# Patient Record
Sex: FEMALE | Race: WHITE | NOT HISPANIC OR LATINO | Employment: OTHER | URBAN - METROPOLITAN AREA
[De-identification: names, ages, dates, MRNs, and addresses within clinical notes are randomized per-mention and may not be internally consistent; named-entity substitution may affect disease eponyms.]

---

## 2017-04-03 ENCOUNTER — HOSPITAL ENCOUNTER (OUTPATIENT)
Dept: MAMMOGRAPHY | Facility: CLINIC | Age: 67
Discharge: HOME | End: 2017-04-03
Attending: FAMILY MEDICINE | Admitting: FAMILY MEDICINE
Payer: MEDICARE

## 2017-04-03 DIAGNOSIS — Z12.31 VISIT FOR SCREENING MAMMOGRAM: ICD-10-CM

## 2017-04-03 PROCEDURE — 77063 BREAST TOMOSYNTHESIS BI: CPT

## 2017-04-03 PROCEDURE — G0202 SCR MAMMO BI INCL CAD: HCPCS

## 2017-04-03 NOTE — PROGRESS NOTES
Dear Francisca,    Your recent mammogram was normal. Annual mammograms are recommended, so you will be due again in April 2018.  You may receive a separate result letter in the mail from the imaging center.    Please contact the clinic if you have additional questions.  Thank you.    Sincerely,    Ju Mckeon PA-C  Physician extender for Dr. Karen Weiler

## 2017-05-01 ENCOUNTER — TELEPHONE (OUTPATIENT)
Dept: FAMILY MEDICINE | Facility: CLINIC | Age: 67
End: 2017-05-01

## 2017-05-01 DIAGNOSIS — R19.7 DIARRHEA, UNSPECIFIED TYPE: Primary | ICD-10-CM

## 2017-05-01 NOTE — TELEPHONE ENCOUNTER
Reason for call:  Patient reporting a symptom    Symptom or request: diarrhea    Duration (how long have symptoms been present): has been going on for about 8-9 months, but is now worse    Have you been treated for this before? Yes    Additional comments: Pt was seen for this and was told it may be IBS, but now she feels this is different.  For the past few days she has had very watery diarrhea and has had some accidents.  Did vomit one time early Friday morning, but nothing since then.  Just the constant diarrhea.  She was offered an appt, but she does not feel she can make it in due to the severity of her sxs.  Asking for a call back to triage.    Phone Number patient can be reached at:  Home number on file [phone] (home)    Best Time:      Can we leave a detailed message on this number:  YES    Call taken on 5/1/2017 at 8:09 AM by Ivania Krishna

## 2017-05-01 NOTE — TELEPHONE ENCOUNTER
Please call patient and let her know we need to see her. I would also like to collect stool studies. I placed the orders and she can come in or have someone come in for the containers.     Karen Weiler, MD

## 2017-05-01 NOTE — TELEPHONE ENCOUNTER
Please see telephone encounter below. I spoke with patient:    Diarrhea     Onset: off and on for 9 months, worsening over the last few days    Description:   Consistency of stool: watery and yellow  Blood in stool: no   Number of loose stools in past 24 hours: 6    Progression of Symptoms:  same and intermittent    Accompanying Signs & Symptoms:  Fever: no, however has felt chills  Nausea or vomiting; YES- vomited early Friday morning - nothing since that time  Abdominal pain: YES- every once in a while will have some cramping  Episodes of constipation: no   Weight loss: no    History:   Ill contacts: no   Recent use of antibiotics: no    Recent travels: no          Recent medication-new or changes(Rx or OTC): no     Precipitating factors:   See below    Alleviating factors:   None       Therapies Tried and outcome:  Imodium AD and Culturelle; Outcome: just started it today, but has still been having diarrhea    She has not had much of an appetite but is still trying to eat. She has been pushing fluids - however, eating and drinking tend to trigger her diarrhea episodes.     Advised patient that she may continue with Imodium given that she has only been on it for one day. Stick with a bland diet and continue to push fluids.    Please advise if you would like to see patient and/or stool studies. Thank you.  Radha Uriarte, RN, BSN  [facility] [place]

## 2017-05-01 NOTE — TELEPHONE ENCOUNTER
Spoke with patient. Appointment scheduled with TERRANCE JIMENEZ for tomorrow (5/2/17) at 1:40pm.    Patient verbalized understanding and agrees with plan.    Will call back if further questions or concerns.    Radha Uriarte RN, BSN

## 2017-05-02 ENCOUNTER — OFFICE VISIT (OUTPATIENT)
Dept: FAMILY MEDICINE | Facility: CLINIC | Age: 67
End: 2017-05-02
Payer: COMMERCIAL

## 2017-05-02 VITALS
DIASTOLIC BLOOD PRESSURE: 78 MMHG | HEART RATE: 119 BPM | OXYGEN SATURATION: 96 % | TEMPERATURE: 98.3 F | SYSTOLIC BLOOD PRESSURE: 122 MMHG | HEIGHT: 69 IN | WEIGHT: 232 LBS | BODY MASS INDEX: 34.36 KG/M2

## 2017-05-02 DIAGNOSIS — F43.0 ACUTE REACTION TO STRESS: ICD-10-CM

## 2017-05-02 DIAGNOSIS — R19.7 DIARRHEA, UNSPECIFIED TYPE: Primary | ICD-10-CM

## 2017-05-02 DIAGNOSIS — F50.89 PICA: ICD-10-CM

## 2017-05-02 LAB
BASOPHILS # BLD AUTO: 0 10E9/L (ref 0–0.2)
BASOPHILS NFR BLD AUTO: 0.8 %
DIFFERENTIAL METHOD BLD: NORMAL
EOSINOPHIL # BLD AUTO: 0.1 10E9/L (ref 0–0.7)
EOSINOPHIL NFR BLD AUTO: 2 %
ERYTHROCYTE [DISTWIDTH] IN BLOOD BY AUTOMATED COUNT: 12.6 % (ref 10–15)
HCT VFR BLD AUTO: 46.1 % (ref 35–47)
HGB BLD-MCNC: 15.6 G/DL (ref 11.7–15.7)
LYMPHOCYTES # BLD AUTO: 1.5 10E9/L (ref 0.8–5.3)
LYMPHOCYTES NFR BLD AUTO: 29.5 %
MCH RBC QN AUTO: 31.8 PG (ref 26.5–33)
MCHC RBC AUTO-ENTMCNC: 33.8 G/DL (ref 31.5–36.5)
MCV RBC AUTO: 94 FL (ref 78–100)
MONOCYTES # BLD AUTO: 0.6 10E9/L (ref 0–1.3)
MONOCYTES NFR BLD AUTO: 12.3 %
NEUTROPHILS # BLD AUTO: 2.8 10E9/L (ref 1.6–8.3)
NEUTROPHILS NFR BLD AUTO: 55.4 %
PLATELET # BLD AUTO: 196 10E9/L (ref 150–450)
RBC # BLD AUTO: 4.91 10E12/L (ref 3.8–5.2)
WBC # BLD AUTO: 5.1 10E9/L (ref 4–11)

## 2017-05-02 PROCEDURE — 80053 COMPREHEN METABOLIC PANEL: CPT | Performed by: PHYSICIAN ASSISTANT

## 2017-05-02 PROCEDURE — 99213 OFFICE O/P EST LOW 20 MIN: CPT | Performed by: PHYSICIAN ASSISTANT

## 2017-05-02 PROCEDURE — 85025 COMPLETE CBC W/AUTO DIFF WBC: CPT | Performed by: PHYSICIAN ASSISTANT

## 2017-05-02 PROCEDURE — 36415 COLL VENOUS BLD VENIPUNCTURE: CPT | Performed by: PHYSICIAN ASSISTANT

## 2017-05-02 ASSESSMENT — ANXIETY QUESTIONNAIRES
2. NOT BEING ABLE TO STOP OR CONTROL WORRYING: NOT AT ALL
5. BEING SO RESTLESS THAT IT IS HARD TO SIT STILL: NOT AT ALL
1. FEELING NERVOUS, ANXIOUS, OR ON EDGE: SEVERAL DAYS
IF YOU CHECKED OFF ANY PROBLEMS ON THIS QUESTIONNAIRE, HOW DIFFICULT HAVE THESE PROBLEMS MADE IT FOR YOU TO DO YOUR WORK, TAKE CARE OF THINGS AT HOME, OR GET ALONG WITH OTHER PEOPLE: NOT DIFFICULT AT ALL
GAD7 TOTAL SCORE: 2
3. WORRYING TOO MUCH ABOUT DIFFERENT THINGS: SEVERAL DAYS
7. FEELING AFRAID AS IF SOMETHING AWFUL MIGHT HAPPEN: NOT AT ALL
6. BECOMING EASILY ANNOYED OR IRRITABLE: NOT AT ALL

## 2017-05-02 ASSESSMENT — PATIENT HEALTH QUESTIONNAIRE - PHQ9: 5. POOR APPETITE OR OVEREATING: NOT AT ALL

## 2017-05-02 NOTE — PROGRESS NOTES
SUBJECTIVE:                                                    Francisca Ramírez is a 66 year old female who presents to clinic today for the following health issues:    Diarrhea     Onset: off and on for 9 months, worsening over the last few days    Description:   Consistency of stool: watery and yellow  Blood in stool: no   Number of loose stools in past 24 hours: 6    Progression of Symptoms: same and intermittent    Accompanying Signs & Symptoms:  Fever: no, however has felt chills  Nausea or vomiting; YES- vomited early Friday morning - nothing since that time  Abdominal pain: YES- every once in a while will have some cramping  Episodes of constipation: no   Weight loss: no   Fatigue:  Very much so    History:   Ill contacts: no   Recent use of antibiotics: no    Recent travels: no    Recent medication-new or changes(Rx or OTC): no     Precipitating factors:   See below    Alleviating factors:   None     Therapies Tried and outcome:  Imodium AD and Culturelle; Outcome: just started it today, but has still been having diarrhea. Has now stopped Culturelle    She has not had much of an appetite but is still trying to eat. She has been pushing fluids - however, eating and drinking tend to trigger her diarrhea episodes.    Patient is craving ice and cold water     Nausea on Friday AM, then resolved.  Had some vomiting and diarrhea. Vomited once.  Dairy has caused GI symptoms for her in the past.  Granddaughter sick recently, but unsure if she had GI symptoms. Knows she had a cough.    Reports slight sore throat today.    No internat'l travel.    Chills, but no fevers    No abdominal pain.  Hearing some stomach sounds.    Has had fecal incontinence.    Normal colonoscopy in 2014, other than internal hemorrhoids. Due for screening colonoscopy in 2019.    Had negative celiac testing in Nov 2016.    Some abnormal sensations in the back of her thighs, but wonders if this is from being inactive recently. Denies back  "pain or other body pains.  No urinary symptoms. No shortness of breath.    Problem list and histories reviewed & adjusted, as indicated.  Additional history: as documented    Patient Active Problem List   Diagnosis     Acute reaction to stress     HYPERLIPIDEMIA LDL GOAL <130     Advanced directives, counseling/discussion     Impaired fasting glucose     Knee pain     Family history of colon cancer     Past Surgical History:   Procedure Laterality Date     cataracts       COLONOSCOPY N/A 2014    Procedure: COLONOSCOPY;  Surgeon: Todd Borrego MD;  Location:  GI     EYE SURGERY      Cataract surgery on both eyes     GYN SURGERY  ?    d/c     SURGICAL HISTORY OF -       s/p D&C 25 years ago     SURGICAL HISTORY OF -   2004    Colonoscopy       Social History   Substance Use Topics     Smoking status: Never Smoker     Smokeless tobacco: Never Used     Alcohol use Yes      Comment: two or more on occassion     Family History   Problem Relation Age of Onset     Cancer - colorectal Mother      82 yo      Colon Cancer Mother           CANCER Father      Brain tumor, age 46         Current Outpatient Prescriptions   Medication Sig Dispense Refill     simvastatin (ZOCOR) 40 MG tablet Take 1 tablet (40 mg) by mouth daily 90 tablet 3     venlafaxine (EFFEXOR-XR) 75 MG 24 hr capsule Take 1 capsule (75 mg) by mouth daily 90 capsule 3     cholecalciferol (VITAMIN-D) 1000 UNIT capsule Take 1 capsule by mouth daily.       UNKNOWN MED DOSAGE Flexitol heel balm: apply to feet daily as needed 1 1     Allergies   Allergen Reactions     Vicodin [Hydrocodone-Acetaminophen]        ROS:  Constitutional, HEENT, cardiovascular, pulmonary, gi and gu systems are negative, except as otherwise noted.    OBJECTIVE:                                                    /78 (BP Location: Right arm, Patient Position: Chair, Cuff Size: Adult Large)  Pulse 119  Temp 98.3  F (36.8  C) (Oral)  Ht 5' 9\" (1.753 m) "  Wt 232 lb (105.2 kg)  SpO2 96%  BMI 34.26 kg/m2  Body mass index is 34.26 kg/(m^2).  GENERAL: healthy, alert and no distress  RESP: lungs clear to auscultation - no rales, rhonchi or wheezes  CV: regular rate and rhythm, normal S1 S2, no S3 or S4, no murmur, click or rub, no peripheral edema and peripheral pulses strong  ABDOMEN: bowel sounds normal and mild suprapubic tenderness  MS: Trace pedal edema  PSYCH: mentation appears normal, affect normal/bright    Diagnostic Test Results:  No results found for this or any previous visit (from the past 24 hour(s)).     ASSESSMENT/PLAN:                                                      1. Diarrhea, unspecified type  Will check stool studies. Colitis and IBS are also on differential. Continue with Imodium. Keep diet bland. Push fluids.     2. Pica  Check labs today. Normal hemoglobin at last physical. Symptoms may be related to dehydration.  - CBC with platelets and differential  - Comprehensive metabolic panel (BMP + Alb, Alk Phos, ALT, AST, Total. Bili, TP)  - JUST IN CASE    3. Acute reaction to stress  Recommend restarting Effexor. Stopped it for the past few days. Last dose increase was in Nov 2015, so low suspicion of these symptoms being due to Effexor.    See Patient Instructions    Ju Mckeon PA-C  [facility]

## 2017-05-02 NOTE — NURSING NOTE
"Chief Complaint   Patient presents with     Diarrhea     on and off x 9 months but worsening last 2-3 days      Initial /78 (BP Location: Right arm, Patient Position: Chair, Cuff Size: Adult Large)  Pulse 119  Temp 98.3  F (36.8  C) (Oral)  Ht 5' 9\" (1.753 m)  Wt 232 lb (105.2 kg)  SpO2 96%  BMI 34.26 kg/m2 Estimated body mass index is 34.26 kg/(m^2) as calculated from the following:    Height as of this encounter: 5' 9\" (1.753 m).    Weight as of this encounter: 232 lb (105.2 kg).  BP completed using cuff size large right Arm  Naomi Lorasruthi CMA    "

## 2017-05-02 NOTE — PATIENT INSTRUCTIONS
-Continue on Imodium as needed  -Continue with water, and consider adding some Pedialyte to replace electrolytes  -Keep diet bland

## 2017-05-02 NOTE — MR AVS SNAPSHOT
After Visit Summary   5/2/2017    Francisca Ramírez    MRN: [id number]           Patient Information     Date Of Birth          1950        Visit Information        Provider Department      5/2/2017 1:40 PM Ju Mckeon PA-C [facility] [place]        Today's Diagnoses     Diarrhea, unspecified type    -  1    Pica          Care Instructions    -Continue on Imodium as needed  -Continue with water, and consider adding some Pedialyte to replace electrolytes  -Keep diet bland        Follow-ups after your visit        Future tests that were ordered for you today     Open Future Orders        Priority Expected Expires Ordered    Enteric Bacteria and Virus Panel by [facility] Stool Routine  5/1/2018 5/1/2017    Clostridium difficile Toxin B PCR Routine  11/1/2017 5/1/2017            Who to contact     If you have questions or need follow up information about today's clinic visit or your schedule please contact [facility] directly at [phone].  Normal or non-critical lab and imaging results will be communicated to you by [employer], letter or phone within 4 business days after the clinic has received the results. If you do not hear from us within 7 days, please contact the clinic through [employer] or phone. If you have a critical or abnormal lab result, we will notify you by phone as soon as possible.  Submit refill requests through [employer] or call your pharmacy and they will forward the refill request to us. Please allow 3 business days for your refill to be completed.          Additional Information About Your Visit        MyChart Information     [employer] gives you secure access to your electronic health record. If you see a primary care provider, you can also send messages to your care team and make appointments. If you have questions, please call your primary care clinic.  If you do not have a primary care provider, please call [phone] and they will assist you.        Care  "EveryWhere ID     This is your Care EveryWhere ID. This could be used by other organizations to access your [facility] medical records  [id number]        Your Vitals Were     Pulse Temperature Height Pulse Oximetry BMI (Body Mass Index)       119 98.3  F (36.8  C) (Oral) 5' 9\" (1.753 m) 96% 34.26 kg/m2        Blood Pressure from Last 3 Encounters:   05/02/17 122/78   11/21/16 122/78   11/27/15 112/70    Weight from Last 3 Encounters:   05/02/17 232 lb (105.2 kg)   11/21/16 240 lb (108.9 kg)   11/27/15 246 lb (111.6 kg)              We Performed the Following     CBC with platelets and differential     Comprehensive metabolic panel (BMP + Alb, Alk Phos, ALT, AST, Total. Bili, TP)     JUST IN CASE        Primary Care Provider Office Phone # Fax #    Karen Weiler, [phone]       [facility] [address] [address]        Thank you!     Thank you for choosing [facility]  for your care. Our goal is always to provide you with excellent care. Hearing back from our patients is one way we can continue to improve our services. Please take a few minutes to complete the written survey that you may receive in the mail after your visit with us. Thank you!             Your Updated Medication List - Protect others around you: Learn how to safely use, store and throw away your medicines at www.disposemymeds.org.          This list is accurate as of: 5/2/17  2:01 PM.  Always use your most recent med list.                   Brand Name Dispense Instructions for use    cholecalciferol 1000 UNITS capsule    vitamin  -D     Take 1 capsule by mouth daily.       simvastatin 40 MG tablet    ZOCOR    90 tablet    Take 1 tablet (40 mg) by mouth daily       UNKNOWN MED DOSAGE     1    Flexitol heel balm: apply to feet daily as needed       venlafaxine 75 MG 24 hr capsule    EFFEXOR-XR    90 capsule    Take 1 capsule (75 mg) by mouth daily         "

## 2017-05-03 LAB
ALBUMIN SERPL-MCNC: 3.8 G/DL (ref 3.4–5)
ALP SERPL-CCNC: 77 U/L (ref 40–150)
ALT SERPL W P-5'-P-CCNC: 44 U/L (ref 0–50)
ANION GAP SERPL CALCULATED.3IONS-SCNC: 9 MMOL/L (ref 3–14)
AST SERPL W P-5'-P-CCNC: 38 U/L (ref 0–45)
BILIRUB SERPL-MCNC: 0.6 MG/DL (ref 0.2–1.3)
BUN SERPL-MCNC: 12 MG/DL (ref 7–30)
CALCIUM SERPL-MCNC: 8.8 MG/DL (ref 8.5–10.1)
CHLORIDE SERPL-SCNC: 109 MMOL/L (ref 94–109)
CO2 SERPL-SCNC: 26 MMOL/L (ref 20–32)
CREAT SERPL-MCNC: 0.73 MG/DL (ref 0.52–1.04)
GFR SERPL CREATININE-BSD FRML MDRD: 80 ML/MIN/1.7M2
GLUCOSE SERPL-MCNC: 96 MG/DL (ref 70–99)
POTASSIUM SERPL-SCNC: 3.7 MMOL/L (ref 3.4–5.3)
PROT SERPL-MCNC: 7.6 G/DL (ref 6.8–8.8)
SODIUM SERPL-SCNC: 144 MMOL/L (ref 133–144)

## 2017-05-03 ASSESSMENT — ANXIETY QUESTIONNAIRES: GAD7 TOTAL SCORE: 2

## 2017-05-03 ASSESSMENT — PATIENT HEALTH QUESTIONNAIRE - PHQ9: SUM OF ALL RESPONSES TO PHQ QUESTIONS 1-9: 3

## 2017-05-04 ENCOUNTER — TELEPHONE (OUTPATIENT)
Dept: FAMILY MEDICINE | Facility: CLINIC | Age: 67
End: 2017-05-04

## 2017-05-04 NOTE — TELEPHONE ENCOUNTER
Message handled by Nurse Triage with [employer] - provider name: Ju Mckeon PA-C. Blood work was normal.     Patient reports that she has now stopped having diarrhea and is wondering about still leaving stool samples. She stopped Imodium yesterday when symptoms had resolved. She is feeling much more like herself and has been able to tolerate more regular foods without issue.    I advised patient that we can hold off on collecting stool samples for now. If symptoms should return, she may go ahead and collect them.    Patient verbalized understanding and agrees with plan.    Will call back if further questions or concerns.    Radha Uriarte, RN, BSN

## 2017-05-08 NOTE — PROGRESS NOTES
Henry Espinosa,    -Liver and gallbladder tests are normal. (ALT,AST, Alk phos, bilirubin), kidney function is normal (Cr, GFR), Sodium is normal, Potassium is normal, Calcium is normal, Glucose is normal (diabetes screening test).   -Normal red blood cell (hgb) levels, normal white blood cell count and normal platelet levels.    If you have further questions about the interpretation of your labs, labtestsonline.org is a good website to check out for further information.    Please contact the clinic if you have additional questions.  Thank you.    Sincerely,    Ju Mckeon PA-C

## 2017-05-17 ENCOUNTER — OFFICE VISIT (OUTPATIENT)
Dept: FAMILY MEDICINE | Facility: CLINIC | Age: 67
End: 2017-05-17
Payer: COMMERCIAL

## 2017-05-17 VITALS
BODY MASS INDEX: 34.66 KG/M2 | OXYGEN SATURATION: 95 % | DIASTOLIC BLOOD PRESSURE: 64 MMHG | WEIGHT: 234 LBS | SYSTOLIC BLOOD PRESSURE: 112 MMHG | HEART RATE: 100 BPM | HEIGHT: 69 IN | TEMPERATURE: 98.5 F

## 2017-05-17 DIAGNOSIS — J01.01 ACUTE RECURRENT MAXILLARY SINUSITIS: ICD-10-CM

## 2017-05-17 DIAGNOSIS — R19.5 LOOSE STOOLS: Primary | ICD-10-CM

## 2017-05-17 PROCEDURE — 99213 OFFICE O/P EST LOW 20 MIN: CPT | Performed by: FAMILY MEDICINE

## 2017-05-17 RX ORDER — LOPERAMIDE HYDROCHLORIDE 2 MG/1
TABLET ORAL
Qty: 30 TABLET | Refills: 0 | Status: SHIPPED | OUTPATIENT
Start: 2017-05-17 | End: 2018-04-30

## 2017-05-17 RX ORDER — AZITHROMYCIN 250 MG/1
TABLET, FILM COATED ORAL
Qty: 6 TABLET | Refills: 0 | Status: SHIPPED | OUTPATIENT
Start: 2017-05-17 | End: 2017-07-05

## 2017-05-17 NOTE — PROGRESS NOTES
"  SUBJECTIVE:                                                    Francisca Ramírez is a 66 year old female who presents to clinic today for the following health issues:      Acute Illness   Acute illness concerns: Sinus Problem   Onset: x1 weeks     Fever: YES- maybe     Chills/Sweats: YES    Headache (location?): YES middle of night , sometimes     Sinus Pressure:YES    Conjunctivitis:  Morning time only     Ear Pain: YES: right    Rhinorrhea: YES     Congestion: YES    Sore Throat: YES     Cough: YES-productive of green sputum    Wheeze: no    Decreased Appetite: no    Nausea: no    Vomiting: no    Diarrhea:  no    Dysuria/Freq.: no    Fatigue/Achiness: YES    Sick/Strep Exposure: maybe granddaughter had runny nose      Therapies Tried and outcome: tylenol and HI-C for coughing     Had diarrhea about 2 weeks ago.  Felt better. Could not bring in stool sample.      Problem list and histories reviewed & adjusted, as indicated.  Additional history: as documented      Reviewed and updated as needed this visit by clinical staff       Reviewed and updated as needed this visit by Provider         ROS:  Constitutional, HEENT, cardiovascular, pulmonary, gi and gu systems are negative, except as otherwise noted.    OBJECTIVE:                                                    /64  Pulse 100  Temp 98.5  F (36.9  C) (Oral)  Ht 5' 9\" (1.753 m)  Wt 234 lb (106.1 kg)  SpO2 95%  BMI 34.56 kg/m2  Body mass index is 34.56 kg/(m^2).  GENERAL: healthy, alert and no distress  EYES: Eyes grossly normal to inspection, PERRL and conjunctivae and sclerae normal  HENT: ear canals and TM's normal, nose and mouth without ulcers or lesions.  +maxillary sinus tenderness  NECK: no adenopathy, no asymmetry, masses, or scars and thyroid normal to palpation  RESP: lungs clear to auscultation - no rales, rhonchi or wheezes  CV: regular rate and rhythm, normal S1 S2, no S3 or S4, no murmur, click or rub, no peripheral edema and " peripheral pulses strong  ABDOMEN: soft, nontender, no hepatosplenomegaly, no masses and bowel sounds normal  MS: no gross musculoskeletal defects noted, no edema         ASSESSMENT/PLAN:                                                        ICD-10-CM    1. Loose stools R19.5 loperamide (IMODIUM A-D) 2 MG tablet   2. Acute recurrent maxillary sinusitis J01.01 azithromycin (ZITHROMAX) 250 MG tablet       Follow up if not improving.       Karen Weiler, MD  [facility]

## 2017-05-17 NOTE — MR AVS SNAPSHOT
"              After Visit Summary   5/17/2017    Francisca Ramírez    MRN: [id number]           Patient Information     Date Of Birth          1950        Visit Information        Provider Department      5/17/2017 11:00 AM Weiler, Karen, MD [facility] [place]        Today's Diagnoses     Loose stools    -  1    Acute recurrent maxillary sinusitis           Follow-ups after your visit        Who to contact     If you have questions or need follow up information about today's clinic visit or your schedule please contact [facility] directly at [phone].  Normal or non-critical lab and imaging results will be communicated to you by [employer], letter or phone within 4 business days after the clinic has received the results. If you do not hear from us within 7 days, please contact the clinic through [employer] or phone. If you have a critical or abnormal lab result, we will notify you by phone as soon as possible.  Submit refill requests through [employer] or call your pharmacy and they will forward the refill request to us. Please allow 3 business days for your refill to be completed.          Additional Information About Your Visit        MyChart Information     [employer] gives you secure access to your electronic health record. If you see a primary care provider, you can also send messages to your care team and make appointments. If you have questions, please call your primary care clinic.  If you do not have a primary care provider, please call [phone] and they will assist you.        Care EveryWhere ID     This is your Care EveryWhere ID. This could be used by other organizations to access your [facility] medical records  [id number]        Your Vitals Were     Pulse Temperature Height Pulse Oximetry BMI (Body Mass Index)       100 98.5  F (36.9  C) (Oral) 5' 9\" (1.753 m) 95% 34.56 kg/m2        Blood Pressure from Last 3 Encounters:   05/17/17 112/64   05/02/17 122/78   11/21/16 122/78    " Weight from Last 3 Encounters:   05/17/17 234 lb (106.1 kg)   05/02/17 232 lb (105.2 kg)   11/21/16 240 lb (108.9 kg)              Today, you had the following     No orders found for display         Today's Medication Changes          These changes are accurate as of: 5/17/17 11:59 PM.  If you have any questions, ask your nurse or doctor.               Start taking these medicines.        Dose/Directions    azithromycin 250 MG tablet   Commonly known as:  ZITHROMAX   Used for:  Acute recurrent maxillary sinusitis   Started by:  Weiler, Karen, MD        Two tablets first day, then one tablet daily for four days.   Quantity:  6 tablet   Refills:  0       loperamide 2 MG tablet   Commonly known as:  IMODIUM A-D   Used for:  Loose stools   Started by:  Weiler, Karen, MD        Take 2 tabs (4 mg) after first loose stool, and then take one tab (2 mg) after each diarrheal stool.  Max of 8 tabs (16 mg) per day.   Quantity:  30 tablet   Refills:  0            Where to get your medicines      These medications were sent to [id number]  [address] - [address]   [address] [address]     Phone:  [phone]     azithromycin 250 MG tablet    loperamide 2 MG tablet                Primary Care Provider Office Phone # Fax #    Karen Weiler, [phone]       [facility] [address] [address]        Thank you!     Thank you for choosing [facility]  for your care. Our goal is always to provide you with excellent care. Hearing back from our patients is one way we can continue to improve our services. Please take a few minutes to complete the written survey that you may receive in the mail after your visit with us. Thank you!             Your Updated Medication List - Protect others around you: Learn how to safely use, store and throw away your medicines at www.disposemymeds.org.          This list is accurate as  of: 5/17/17 11:59 PM.  Always use your most recent med list.                   Brand Name Dispense Instructions for use    azithromycin 250 MG tablet    ZITHROMAX    6 tablet    Two tablets first day, then one tablet daily for four days.       cholecalciferol 1000 UNITS capsule    vitamin  -D     Take 1 capsule by mouth daily.       loperamide 2 MG tablet    IMODIUM A-D    30 tablet    Take 2 tabs (4 mg) after first loose stool, and then take one tab (2 mg) after each diarrheal stool.  Max of 8 tabs (16 mg) per day.       simvastatin 40 MG tablet    ZOCOR    90 tablet    Take 1 tablet (40 mg) by mouth daily       UNKNOWN MED DOSAGE     1    Flexitol heel balm: apply to feet daily as needed       venlafaxine 75 MG 24 hr capsule    EFFEXOR-XR    90 capsule    Take 1 capsule (75 mg) by mouth daily

## 2017-05-17 NOTE — NURSING NOTE
"Chief Complaint   Patient presents with     Sinus Problem       Initial /64  Pulse 100  Temp 98.5  F (36.9  C) (Oral)  Ht 5' 9\" (1.753 m)  Wt 234 lb (106.1 kg)  SpO2 95%  BMI 34.56 kg/m2 Estimated body mass index is 34.56 kg/(m^2) as calculated from the following:    Height as of this encounter: 5' 9\" (1.753 m).    Weight as of this encounter: 234 lb (106.1 kg).  Medication Reconciliation: complete   Conchis Balbuena Certified Medical Assistant      "

## 2017-07-05 ENCOUNTER — OFFICE VISIT (OUTPATIENT)
Dept: FAMILY MEDICINE | Facility: CLINIC | Age: 67
End: 2017-07-05
Payer: COMMERCIAL

## 2017-07-05 VITALS
HEART RATE: 68 BPM | SYSTOLIC BLOOD PRESSURE: 117 MMHG | OXYGEN SATURATION: 95 % | DIASTOLIC BLOOD PRESSURE: 77 MMHG | TEMPERATURE: 98 F | BODY MASS INDEX: 33.92 KG/M2 | RESPIRATION RATE: 16 BRPM | HEIGHT: 69 IN | WEIGHT: 229 LBS

## 2017-07-05 DIAGNOSIS — F43.0 ACUTE REACTION TO STRESS: ICD-10-CM

## 2017-07-05 DIAGNOSIS — K52.9 CHRONIC DIARRHEA: Primary | ICD-10-CM

## 2017-07-05 LAB
C DIFF TOX B STL QL: NORMAL
CAMPYLOBACTER GROUP BY NAT: NOT DETECTED
ENTERIC PATHOGEN COMMENT: NORMAL
NOROVIRUS I AND II BY NAT: NOT DETECTED
ROTAVIRUS A BY NAT: NOT DETECTED
SALMONELLA SPECIES BY NAT: NOT DETECTED
SHIGA TOXIN 1 GENE BY NAT: NOT DETECTED
SHIGA TOXIN 2 GENE BY NAT: NOT DETECTED
SHIGELLA SP+EIEC IPAH STL QL NAA+PROBE: NOT DETECTED
SPECIMEN SOURCE: NORMAL
VIBRIO GROUP BY NAT: NOT DETECTED
YERSINIA ENTEROCOLITICA BY NAT: NOT DETECTED

## 2017-07-05 PROCEDURE — 87506 IADNA-DNA/RNA PROBE TQ 6-11: CPT | Performed by: PHYSICIAN ASSISTANT

## 2017-07-05 PROCEDURE — 87493 C DIFF AMPLIFIED PROBE: CPT | Performed by: PHYSICIAN ASSISTANT

## 2017-07-05 PROCEDURE — 99214 OFFICE O/P EST MOD 30 MIN: CPT | Performed by: PHYSICIAN ASSISTANT

## 2017-07-05 RX ORDER — VENLAFAXINE HYDROCHLORIDE 37.5 MG/1
37.5 CAPSULE, EXTENDED RELEASE ORAL DAILY
Qty: 90 CAPSULE | Refills: 0 | Status: SHIPPED | OUTPATIENT
Start: 2017-07-05 | End: 2017-10-24

## 2017-07-05 NOTE — MR AVS SNAPSHOT
After Visit Summary   7/5/2017    Francisca Ramírez    MRN: [id number]           Patient Information     Date Of Birth          1950        Visit Information        Provider Department      7/5/2017 8:40 AM Antoni Oneill PA-C [facility] [place]        Today's Diagnoses     Chronic diarrhea    -  1    Acute reaction to stress           Follow-ups after your visit        Future tests that were ordered for you today     Open Future Orders        Priority Expected Expires Ordered    Clostridium difficile Toxin B PCR Routine  8/4/2017 7/5/2017    Enteric Bacteria and Virus Panel by SOHAIL Stool Routine  7/5/2018 7/5/2017            Who to contact     If you have questions or need follow up information about today's clinic visit or your schedule please contact [facility] [place] directly at [phone].  Normal or non-critical lab and imaging results will be communicated to you by [employer], letter or phone within 4 business days after the clinic has received the results. If you do not hear from us within 7 days, please contact the clinic through [employer] or phone. If you have a critical or abnormal lab result, we will notify you by phone as soon as possible.  Submit refill requests through [employer] or call your pharmacy and they will forward the refill request to us. Please allow 3 business days for your refill to be completed.          Additional Information About Your Visit        MyChart Information     [employer] gives you secure access to your electronic health record. If you see a primary care provider, you can also send messages to your care team and make appointments. If you have questions, please call your primary care clinic.  If you do not have a primary care provider, please call [phone] and they will assist you.        Care EveryWhere ID     This is your Care EveryWhere ID. This could be used by other organizations to access your [facility]  "records  [id number]        Your Vitals Were     Pulse Temperature Respirations Height Pulse Oximetry BMI (Body Mass Index)    68 98  F (36.7  C) (Tympanic) 16 5' 9\" (1.753 m) 95% 33.82 kg/m2       Blood Pressure from Last 3 Encounters:   07/05/17 117/77   05/17/17 112/64   05/02/17 122/78    Weight from Last 3 Encounters:   07/05/17 229 lb (103.9 kg)   05/17/17 234 lb (106.1 kg)   05/02/17 232 lb (105.2 kg)                 Today's Medication Changes          These changes are accurate as of: 7/5/17  9:38 AM.  If you have any questions, ask your nurse or doctor.               These medicines have changed or have updated prescriptions.        Dose/Directions    * venlafaxine 75 MG 24 hr capsule   Commonly known as:  EFFEXOR-XR   This may have changed:  Another medication with the same name was added. Make sure you understand how and when to take each.   Used for:  Acute reaction to stress   Changed by:  Weiler, Karen, MD        Dose:  75 mg   Take 1 capsule (75 mg) by mouth daily   Quantity:  90 capsule   Refills:  3       * venlafaxine 37.5 MG 24 hr capsule   Commonly known as:  EFFEXOR-XR   This may have changed:  You were already taking a medication with the same name, and this prescription was added. Make sure you understand how and when to take each.   Used for:  Acute reaction to stress   Changed by:  Antoni Oneill PA-C        Dose:  37.5 mg   Take 1 capsule (37.5 mg) by mouth daily   Quantity:  90 capsule   Refills:  0       * Notice:  This list has 2 medication(s) that are the same as other medications prescribed for you. Read the directions carefully, and ask your doctor or other care provider to review them with you.         Where to get your medicines      These medications were sent to [facility] [address]   [address] [address]     Phone:  [phone]     venlafaxine 37.5 MG 24 hr capsule                Primary " Care Provider Office Phone # Fax #    Antoni Oneill PA-C [phone]       [facility] [place] [address]  [place] [address]        Equal Access to Services     MEKA MORRIS : Hadii aad ku hadmartiro Soomaali, waaxda luqadaha, qaybta kaalmada adeegyada, waxay idiin hayaan ademisbah khhugosh lajennifer paredes. So [facility] [phone].    ATENCIÓN: Si habla español, tiene a edmond disposición servicios gratuitos de asistencia lingüística. Llame al [phone].    We comply with applicable federal civil rights laws and [place] laws. We do not discriminate on the basis of race, color, national origin, age, disability sex, sexual orientation or gender identity.            Thank you!     Thank you for choosing [facility] [place]  for your care. Our goal is always to provide you with excellent care. Hearing back from our patients is one way we can continue to improve our services. Please take a few minutes to complete the written survey that you may receive in the mail after your visit with us. Thank you!             Your Updated Medication List - Protect others around you: Learn how to safely use, store and throw away your medicines at www.disposemymeds.org.          This list is accurate as of: 7/5/17  9:38 AM.  Always use your most recent med list.                   Brand Name Dispense Instructions for use Diagnosis    cholecalciferol 1000 UNITS capsule    vitamin  -D     Take 1 capsule by mouth daily.        loperamide 2 MG tablet    IMODIUM A-D    30 tablet    Take 2 tabs (4 mg) after first loose stool, and then take one tab (2 mg) after each diarrheal stool.  Max of 8 tabs (16 mg) per day.    Loose stools       simvastatin 40 MG tablet    ZOCOR    90 tablet    Take 1 tablet (40 mg) by mouth daily    Hyperlipidemia LDL goal <130       UNKNOWN MED DOSAGE     1    Flexitol heel balm: apply to feet daily as needed    Dermatophytosis of foot       * venlafaxine 75 MG 24 hr capsule     EFFEXOR-XR    90 capsule    Take 1 capsule (75 mg) by mouth daily    Acute reaction to stress       * venlafaxine 37.5 MG 24 hr capsule    EFFEXOR-XR    90 capsule    Take 1 capsule (37.5 mg) by mouth daily    Acute reaction to stress       * Notice:  This list has 2 medication(s) that are the same as other medications prescribed for you. Read the directions carefully, and ask your doctor or other care provider to review them with you.

## 2017-07-05 NOTE — NURSING NOTE
"Chief Complaint   Patient presents with     Diarrhea       Initial /77 (BP Location: Left arm, Patient Position: Chair, Cuff Size: Adult Regular)  Pulse 68  Temp 98  F (36.7  C) (Tympanic)  Resp 16  Ht 5' 9\" (1.753 m)  Wt 229 lb (103.9 kg)  SpO2 95%  BMI 33.82 kg/m2 Estimated body mass index is 33.82 kg/(m^2) as calculated from the following:    Height as of this encounter: 5' 9\" (1.753 m).    Weight as of this encounter: 229 lb (103.9 kg).  Medication Reconciliation: complete    Ivania Pardo MA  "

## 2017-07-05 NOTE — PROGRESS NOTES
SUBJECTIVE:                                                    Francisca Ramírez is a 66 year old female who presents to clinic today for the following health issues:      Diarrhea  Onset:  1 year  Description: Pt has no control of it, diarrhea has been off and on. Pt is not sure if it is related to anxiety or medication Effexor.   Consistency of stool: watery, runny, loose  Blood in stool: Unsure.   Number of loose stools in past 24 hours: 4    Progression of Symptoms:  Some days it's been the same, some is worse than other days. and intermittent.    Accompanying Signs & Symptoms:  Fever: no   Nausea or vomiting;NO  Abdominal pain: YES- once in a while  Episodes of constipation: no   Weight loss: NO    History:   Ill contacts: no   Recent use of antibiotics: no    Recent travels: no          Recent medication-new or changes(Rx or OTC): YES- culturalle     Precipitating factors:   Unsure    Alleviating factors:   Less stress    Therapies Tried and outcome:  Just a food diary, small meals     1 year hx of intermittent diarrhea.  She notes that she generally has 3-4 stools per day, most often these are loose, but occasionally watery.  No melena or hematochezia.  Some foods like coffee worsen this, nothing seems to make it batter.  She is taking Effexor and is unsure if this has worsened her diarrhea.  She increased the dosage about 6 months ago.  She has some intermittent abdominal cramping with the diarrhea, no other symptoms,  No recent travel, + antibiotics over the past 1 year        Problem list and histories reviewed & adjusted, as indicated.  Additional history: as documented    Patient Active Problem List   Diagnosis     Acute reaction to stress     HYPERLIPIDEMIA LDL GOAL <130     Advanced directives, counseling/discussion     Impaired fasting glucose     Knee pain     Family history of colon cancer     Past Surgical History:   Procedure Laterality Date     cataracts       COLONOSCOPY N/A 12/4/2014     "Procedure: COLONOSCOPY;  Surgeon: Todd Borrego MD;  Location:  GI     EYE SURGERY      Cataract surgery on both eyes     GYN SURGERY  ?    d/c     SURGICAL HISTORY OF -       s/p D&C 25 years ago     SURGICAL HISTORY OF -   2004    Colonoscopy       Social History   Substance Use Topics     Smoking status: Never Smoker     Smokeless tobacco: Never Used     Alcohol use Yes      Comment: two or more on occassion     Family History   Problem Relation Age of Onset     Cancer - colorectal Mother      80 yo      Colon Cancer Mother           CANCER Father      Brain tumor, age 46         Current Outpatient Prescriptions   Medication Sig Dispense Refill     venlafaxine (EFFEXOR-XR) 37.5 MG 24 hr capsule Take 1 capsule (37.5 mg) by mouth daily 90 capsule 0     simvastatin (ZOCOR) 40 MG tablet Take 1 tablet (40 mg) by mouth daily 90 tablet 3     venlafaxine (EFFEXOR-XR) 75 MG 24 hr capsule Take 1 capsule (75 mg) by mouth daily 90 capsule 3     cholecalciferol (VITAMIN-D) 1000 UNIT capsule Take 1 capsule by mouth daily.       UNKNOWN MED DOSAGE Flexitol heel balm: apply to feet daily as needed 1 1     loperamide (IMODIUM A-D) 2 MG tablet Take 2 tabs (4 mg) after first loose stool, and then take one tab (2 mg) after each diarrheal stool.  Max of 8 tabs (16 mg) per day. (Patient not taking: Reported on 2017) 30 tablet 0     Allergies   Allergen Reactions     Vicodin [Hydrocodone-Acetaminophen]        Reviewed and updated as needed this visit by clinical staff       Reviewed and updated as needed this visit by Provider         ROS:  Constitutional, HEENT, cardiovascular, pulmonary, gi and gu systems are negative, except as otherwise noted.    OBJECTIVE:     /77 (BP Location: Left arm, Patient Position: Chair, Cuff Size: Adult Regular)  Pulse 68  Temp 98  F (36.7  C) (Tympanic)  Resp 16  Ht 5' 9\" (1.753 m)  Wt 229 lb (103.9 kg)  SpO2 95%  BMI 33.82 kg/m2  Body mass index is 33.82 " kg/(m^2).  GENERAL: healthy, alert and no distress  RESP: lungs clear to auscultation - no rales, rhonchi or wheezes  CV: regular rate and rhythm, normal S1 S2, no S3 or S4, no murmur, click or rub  ABDOMEN: soft, nontender, no hepatosplenomegaly, no masses and bowel sounds normal    Diagnostic Test Results:  none     ASSESSMENT/PLAN:       1. Chronic diarrhea  Will check stool cultures.  Plan to decrease effexor to 37.5 mg over the next 1 month.  We will follow up at the end of the month.  If improvement in diarrhea, may switch to another SNRI or SSRI, if no improvement, will send to GI vs. Colonoscopy.  - Clostridium difficile Toxin B PCR; Future  - Enteric Bacteria and Virus Panel by SOHAIL Stool; Future    2. Acute reaction to stress  - venlafaxine (EFFEXOR-XR) 37.5 MG 24 hr capsule; Take 1 capsule (37.5 mg) by mouth daily  Dispense: 90 capsule; Refill: 0    See Patient Instructions    Antoni Oneill PA-C  [facility]

## 2017-07-06 NOTE — PROGRESS NOTES
Francisca-  Here are your recent results.     Your stool cultures and test for C diff are normal. Please continue with the lower dose of medication and follow up in 1 month. If symptoms persist, we will get colonoscopy.      If you have any questions please do not hesitate to contact our office via phone ([phone]) or you may send me a message via [employer] by clicking the contact my Care Team link.      It was a pleasure meeting you and participating in your care!    Thank you,    Antoni Oneill MPH, PA-C  [address] [place] [id number] [phone]

## 2017-08-03 ENCOUNTER — OFFICE VISIT (OUTPATIENT)
Dept: FAMILY MEDICINE | Facility: CLINIC | Age: 67
End: 2017-08-03
Payer: COMMERCIAL

## 2017-08-03 VITALS
HEART RATE: 72 BPM | WEIGHT: 228.4 LBS | BODY MASS INDEX: 33.83 KG/M2 | SYSTOLIC BLOOD PRESSURE: 120 MMHG | OXYGEN SATURATION: 96 % | RESPIRATION RATE: 18 BRPM | HEIGHT: 69 IN | DIASTOLIC BLOOD PRESSURE: 75 MMHG | TEMPERATURE: 98.9 F

## 2017-08-03 DIAGNOSIS — K52.9 CHRONIC DIARRHEA: Primary | ICD-10-CM

## 2017-08-03 PROCEDURE — 99213 OFFICE O/P EST LOW 20 MIN: CPT | Performed by: PHYSICIAN ASSISTANT

## 2017-08-03 NOTE — NURSING NOTE
"Chief Complaint   Patient presents with     Follow Up For     GI Problem       Initial /75 (BP Location: Left arm, Patient Position: Chair, Cuff Size: Adult Large)  Pulse 72  Temp 98.9  F (37.2  C) (Tympanic)  Resp 18  Ht 5' 9\" (1.753 m)  Wt 228 lb 6.4 oz (103.6 kg)  SpO2 96%  BMI 33.73 kg/m2 Estimated body mass index is 33.73 kg/(m^2) as calculated from the following:    Height as of this encounter: 5' 9\" (1.753 m).    Weight as of this encounter: 228 lb 6.4 oz (103.6 kg).  Medication Reconciliation: complete    Ivania Pardo MA  "

## 2017-08-03 NOTE — PROGRESS NOTES
SUBJECTIVE:                                                    Francisca Ramírez is a 66 year old female who presents to clinic today for the following health issues:      F/U GI Problem  Onset: last ov:07/05/2017    Description:   Pt reports symptoms are improving with GI problem (diarrhea). Pt has been cutting out dairy and watching what she eats. Ice cold water has been making stomach gargle then some diarrhea again. Pt would like medication to help with stomach when traveling.        Medication Followup of Effexor 37.5 MG    Taking Medication as prescribed: yes    Side Effects:  None    Medication Helping Symptoms:  yes         At our last visit, Francisca has been experiencing chronic intermittent diarrhea that was watery and occurring daily ( 2-3 episodes) ( see past notes).  At last visit , we decreased her Effexor to 37.5 mg to see if this helped.  She notes some mild improvement in her diarrhea, stool is now more loose than watery and is occurring less frequently ( 1-2 episodes daily).  She has no abdominal pain, but does experience stomach cramping and bloating with diarrhea episodes.  She also does experience some fecal incontinence with diarrheal episodes.  She has been cutting back on dairy which has been helping and limiting fruits.  Drinking water seems to make diarrhea worse  Stool cultures and labs have been normal.  Colonoscopy in 2014 normal  As well.   Would like to discuss next steps.      Problem list and histories reviewed & adjusted, as indicated.  Additional history: as documented    Patient Active Problem List   Diagnosis     Acute reaction to stress     HYPERLIPIDEMIA LDL GOAL <130     Advanced directives, counseling/discussion     Impaired fasting glucose     Knee pain     Family history of colon cancer     Past Surgical History:   Procedure Laterality Date     cataracts       COLONOSCOPY N/A 12/4/2014    Procedure: COLONOSCOPY;  Surgeon: Todd Borrego MD;  Location:  GI     EYE  "SURGERY      Cataract surgery on both eyes     GYN SURGERY  ?    d/c     SURGICAL HISTORY OF -       s/p D&C 25 years ago     SURGICAL HISTORY OF -   ,     Colonoscopy       Social History   Substance Use Topics     Smoking status: Never Smoker     Smokeless tobacco: Never Used     Alcohol use Yes      Comment: two or more on occassion     Family History   Problem Relation Age of Onset     Cancer - colorectal Mother      82 yo      Colon Cancer Mother           CANCER Father      Brain tumor, age 46         Current Outpatient Prescriptions   Medication Sig Dispense Refill     venlafaxine (EFFEXOR-XR) 37.5 MG 24 hr capsule Take 1 capsule (37.5 mg) by mouth daily 90 capsule 0     simvastatin (ZOCOR) 40 MG tablet Take 1 tablet (40 mg) by mouth daily 90 tablet 3     cholecalciferol (VITAMIN-D) 1000 UNIT capsule Take 1 capsule by mouth daily.       UNKNOWN MED DOSAGE Flexitol heel balm: apply to feet daily as needed 1 1     loperamide (IMODIUM A-D) 2 MG tablet Take 2 tabs (4 mg) after first loose stool, and then take one tab (2 mg) after each diarrheal stool.  Max of 8 tabs (16 mg) per day. (Patient not taking: Reported on 2017) 30 tablet 0     venlafaxine (EFFEXOR-XR) 75 MG 24 hr capsule Take 1 capsule (75 mg) by mouth daily (Patient not taking: Reported on 8/3/2017) 90 capsule 3     Allergies   Allergen Reactions     Vicodin [Hydrocodone-Acetaminophen]          Reviewed and updated as needed this visit by clinical staff     Reviewed and updated as needed this visit by Provider         ROS:  Constitutional, HEENT, cardiovascular, pulmonary, GI, , musculoskeletal, neuro, skin, endocrine and psych systems are negative, except as otherwise noted.      OBJECTIVE:   /75 (BP Location: Left arm, Patient Position: Chair, Cuff Size: Adult Large)  Pulse 72  Temp 98.9  F (37.2  C) (Tympanic)  Resp 18  Ht 5' 9\" (1.753 m)  Wt 228 lb 6.4 oz (103.6 kg)  SpO2 96%  BMI 33.73 kg/m2  Body mass index " is 33.73 kg/(m^2).  GENERAL: healthy, alert and no distress  PSYCH: mentation appears normal, affect normal/bright    Diagnostic Test Results:  none     ASSESSMENT/PLAN:             1. Chronic diarrhea  Advise that she may use imodium PRN for what is likely osmotic diarrhea. Workup to date has been normal.  Given episodes of incontinence I have advised that she follow up with GI for further workup and management  - GASTROENTEROLOGY ADULT REF CONSULT ONLY    See Patient Instructions    Total time: 20 minutes:  > 50% of time spent counseling and coordinating next steps in care    Antoni Oneill PA-C  [facility]

## 2017-08-03 NOTE — MR AVS SNAPSHOT
After Visit Summary   8/3/2017    Francisca Ramírez    MRN: [id number]           Patient Information     Date Of Birth          1950        Visit Information        Provider Department      8/3/2017 10:00 AM Antoni Oneill PA-C [facility]        Today's Diagnoses     Chronic diarrhea    -  1       Follow-ups after your visit        Additional Services     GASTROENTEROLOGY ADULT REF CONSULT ONLY       [facility] [phone]      Please be aware that coverage of these services is subject to the terms and limitations of your health insurance plan.  Call member services at your health plan with any benefit or coverage questions.  Any procedures must be performed at a [place] facility OR coordinated by your clinic's referral office.    Please bring the following with you to your appointment:    (1) Any X-Rays, CTs or MRIs which have been performed.  Contact the facility where they were done to arrange for  prior to your scheduled appointment.    (2) List of current medications   (3) This referral request   (4) Any documents/labs given to you for this referral                  Who to contact     If you have questions or need follow up information about today's clinic visit or your schedule please contact [facility] directly at [phone].  Normal or non-critical lab and imaging results will be communicated to you by MyChart, letter or phone within 4 business days after the clinic has received the results. If you do not hear from us within 7 days, please contact the clinic through MyChart or phone. If you have a critical or abnormal lab result, we will notify you by phone as soon as possible.  Submit refill requests through [employer] or call your pharmacy and they will forward the refill request to us. Please allow 3 business days for your refill to be completed.          Additional Information About Your  "Visit        MyChart Information     [employer] gives you secure access to your electronic health record. If you see a primary care provider, you can also send messages to your care team and make appointments. If you have questions, please call your primary care clinic.  If you do not have a primary care provider, please call [phone] and they will assist you.        Care EveryWhere ID     This is your Care EveryWhere ID. This could be used by other organizations to access your [place] medical records  [id number]        Your Vitals Were     Pulse Temperature Respirations Height Pulse Oximetry BMI (Body Mass Index)    72 98.9  F (37.2  C) (Tympanic) 18 5' 9\" (1.753 m) 96% 33.73 kg/m2       Blood Pressure from Last 3 Encounters:   08/03/17 120/75   07/05/17 117/77   05/17/17 112/64    Weight from Last 3 Encounters:   08/03/17 228 lb 6.4 oz (103.6 kg)   07/05/17 229 lb (103.9 kg)   05/17/17 234 lb (106.1 kg)              We Performed the Following     GASTROENTEROLOGY ADULT REF CONSULT ONLY        Primary Care Provider Office Phone # Fax #    Antoni Oneill PA-C [phone]       [facility] [address] [place] [address] [address]  [place] [address]        Equal Access to Services     MEKA MORRIS : Hadii aad ku hadasho Soomaali, waaxda luqadaha, qaybta kaalmada adeegyada, tone alves . So [facility] [phone].    ATENCIÓN: Si habla español, tiene a edmond disposición servicios gratuitos de asistencia lingüística. Llame al [phone].    We comply with applicable federal civil rights laws and [place] laws. We do not discriminate on the basis of race, color, national origin, age, disability sex, sexual orientation or gender identity.            Thank you!     Thank you for choosing [facility]  for your care. Our goal is always to provide you with excellent care. Hearing back from our patients is one way we can continue to improve our services. " Please take a few minutes to complete the written survey that you may receive in the mail after your visit with us. Thank you!             Your Updated Medication List - Protect others around you: Learn how to safely use, store and throw away your medicines at www.disposemymeds.org.          This list is accurate as of: 8/3/17 10:28 AM.  Always use your most recent med list.                   Brand Name Dispense Instructions for use Diagnosis    cholecalciferol 1000 UNITS capsule    vitamin  -D     Take 1 capsule by mouth daily.        loperamide 2 MG tablet    IMODIUM A-D    30 tablet    Take 2 tabs (4 mg) after first loose stool, and then take one tab (2 mg) after each diarrheal stool.  Max of 8 tabs (16 mg) per day.    Loose stools       simvastatin 40 MG tablet    ZOCOR    90 tablet    Take 1 tablet (40 mg) by mouth daily    Hyperlipidemia LDL goal <130       UNKNOWN MED DOSAGE     1    Flexitol heel balm: apply to feet daily as needed    Dermatophytosis of foot       * venlafaxine 75 MG 24 hr capsule    EFFEXOR-XR    90 capsule    Take 1 capsule (75 mg) by mouth daily    Acute reaction to stress       * venlafaxine 37.5 MG 24 hr capsule    EFFEXOR-XR    90 capsule    Take 1 capsule (37.5 mg) by mouth daily    Acute reaction to stress       * Notice:  This list has 2 medication(s) that are the same as other medications prescribed for you. Read the directions carefully, and ask your doctor or other care provider to review them with you.

## 2017-09-01 ENCOUNTER — TRANSFERRED RECORDS (OUTPATIENT)
Dept: HEALTH INFORMATION MANAGEMENT | Facility: CLINIC | Age: 67
End: 2017-09-01

## 2017-10-24 DIAGNOSIS — F43.0 ACUTE REACTION TO STRESS: ICD-10-CM

## 2017-10-24 RX ORDER — VENLAFAXINE HYDROCHLORIDE 37.5 MG/1
CAPSULE, EXTENDED RELEASE ORAL
Qty: 90 CAPSULE | Refills: 0 | Status: SHIPPED | OUTPATIENT
Start: 2017-10-24 | End: 2017-11-27

## 2017-10-24 NOTE — TELEPHONE ENCOUNTER
PHQ-9 SCORE 7/29/2015 11/21/2016 5/2/2017   Total Score 4 - -   Total Score - 3 3     Refill request approved per [facility] protocol    Monik Leigh RN

## 2017-11-26 DIAGNOSIS — E78.5 HYPERLIPIDEMIA LDL GOAL <130: ICD-10-CM

## 2017-11-26 RX ORDER — SIMVASTATIN 40 MG
TABLET ORAL
Qty: 90 TABLET | Refills: 0 | Status: CANCELLED | OUTPATIENT
Start: 2017-11-26

## 2017-11-27 ENCOUNTER — OFFICE VISIT (OUTPATIENT)
Dept: FAMILY MEDICINE | Facility: CLINIC | Age: 67
End: 2017-11-27
Payer: COMMERCIAL

## 2017-11-27 VITALS
SYSTOLIC BLOOD PRESSURE: 115 MMHG | HEIGHT: 69 IN | BODY MASS INDEX: 33.36 KG/M2 | DIASTOLIC BLOOD PRESSURE: 71 MMHG | TEMPERATURE: 97.8 F | WEIGHT: 225.2 LBS | OXYGEN SATURATION: 94 % | HEART RATE: 75 BPM | RESPIRATION RATE: 14 BRPM

## 2017-11-27 DIAGNOSIS — Z80.0 FAMILY HISTORY OF COLON CANCER: ICD-10-CM

## 2017-11-27 DIAGNOSIS — F43.0 ACUTE REACTION TO STRESS: ICD-10-CM

## 2017-11-27 DIAGNOSIS — D23.5 BENIGN NEOPLASM OF SKIN OF TRUNK, EXCEPT SCROTUM: ICD-10-CM

## 2017-11-27 DIAGNOSIS — E78.5 HYPERLIPIDEMIA LDL GOAL <130: ICD-10-CM

## 2017-11-27 DIAGNOSIS — K52.839 MICROSCOPIC COLITIS, UNSPECIFIED MICROSCOPIC COLITIS TYPE: ICD-10-CM

## 2017-11-27 DIAGNOSIS — Z00.00 ENCOUNTER FOR ROUTINE ADULT HEALTH EXAMINATION WITHOUT ABNORMAL FINDINGS: Primary | ICD-10-CM

## 2017-11-27 DIAGNOSIS — R73.01 IMPAIRED FASTING GLUCOSE: ICD-10-CM

## 2017-11-27 DIAGNOSIS — E66.811 CLASS 1 OBESITY WITHOUT SERIOUS COMORBIDITY WITH BODY MASS INDEX (BMI) OF 33.0 TO 33.9 IN ADULT, UNSPECIFIED OBESITY TYPE: ICD-10-CM

## 2017-11-27 LAB
ALBUMIN SERPL-MCNC: 3.6 G/DL (ref 3.4–5)
ALP SERPL-CCNC: 76 U/L (ref 40–150)
ALT SERPL W P-5'-P-CCNC: 22 U/L (ref 0–50)
ANION GAP SERPL CALCULATED.3IONS-SCNC: 7 MMOL/L (ref 3–14)
AST SERPL W P-5'-P-CCNC: 26 U/L (ref 0–45)
BILIRUB SERPL-MCNC: 0.6 MG/DL (ref 0.2–1.3)
BUN SERPL-MCNC: 13 MG/DL (ref 7–30)
CALCIUM SERPL-MCNC: 9.4 MG/DL (ref 8.5–10.1)
CHLORIDE SERPL-SCNC: 106 MMOL/L (ref 94–109)
CHOLEST SERPL-MCNC: 202 MG/DL
CO2 SERPL-SCNC: 28 MMOL/L (ref 20–32)
CREAT SERPL-MCNC: 0.8 MG/DL (ref 0.52–1.04)
GFR SERPL CREATININE-BSD FRML MDRD: 71 ML/MIN/1.7M2
GLUCOSE SERPL-MCNC: 96 MG/DL (ref 70–99)
HDLC SERPL-MCNC: 68 MG/DL
LDLC SERPL CALC-MCNC: 97 MG/DL
NONHDLC SERPL-MCNC: 134 MG/DL
POTASSIUM SERPL-SCNC: 4 MMOL/L (ref 3.4–5.3)
PROT SERPL-MCNC: 7.8 G/DL (ref 6.8–8.8)
SODIUM SERPL-SCNC: 141 MMOL/L (ref 133–144)
TRIGL SERPL-MCNC: 184 MG/DL

## 2017-11-27 PROCEDURE — 36415 COLL VENOUS BLD VENIPUNCTURE: CPT | Performed by: PHYSICIAN ASSISTANT

## 2017-11-27 PROCEDURE — 80053 COMPREHEN METABOLIC PANEL: CPT | Performed by: PHYSICIAN ASSISTANT

## 2017-11-27 PROCEDURE — 80061 LIPID PANEL: CPT | Performed by: PHYSICIAN ASSISTANT

## 2017-11-27 PROCEDURE — G0438 PPPS, INITIAL VISIT: HCPCS | Performed by: PHYSICIAN ASSISTANT

## 2017-11-27 RX ORDER — BUDESONIDE 3 MG/1
CAPSULE, COATED PELLETS ORAL
Refills: 2 | COMMUNITY
Start: 2017-09-23 | End: 2021-02-02

## 2017-11-27 RX ORDER — HYOSCYAMINE SULFATE 0.38 MG/1
TABLET, EXTENDED RELEASE ORAL
Refills: 3 | COMMUNITY
Start: 2017-10-24 | End: 2023-06-22

## 2017-11-27 RX ORDER — SIMVASTATIN 40 MG
40 TABLET ORAL DAILY
Qty: 90 TABLET | Refills: 3 | Status: SHIPPED | OUTPATIENT
Start: 2017-11-27 | End: 2018-09-21

## 2017-11-27 RX ORDER — VENLAFAXINE HYDROCHLORIDE 37.5 MG/1
37.5 CAPSULE, EXTENDED RELEASE ORAL DAILY
Qty: 90 CAPSULE | Refills: 1 | Status: SHIPPED | OUTPATIENT
Start: 2017-11-27 | End: 2018-09-18

## 2017-11-27 ASSESSMENT — ANXIETY QUESTIONNAIRES
5. BEING SO RESTLESS THAT IT IS HARD TO SIT STILL: NOT AT ALL
7. FEELING AFRAID AS IF SOMETHING AWFUL MIGHT HAPPEN: NOT AT ALL
IF YOU CHECKED OFF ANY PROBLEMS ON THIS QUESTIONNAIRE, HOW DIFFICULT HAVE THESE PROBLEMS MADE IT FOR YOU TO DO YOUR WORK, TAKE CARE OF THINGS AT HOME, OR GET ALONG WITH OTHER PEOPLE: NOT DIFFICULT AT ALL
3. WORRYING TOO MUCH ABOUT DIFFERENT THINGS: SEVERAL DAYS
2. NOT BEING ABLE TO STOP OR CONTROL WORRYING: NOT AT ALL
1. FEELING NERVOUS, ANXIOUS, OR ON EDGE: NOT AT ALL
GAD7 TOTAL SCORE: 2
6. BECOMING EASILY ANNOYED OR IRRITABLE: SEVERAL DAYS

## 2017-11-27 ASSESSMENT — PATIENT HEALTH QUESTIONNAIRE - PHQ9
SUM OF ALL RESPONSES TO PHQ QUESTIONS 1-9: 1
5. POOR APPETITE OR OVEREATING: NOT AT ALL

## 2017-11-27 NOTE — Clinical Note
Please abstract the following data from this visit with this patient into the appropriate field in Epic:  Colonoscopy done on this date: 09/2017 (approximately), by this group: Dr. Farr, results were Abnormal, showed microscopic colitis

## 2017-11-27 NOTE — PROGRESS NOTES
SUBJECTIVE:   Francisca Ramírez is a 67 year old female who presents for Preventive Visit.    Are you in the first 12 months of your Medicare Part B coverage?  No    Healthy Habits:    Do you get at least three servings of calcium containing foods daily (dairy, green leafy vegetables, etc.)? yes    Amount of exercise or daily activities, outside of work: 1 day(s) per week    Problems taking medications regularly No    Medication side effects: No    Have you had an eye exam in the past two years? yes    Do you see a dentist twice per year? yes    Do you have sleep apnea, excessive snoring or daytime drowsiness?no    COGNITIVE SCREEN  1) Repeat 3 items (Banana, Sunrise, Chair)    2) Clock draw: ABNORMAL  3) 3 item recall: Recalls 3 objects  Results: 3 items recalled: COGNITIVE IMPAIRMENT LESS LIKELY    Mini-CogTM Copyright S Elliot. Licensed by the author for use in [facility]; reprinted with permission (kale@[facility]). All rights reserved.      Was seen by Dr. Farr this summer for chronic diarrhea ( see previous notes)  Colonoscopy done and found microscopic colitis    Please abstract the following data from this visit with this patient into the appropriate field in Epic:    Colonoscopy done on this date: 09/2017 (approximately), by this group: Dr. Farr, results were Abnormal, showed microscopic colitis           Reviewed and updated as needed this visit by Provider      Social History   Substance Use Topics     Smoking status: Never Smoker     Smokeless tobacco: Never Used     Alcohol use Yes      Comment: two or more on occassion       The patient does not drink >3 drinks per day nor >7 drinks per week.     Today's PHQ-2 Score:   PHQ-2 ( 1999 [employer]) 8/3/2017 7/5/2017   Q1: Little interest or pleasure in doing things 0 0   Q2: Feeling down, depressed or hopeless 0 0   PHQ-2 Score 0 0   Q1: Little interest or pleasure in doing things - -   Q2: Feeling down, depressed or hopeless - -   PHQ-2 Score -  -         Do you feel safe in your environment - Yes    Do you have a Health Care Directive?: Yes: Patient states has Advance Directive and will bring in a copy to clinic.    Current providers sharing in care for this patient include:   Patient Care Team:  Antoni Oneill PA-C as PCP - General (Physician Assistant)      Hearing impairment: No    Ability to successfully perform activities of daily living: Yes, no assistance needed     Fall risk:         Home safety:  none identified      The following health maintenance items are reviewed in Epic and correct as of today:Health Maintenance   Topic Date Due     INFLUENZA VACCINE (SYSTEM ASSIGNED)  09/01/2017     PHQ-9 Q6 MONTHS  11/02/2017     DEPRESSION ACTION PLAN Q1 YR  11/21/2017     WELLNESS VISIT Q1 YR  11/21/2017     FALL RISK ASSESSMENT  08/03/2018     MAMMO SCREEN Q2 YR (SYSTEM ASSIGNED)  04/03/2019     TETANUS IMMUNIZATION (SYSTEM ASSIGNED)  12/25/2019     LIPID SCREEN Q5 YR FEMALE (SYSTEM ASSIGNED)  11/21/2021     ADVANCE DIRECTIVE PLANNING Q5 YRS  11/21/2021     COLON CANCER SCREEN (SYSTEM ASSIGNED)  12/04/2024     DEXA SCAN SCREENING (SYSTEM ASSIGNED)  Completed     PNEUMOCOCCAL  Completed     HEPATITIS C SCREENING  Completed     Patient Active Problem List   Diagnosis     Acute reaction to stress     HYPERLIPIDEMIA LDL GOAL <130     Advanced directives, counseling/discussion     Impaired fasting glucose     Knee pain     Family history of colon cancer     Past Surgical History:   Procedure Laterality Date     cataracts       COLONOSCOPY N/A 12/4/2014    Procedure: COLONOSCOPY;  Surgeon: Todd Borrego MD;  Location:  GI     EYE SURGERY  2014    Cataract surgery on both eyes     GYN SURGERY  ?    d/c     SURGICAL HISTORY OF -       s/p D&C 25 years ago     SURGICAL HISTORY OF -   8/99, 2004    Colonoscopy       Social History   Substance Use Topics     Smoking status: Never Smoker     Smokeless tobacco: Never Used     Alcohol use Yes       Comment: two or more on occassion     Family History   Problem Relation Age of Onset     Cancer - colorectal Mother      80 yo      Colon Cancer Mother           CANCER Father      Brain tumor, age 46         Current Outpatient Prescriptions   Medication Sig Dispense Refill     budesonide (ENTOCORT EC) 3 MG EC capsule TK 3 CS PO QD FOR 2 WEEKS THEN 2 X 2 WEEKS THEN 1 QD FOR 2 TO 3 MONTHS  2     hyoscyamine (LEVBID) 0.375 MG 12 hr tablet TK 1 T PO BID  3     simvastatin (ZOCOR) 40 MG tablet Take 1 tablet (40 mg) by mouth daily 90 tablet 3     venlafaxine (EFFEXOR-XR) 37.5 MG 24 hr capsule Take 1 capsule (37.5 mg) by mouth daily 90 capsule 1     cholecalciferol (VITAMIN-D) 1000 UNIT capsule Take 1 capsule by mouth daily.       [DISCONTINUED] venlafaxine (EFFEXOR-XR) 37.5 MG 24 hr capsule TAKE 1 CAPSULE(37.5 MG) BY MOUTH DAILY 90 capsule 0     loperamide (IMODIUM A-D) 2 MG tablet Take 2 tabs (4 mg) after first loose stool, and then take one tab (2 mg) after each diarrheal stool.  Max of 8 tabs (16 mg) per day. (Patient not taking: Reported on 2017) 30 tablet 0     venlafaxine (EFFEXOR-XR) 75 MG 24 hr capsule Take 1 capsule (75 mg) by mouth daily (Patient not taking: Reported on 8/3/2017) 90 capsule 3     [DISCONTINUED] simvastatin (ZOCOR) 40 MG tablet Take 1 tablet (40 mg) by mouth daily 90 tablet 3     UNKNOWN MED DOSAGE Flexitol heel balm: apply to feet daily as needed (Patient not taking: Reported on 2017) 1 1     Allergies   Allergen Reactions     Vicodin [Hydrocodone-Acetaminophen]      Recent Labs   Lab Test  17   1407  16   1043  16   1140  11/19/15   0841  14   0919  13   1027  12   0943   11   0908   A1C   --    --   5.8   --   5.8  5.6   --    < >   --    LDL   --   104*   --   102*  93  95   --    < >  92   HDL   --   54   --   56  58  54   --    < >  59   TRIG   --   157*   --   185*  121  184*   --    < >  125   ALT  44   --    --   28  25  29    "--    < >   --    CR  0.73  0.74   --   0.78  0.68  0.81   --    < >   --    GFRESTIMATED  80  78   --   73  87  71   --    < >   --    GFRESTBLACK  >90   GFR Calc    >90   GFR Calc     --   89  >90   GFR Calc    86   --    < >   --    POTASSIUM  3.7  3.8   --   3.9  4.3  4.1   --    < >   --    TSH   --    --    --    --    --    --   1.37   --   1.64    < > = values in this interval not displayed.            Mammogram Screening: Patient over age 50, mutual decision to screen reflected in health maintenance.      ROS:  Constitutional, HEENT, cardiovascular, pulmonary, GI, , musculoskeletal, neuro, skin, endocrine and psych systems are negative, except as otherwise noted.      OBJECTIVE:   There were no vitals taken for this visit. Estimated body mass index is 33.73 kg/(m^2) as calculated from the following:    Height as of 8/3/17: 5' 9\" (1.753 m).    Weight as of 8/3/17: 228 lb 6.4 oz (103.6 kg).  EXAM:   GENERAL: healthy, alert and no distress  EYES: Eyes grossly normal to inspection, PERRL and conjunctivae and sclerae normal  HENT: ear canals and TM's normal, nose and mouth without ulcers or lesions  NECK: no adenopathy, no asymmetry, masses, or scars and thyroid normal to palpation  RESP: lungs clear to auscultation - no rales, rhonchi or wheezes  CV: regular rate and rhythm, normal S1 S2, no S3 or S4, no murmur, click or rub  ABDOMEN: soft, nontender, no hepatosplenomegaly, no masses and bowel sounds normal  MS: no gross musculoskeletal defects noted, no edema  SKIN: numerous small seborrheic keratosis on skin of arms and legs bilaterally  NEURO: Normal strength and tone, mentation intact and speech normal  PSYCH: mentation appears normal, affect normal/bright    ASSESSMENT / PLAN:   1. Encounter for routine adult health examination without abnormal findings    2. Acute reaction to stress  Working well on lower dose, medication refilled today, see PHQ9/GAD7  - " "venlafaxine (EFFEXOR-XR) 37.5 MG 24 hr capsule; Take 1 capsule (37.5 mg) by mouth daily  Dispense: 90 capsule; Refill: 1    3. Hyperlipidemia LDL goal <130  - Lipid Profile (Chol, Trig, HDL, LDL calc)  - Comprehensive metabolic panel (BMP + Alb, Alk Phos, ALT, AST, Total. Bili, TP)  - simvastatin (ZOCOR) 40 MG tablet; Take 1 tablet (40 mg) by mouth daily  Dispense: 90 tablet; Refill: 3    4. Impaired fasting glucose  Will recheck labs today    5. Microscopic colitis, unspecified microscopic colitis type  Per GI    6. Family history of colon cancer  Colonoscopy per Gi recommendations.  Currently UTD    7. Benign neoplasm of skin of trunk, except scrotum  Many benign appearing seborrheic keratoses on exam.  Will continue to monitor      6. Class 1 obesity without serious comorbidity with body mass index (BMI) of 33.0 to 33.9 in adult, unspecified obesity type  Will continue to follow               End of Life Planning:  Patient currently has an advanced directive: No.  I have verified the patient's ablity to prepare an advanced directive/make health care decisions.  Literature was provided to assist patient in preparing an advanced directive.    COUNSELING:  Reviewed preventive health counseling, as reflected in patient instructions       Regular exercise       Healthy diet/nutrition        Estimated body mass index is 33.73 kg/(m^2) as calculated from the following:    Height as of 8/3/17: 5' 9\" (1.753 m).    Weight as of 8/3/17: 228 lb 6.4 oz (103.6 kg).  Weight management plan: Discussed healthy diet and exercise guidelines and patient will follow up in 12 months in clinic to re-evaluate.   reports that she has never smoked. She has never used smokeless tobacco.        Appropriate preventive services were discussed with this patient, including applicable screening as appropriate for cardiovascular disease, diabetes, osteopenia/osteoporosis, and glaucoma.  As appropriate for age/gender, discussed screening for " colorectal cancer, prostate cancer, breast cancer, and cervical cancer. Checklist reviewing preventive services available has been given to the patient.    Reviewed patients plan of care and provided an AVS. The Basic Care Plan (routine screening as documented in Health Maintenance) for Francisca meets the Care Plan requirement. This Care Plan has been established and reviewed with the Patient.    Counseling Resources:  ATP IV Guidelines  Pooled Cohorts Equation Calculator  Breast Cancer Risk Calculator  FRAX Risk Assessment  ICSI Preventive Guidelines  Dietary Guidelines for Americans, 2010  USDA's MyPlate  ASA Prophylaxis  Lung CA Screening    Antoni Oneill PA-C  [facility] [place], [place]

## 2017-11-27 NOTE — LETTER
My Depression Action Plan  Name: Francisca Ramírez   Date of Birth 1950  Date: 11/27/2017    My doctor: Antoni Oneill   My clinic: [address] [address]  [phone]          GREEN    ZONE   Good Control    What it looks like:     Things are going generally well. You have normal up s and down s. You may even feel depressed from time to time, but bad moods usually last less than a day.   What you need to do:  1. Continue to care for yourself (see self care plan)  2. Check your depression survival kit and update it as needed  3. Follow your physician s recommendations including any medication.  4. Do not stop taking medication unless you consult with your physician first.           YELLOW         ZONE Getting Worse    What it looks like:     Depression is starting to interfere with your life.     It may be hard to get out of bed; you may be starting to isolate yourself from others.    Symptoms of depression are starting to last most all day and this has happened for several days.     You may have suicidal thoughts but they are not constant.   What you need to do:     1. Call your care team, your response to treatment will improve if you keep your care team informed of your progress. Yellow periods are signs an adjustment may need to be made.     2. Continue your self-care, even if you have to fake it!    3. Talk to someone in your support network    4. Open up your depression survival kit           RED    ZONE Medical Alert - Get Help    What it looks like:     Depression is seriously interfering with your life.     You may experience these or other symptoms: You can t get out of bed most days, can t work or engage in other necessary activities, you have trouble taking care of basic hygiene, or basic responsibilities, thoughts of suicide or death that will not go away, self-injurious behavior.     What you need to do:  1. Call  your care team and request a same-day appointment. If they are not available (weekends or after hours) call your local crisis line, emergency room or 911.      Electronically signed by: Antoni nOeill, November 27, 2017    Depression Self Care Plan / Survival Kit    Self-Care for Depression  Here s the deal. Your body and mind are really not as separate as most people think.  What you do and think affects how you feel and how you feel influences what you do and think. This means if you do things that people who feel good do, it will help you feel better.  Sometimes this is all it takes.  There is also a place for medication and therapy depending on how severe your depression is, so be sure to consult with your medical provider and/ or Behavioral Health Consultant if your symptoms are worsening or not improving.     In order to better manage my stress, I will:    Exercise  Get some form of exercise, every day. This will help reduce pain and release endorphins, the  feel good  chemicals in your brain. This is almost as good as taking antidepressants!  This is not the same as joining a gym and then never going! (they count on that by the way ) It can be as simple as just going for a walk or doing some gardening, anything that will get you moving.      Hygiene   Maintain good hygiene (Get out of bed in the morning, Make your bed, Brush your teeth, Take a shower, and Get dressed like you were going to work, even if you are unemployed).  If your clothes don't fit try to get ones that do.    Diet  I will strive to eat foods that are good for me, drink plenty of water, and avoid excessive sugar, caffeine, alcohol, and other mood-altering substances.  Some foods that are helpful in depression are: complex carbohydrates, B vitamins, flaxseed, fish or fish oil, fresh fruits and vegetables.    Psychotherapy  I agree to participate in Individual Therapy (if recommended).    Medication  If prescribed medications, I agree  to take them.  Missing doses can result in serious side effects.  I understand that drinking alcohol, or other illicit drug use, may cause potential side effects.  I will not stop my medication abruptly without first discussing it with my provider.    Staying Connected With Others  I will stay in touch with my friends, family members, and my primary care provider/team.    Use your imagination  Be creative.  We all have a creative side; it doesn t matter if it s oil painting, sand castles, or mud pies! This will also kick up the endorphins.    Witness Beauty  (AKA stop and smell the roses) Take a look outside, even in mid-winter. Notice colors, textures. Watch the squirrels and birds.     Service to others  Be of service to others.  There is always someone else in need.  By helping others we can  get out of ourselves  and remember the really important things.  This also provides opportunities for practicing all the other parts of the program.    Humor  Laugh and be silly!  Adjust your TV habits for less news and crime-drama and more comedy.    Control your stress  Try breathing deep, massage therapy, biofeedback, and meditation. Find time to relax each day.     My support system    Clinic Contact:  Phone number:    Contact 1:  Phone number:    Contact 2:  Phone number:    Confucianism/:  Phone number:    Therapist:  Phone number:    Local crisis center:    Phone number:    Other community support:  Phone number:

## 2017-11-27 NOTE — MR AVS SNAPSHOT
After Visit Summary   11/27/2017    Francisca Ramírez    MRN: [id number]           Patient Information     Date Of Birth          1950        Visit Information        Provider Department      11/27/2017 8:40 AM Antoni Oneill PA-C [facility] [place]'s Diagnoses     Encounter for routine adult health examination without abnormal findings    -  1    Acute reaction to stress        Hyperlipidemia LDL goal <130        Impaired fasting glucose        Microscopic colitis, unspecified microscopic colitis type        Family history of colon cancer          Care Instructions      Preventive Health Recommendations    Female Ages 65 +    Yearly exam:     See your health care provider every year in order to  o Review health changes.   o Discuss preventive care.    o Review your medicines if your doctor has prescribed any.      You no longer need a yearly Pap test unless you've had an abnormal Pap test in the past 10 years. If you have vaginal symptoms, such as bleeding or discharge, be sure to talk with your provider about a Pap test.      Every 1 to 2 years, have a mammogram.  If you are over 69, talk with your health care provider about whether or not you want to continue having screening mammograms.      Every 10 years, have a colonoscopy. Or, have a yearly FIT test (stool test). These exams will check for colon cancer.       Have a cholesterol test every 5 years, or more often if your doctor advises it.       Have a diabetes test (fasting glucose) every three years. If you are at risk for diabetes, you should have this test more often.       At age 65, have a bone density scan (DEXA) to check for osteoporosis (brittle bone disease).    Shots:    Get a flu shot each year.    Get a tetanus shot every 10 years.    Talk to your doctor about your pneumonia vaccines. There are now two you should receive - Pneumovax (PPSV 23) and Prevnar (PCV 13).    Talk to your doctor about  the shingles vaccine.    Talk to your doctor about the hepatitis B vaccine.    Nutrition:     Eat at least 5 servings of fruits and vegetables each day.      Eat whole-grain bread, whole-wheat pasta and brown rice instead of white grains and rice.      Talk to your provider about Calcium and Vitamin D.     Lifestyle    Exercise at least 150 minutes a week (30 minutes a day, 5 days a week). This will help you control your weight and prevent disease.      Limit alcohol to one drink per day.      No smoking.       Wear sunscreen to prevent skin cancer.       See your dentist twice a year for an exam and cleaning.      See your eye doctor every 1 to 2 years to screen for conditions such as glaucoma, macular degeneration and cataracts.          Follow-ups after your visit        Follow-up notes from your care team     Return in about 1 year (around 11/27/2018).      Who to contact     If you have questions or need follow up information about today's clinic visit or your schedule please contact [facility] [place] directly at [phone].  Normal or non-critical lab and imaging results will be communicated to you by [employer], letter or phone within 4 business days after the clinic has received the results. If you do not hear from us within 7 days, please contact the clinic through [employer] or phone. If you have a critical or abnormal lab result, we will notify you by phone as soon as possible.  Submit refill requests through [employer] or call your pharmacy and they will forward the refill request to us. Please allow 3 business days for your refill to be completed.          Additional Information About Your Visit        [employer] Information     [employer] gives you secure access to your electronic health record. If you see a primary care provider, you can also send messages to your care team and make appointments. If you have questions, please call your primary care clinic.  If you do not have a primary care provider,  "please call [phone] and they will assist you.        Care EveryWhere ID     This is your Care EveryWhere ID. This could be used by other organizations to access your [place] medical records  [id number]        Your Vitals Were     Pulse Temperature Respirations Height Pulse Oximetry BMI (Body Mass Index)    75 97.8  F (36.6  C) (Tympanic) 14 5' 9\" (1.753 m) 94% 33.26 kg/m2       Blood Pressure from Last 3 Encounters:   11/27/17 115/71   08/03/17 120/75   07/05/17 117/77    Weight from Last 3 Encounters:   11/27/17 225 lb 3.2 oz (102.2 kg)   08/03/17 228 lb 6.4 oz (103.6 kg)   07/05/17 229 lb (103.9 kg)              We Performed the Following     Comprehensive metabolic panel (BMP + Alb, Alk Phos, ALT, AST, Total. Bili, TP)     DEPRESSION ACTION PLAN (DAP)     Lipid Profile (Chol, Trig, HDL, LDL calc)          Today's Medication Changes          These changes are accurate as of: 11/27/17  9:38 AM.  If you have any questions, ask your nurse or doctor.               These medicines have changed or have updated prescriptions.        Dose/Directions    * venlafaxine 75 MG 24 hr capsule   Commonly known as:  EFFEXOR-XR   This may have changed:  Another medication with the same name was changed. Make sure you understand how and when to take each.   Used for:  Acute reaction to stress   Changed by:  Weiler, Karen, MD        Dose:  75 mg   Take 1 capsule (75 mg) by mouth daily   Quantity:  90 capsule   Refills:  3       * venlafaxine 37.5 MG 24 hr capsule   Commonly known as:  EFFEXOR-XR   This may have changed:  See the new instructions.   Used for:  Acute reaction to stress   Changed by:  Antoni Oneill PA-C        Dose:  37.5 mg   Take 1 capsule (37.5 mg) by mouth daily   Quantity:  90 capsule   Refills:  1       * Notice:  This list has 2 medication(s) that are the same as other medications prescribed for you. Read the directions carefully, and ask your doctor or other care provider to review them with " you.         Where to get your medicines      These medications were sent to [id number] - [place] - [address]   [address] [address]     Phone:  [phone]     simvastatin 40 MG tablet    venlafaxine 37.5 MG 24 hr capsule                Primary Care Provider Office Phone # Fax #    Antoni Oneill PA-C [phone]       [address]  [place] [address]        Equal Access to Services     : Hadii aad ku hadasho Soomaali, waaxda luqadaha, qaybta kaalmada adeegyada, waxaurora barahonain hayaan олег alves . So [facility] [phone].    ATENCIÓN: Si habla español, tiene a edmond disposición servicios gratuitos de asistencia lingüística. [facility] [phone].    We comply with applicable federal civil rights laws and [place] laws. We do not discriminate on the basis of race, color, national origin, age, disability, sex, sexual orientation, or gender identity.            Thank you!     Thank you for choosing [facility] [place]  for your care. Our goal is always to provide you with excellent care. Hearing back from our patients is one way we can continue to improve our services. Please take a few minutes to complete the written survey that you may receive in the mail after your visit with us. Thank you!             Your Updated Medication List - Protect others around you: Learn how to safely use, store and throw away your medicines at www.disposemymeds.org.          This list is accurate as of: 11/27/17  9:38 AM.  Always use your most recent med list.                   Brand Name Dispense Instructions for use Diagnosis    budesonide 3 MG EC capsule    ENTOCORT EC     TK 3 CS PO QD FOR 2 WEEKS THEN 2 X 2 WEEKS THEN 1 QD FOR 2 TO 3 MONTHS        cholecalciferol 1000 UNITS capsule    vitamin  -D     Take 1 capsule by mouth daily.        hyoscyamine 0.375 MG 12 hr tablet    LEVBID     TK 1 T PO BID         loperamide 2 MG tablet    IMODIUM A-D    30 tablet    Take 2 tabs (4 mg) after first loose stool, and then take one tab (2 mg) after each diarrheal stool.  Max of 8 tabs (16 mg) per day.    Loose stools       simvastatin 40 MG tablet    ZOCOR    90 tablet    Take 1 tablet (40 mg) by mouth daily    Hyperlipidemia LDL goal <130       UNKNOWN MED DOSAGE     1    Flexitol heel balm: apply to feet daily as needed    Dermatophytosis of foot       * venlafaxine 75 MG 24 hr capsule    EFFEXOR-XR    90 capsule    Take 1 capsule (75 mg) by mouth daily    Acute reaction to stress       * venlafaxine 37.5 MG 24 hr capsule    EFFEXOR-XR    90 capsule    Take 1 capsule (37.5 mg) by mouth daily    Acute reaction to stress       * Notice:  This list has 2 medication(s) that are the same as other medications prescribed for you. Read the directions carefully, and ask your doctor or other care provider to review them with you.

## 2017-11-28 ASSESSMENT — ANXIETY QUESTIONNAIRES: GAD7 TOTAL SCORE: 2

## 2017-11-29 NOTE — PROGRESS NOTES
Francisca-  Here are your recent results.       -Liver and gallbladder tests are normal. (ALT,AST, Alk phos, bilirubin), kidney function is normal (Cr, GFR), Sodium is normal, Potassium is normal, Calcium is normal, Glucose is normal (diabetes screening test).   -Cholesterol levels are at your goal levels.  ADVISE: Continuing your medication, a regular exercise program with at least 30 minutes of aerobic exercise 3-4 days/week ( 45 minutes 4-6 days/week if weight loss needed), and a low saturated fat,/low carbohydrate diet are helpful to maintain this.  Rechecking your fasting cholesterol panel in 12 months is recommended     If you have any questions please do not hesitate to contact our office via phone ([phone]) or you may send me a message via [employer] by clicking the contact my Care Team link.      It was a pleasure meeting you and participating in your care!    Thank you,    Antoni Oneill MPH, PA-C  [address] [place] [address] [phone]

## 2018-02-02 ENCOUNTER — TRANSFERRED RECORDS (OUTPATIENT)
Dept: HEALTH INFORMATION MANAGEMENT | Facility: CLINIC | Age: 68
End: 2018-02-02

## 2018-02-02 LAB
ALT SERPL-CCNC: 18 IU/L (ref 0–32)
AST SERPL-CCNC: 24 IU/L (ref 0–40)
CREAT SERPL-MCNC: 0.75 MG/DL (ref 0.57–1)
GFR SERPL CREATININE-BSD FRML MDRD: 82 ML/MIN/1.73M2
GLUCOSE SERPL-MCNC: 90 MG/DL (ref 65–99)
POTASSIUM SERPL-SCNC: 4.6 MMOL/L (ref 3.5–5.2)

## 2018-04-02 ENCOUNTER — TRANSFERRED RECORDS (OUTPATIENT)
Dept: HEALTH INFORMATION MANAGEMENT | Facility: CLINIC | Age: 68
End: 2018-04-02

## 2018-04-02 LAB
ALT SERPL-CCNC: 15 IU/L (ref 0–32)
AST SERPL-CCNC: 23 IU/L (ref 0–40)
CREAT SERPL-MCNC: 0.73 MG/DL (ref 0.57–1)
GFR SERPL CREATININE-BSD FRML MDRD: 84 ML/MIN/1.73
GLUCOSE SERPL-MCNC: 85 MG/DL (ref 65–99)
POTASSIUM SERPL-SCNC: 4.9 MMOL/L (ref 3.5–5.2)

## 2018-04-30 ENCOUNTER — OFFICE VISIT (OUTPATIENT)
Dept: FAMILY MEDICINE | Facility: CLINIC | Age: 68
End: 2018-04-30
Payer: COMMERCIAL

## 2018-04-30 VITALS
DIASTOLIC BLOOD PRESSURE: 61 MMHG | SYSTOLIC BLOOD PRESSURE: 113 MMHG | WEIGHT: 218 LBS | OXYGEN SATURATION: 98 % | HEART RATE: 78 BPM | HEIGHT: 69 IN | BODY MASS INDEX: 32.29 KG/M2

## 2018-04-30 DIAGNOSIS — J32.9 SINUSITIS, UNSPECIFIED CHRONICITY, UNSPECIFIED LOCATION: Primary | ICD-10-CM

## 2018-04-30 PROCEDURE — 99213 OFFICE O/P EST LOW 20 MIN: CPT | Performed by: PHYSICIAN ASSISTANT

## 2018-04-30 ASSESSMENT — PATIENT HEALTH QUESTIONNAIRE - PHQ9: 5. POOR APPETITE OR OVEREATING: MORE THAN HALF THE DAYS

## 2018-04-30 ASSESSMENT — ANXIETY QUESTIONNAIRES
7. FEELING AFRAID AS IF SOMETHING AWFUL MIGHT HAPPEN: NOT AT ALL
IF YOU CHECKED OFF ANY PROBLEMS ON THIS QUESTIONNAIRE, HOW DIFFICULT HAVE THESE PROBLEMS MADE IT FOR YOU TO DO YOUR WORK, TAKE CARE OF THINGS AT HOME, OR GET ALONG WITH OTHER PEOPLE: SOMEWHAT DIFFICULT
1. FEELING NERVOUS, ANXIOUS, OR ON EDGE: MORE THAN HALF THE DAYS
3. WORRYING TOO MUCH ABOUT DIFFERENT THINGS: NEARLY EVERY DAY
5. BEING SO RESTLESS THAT IT IS HARD TO SIT STILL: NOT AT ALL
6. BECOMING EASILY ANNOYED OR IRRITABLE: SEVERAL DAYS
GAD7 TOTAL SCORE: 11
2. NOT BEING ABLE TO STOP OR CONTROL WORRYING: NEARLY EVERY DAY

## 2018-04-30 NOTE — PROGRESS NOTES
SUBJECTIVE:   Francisca Ramírez is a 67 year old female who presents to clinic today for the following health issues:      Acute Illness   Acute illness concerns: Sinus problem   Onset: x 1 month     Fever: no    Chills/Sweats: no    Headache (location?): YES    Sinus Pressure:YES    Conjunctivitis:  no    Ear Pain: YES - Bilateral     Rhinorrhea: YES    Congestion: YES    Sore Throat: no     Cough: YES    Wheeze: no    Decreased Appetite: no    Nausea: no    Vomiting: no    Diarrhea:  no    Dysuria/Freq.: no    Fatigue/Achiness: YES    Sick/Strep Exposure: no     Therapies Tried and outcome: None           Problem list and histories reviewed & adjusted, as indicated.  Additional history: as documented    Patient Active Problem List   Diagnosis     Acute reaction to stress     HYPERLIPIDEMIA LDL GOAL <130     Advanced directives, counseling/discussion     Impaired fasting glucose     Knee pain     Family history of colon cancer     Benign neoplasm of skin of trunk, except scrotum     Microscopic colitis, unspecified microscopic colitis type     Past Surgical History:   Procedure Laterality Date     cataracts       COLONOSCOPY N/A 2014    Procedure: COLONOSCOPY;  Surgeon: Todd Borrego MD;  Location:  GI     EYE SURGERY      Cataract surgery on both eyes     GYN SURGERY  ?    d/c     SURGICAL HISTORY OF -       s/p D&C 25 years ago     SURGICAL HISTORY OF -   ,     Colonoscopy       Social History   Substance Use Topics     Smoking status: Never Smoker     Smokeless tobacco: Never Used     Alcohol use Yes      Comment: two or more on occassion     Family History   Problem Relation Age of Onset     Cancer - colorectal Mother      80 yo      Colon Cancer Mother           CANCER Father      Brain tumor, age 46         Current Outpatient Prescriptions   Medication Sig Dispense Refill     amoxicillin-clavulanate (AUGMENTIN) 875-125 MG per tablet Take 1 tablet by mouth 2 times daily  "for 10 days 20 tablet 0     budesonide (ENTOCORT EC) 3 MG EC capsule TK 3 CS PO QD FOR 2 WEEKS THEN 2 X 2 WEEKS THEN 1 QD FOR 2 TO 3 MONTHS  2     cholecalciferol (VITAMIN-D) 1000 UNIT capsule Take 1 capsule by mouth daily.       hyoscyamine (LEVBID) 0.375 MG 12 hr tablet TK 1 T PO BID  3     simvastatin (ZOCOR) 40 MG tablet Take 1 tablet (40 mg) by mouth daily 90 tablet 3     UNKNOWN MED DOSAGE Flexitol heel balm: apply to feet daily as needed 1 1     venlafaxine (EFFEXOR-XR) 37.5 MG 24 hr capsule Take 1 capsule (37.5 mg) by mouth daily 90 capsule 1     [DISCONTINUED] venlafaxine (EFFEXOR-XR) 75 MG 24 hr capsule Take 1 capsule (75 mg) by mouth daily 90 capsule 3     Allergies   Allergen Reactions     Vicodin [Hydrocodone-Acetaminophen]        Reviewed and updated as needed this visit by clinical staff       Reviewed and updated as needed this visit by Provider         ROS:  Constitutional, HEENT, cardiovascular, pulmonary, gi and gu systems are negative, except as otherwise noted.    OBJECTIVE:     /61 (BP Location: Left arm, Patient Position: Chair, Cuff Size: Adult Large)  Pulse 78  Ht 5' 9\" (1.753 m)  Wt 218 lb (98.9 kg)  SpO2 98%  Breastfeeding? No  BMI 32.19 kg/m2  Body mass index is 32.19 kg/(m^2).  GENERAL: healthy, alert and no distress  EYES: Eyes grossly normal to inspection  HENT: ear canals and TM's normal, nose and mouth without ulcers or lesions, TTP along bilateral frontal sinuses  NECK: no adenopathy  RESP: lungs clear to auscultation - no rales, rhonchi or wheezes  CV: regular rate and rhythm, normal S1 S2, no S3 or S4, no murmur    Diagnostic Test Results:  none     ASSESSMENT/PLAN:       1. Sinusitis, unspecified chronicity, unspecified location  1 month hx of worsening sinus pressure,facial pressure and post nasal drip.  Will treat with Augmentin, follow up in 3-4 days if no improvement  - amoxicillin-clavulanate (AUGMENTIN) 875-125 MG per tablet; Take 1 tablet by mouth 2 times daily " for 10 days  Dispense: 20 tablet; Refill: 0    See Patient Instructions    Antoni Oneill PA-C  [facility] [place]

## 2018-04-30 NOTE — NURSING NOTE
"Chief Complaint   Patient presents with     Sinus Problem       Initial /61 (BP Location: Left arm, Patient Position: Chair, Cuff Size: Adult Large)  Pulse 78  Ht 5' 9\" (1.753 m)  Wt 218 lb (98.9 kg)  SpO2 98%  Breastfeeding? No  BMI 32.19 kg/m2 Estimated body mass index is 32.19 kg/(m^2) as calculated from the following:    Height as of this encounter: 5' 9\" (1.753 m).    Weight as of this encounter: 218 lb (98.9 kg).  Medication Reconciliation: complete     Yolanda Barboza MA     "

## 2018-04-30 NOTE — MR AVS SNAPSHOT
"              After Visit Summary   4/30/2018    Francisca Ramírez    MRN: [id number]           Patient Information     Date Of Birth          1950        Visit Information        Provider Department      4/30/2018 1:40 PM Antoni Oneill PA-C [facility] [place]        Today's Diagnoses     Sinusitis, unspecified chronicity, unspecified location    -  1       Follow-ups after your visit        Follow-up notes from your care team     Return in about 1 week (around 5/7/2018) for if peristent symptoms.      Who to contact     If you have questions or need follow up information about today's clinic visit or your schedule please contact [facility] [place] directly at [phone].  Normal or non-critical lab and imaging results will be communicated to you by [employer], letter or phone within 4 business days after the clinic has received the results. If you do not hear from us within 7 days, please contact the clinic through [employer] or phone. If you have a critical or abnormal lab result, we will notify you by phone as soon as possible.  Submit refill requests through [employer] or call your pharmacy and they will forward the refill request to us. Please allow 3 business days for your refill to be completed.          Additional Information About Your Visit        MyChart Information     [employer] gives you secure access to your electronic health record. If you see a primary care provider, you can also send messages to your care team and make appointments. If you have questions, please call your primary care clinic.  If you do not have a primary care provider, please call [phone] and they will assist you.        Care EveryWhere ID     This is your Care EveryWhere ID. This could be used by other organizations to access your [place] medical records  [id number]        Your Vitals Were     Pulse Height Pulse Oximetry Breastfeeding? BMI (Body Mass Index)       78 5' 9\" (1.753 m) 98% No 32.19 " kg/m2        Blood Pressure from Last 3 Encounters:   04/30/18 113/61   11/27/17 115/71   08/03/17 120/75    Weight from Last 3 Encounters:   04/30/18 218 lb (98.9 kg)   11/27/17 225 lb 3.2 oz (102.2 kg)   08/03/17 228 lb 6.4 oz (103.6 kg)              Today, you had the following     No orders found for display         Today's Medication Changes          These changes are accurate as of 4/30/18  1:52 PM.  If you have any questions, ask your nurse or doctor.               Start taking these medicines.        Dose/Directions    amoxicillin-clavulanate 875-125 MG per tablet   Commonly known as:  AUGMENTIN   Used for:  Sinusitis, unspecified chronicity, unspecified location   Started by:  Antoni Oneill PA-C        Dose:  1 tablet   Take 1 tablet by mouth 2 times daily for 10 days   Quantity:  20 tablet   Refills:  0            Where to get your medicines      These medications were sent to [id number]  [place] - [address]   [address] [address]     Phone:  [phone]     amoxicillin-clavulanate 875-125 MG per tablet                Primary Care Provider Office Phone # Fax #    Antoni Oneill PA-C [phone]       [address]  [place] [address]        Equal Access to Services     [facility] AH: Hadii aad ku hadasho Soomaali, waaxda luqadaha, qaybta kaalmada adeegyada, waxay elisabet mcgregor ademisbah alves . So [facility] [phone].    ATENCIÓN: Si habla español, tiene a edmond disposición servicios gratuitos de asistencia lingüística. Llame al [phone].    We comply with applicable federal civil rights laws and [place] laws. We do not discriminate on the basis of race, color, national origin, age, disability, sex, sexual orientation, or gender identity.            Thank you!     Thank you for choosing [facility] [place]  for your care. Our goal is always to provide you with excellent care.  Hearing back from our patients is one way we can continue to improve our services. Please take a few minutes to complete the written survey that you may receive in the mail after your visit with us. Thank you!             Your Updated Medication List - Protect others around you: Learn how to safely use, store and throw away your medicines at www.disposemymeds.org.          This list is accurate as of 4/30/18  1:52 PM.  Always use your most recent med list.                   Brand Name Dispense Instructions for use Diagnosis    amoxicillin-clavulanate 875-125 MG per tablet    AUGMENTIN    20 tablet    Take 1 tablet by mouth 2 times daily for 10 days    Sinusitis, unspecified chronicity, unspecified location       budesonide 3 MG EC capsule    ENTOCORT EC     TK 3 CS PO QD FOR 2 WEEKS THEN 2 X 2 WEEKS THEN 1 QD FOR 2 TO 3 MONTHS        cholecalciferol 1000 units capsule    vitamin  -D     Take 1 capsule by mouth daily.        hyoscyamine 0.375 MG 12 hr tablet    LEVBID     TK 1 T PO BID        simvastatin 40 MG tablet    ZOCOR    90 tablet    Take 1 tablet (40 mg) by mouth daily    Hyperlipidemia LDL goal <130       UNKNOWN MED DOSAGE     1    Flexitol heel balm: apply to feet daily as needed    Dermatophytosis of foot       venlafaxine 37.5 MG 24 hr capsule    EFFEXOR-XR    90 capsule    Take 1 capsule (37.5 mg) by mouth daily    Acute reaction to stress

## 2018-05-01 ASSESSMENT — ANXIETY QUESTIONNAIRES: GAD7 TOTAL SCORE: 11

## 2018-05-01 ASSESSMENT — PATIENT HEALTH QUESTIONNAIRE - PHQ9: SUM OF ALL RESPONSES TO PHQ QUESTIONS 1-9: 9

## 2018-08-01 ENCOUNTER — OFFICE VISIT (OUTPATIENT)
Dept: FAMILY MEDICINE | Facility: CLINIC | Age: 68
End: 2018-08-01
Payer: COMMERCIAL

## 2018-08-01 VITALS
DIASTOLIC BLOOD PRESSURE: 64 MMHG | TEMPERATURE: 97.4 F | SYSTOLIC BLOOD PRESSURE: 110 MMHG | WEIGHT: 219 LBS | HEART RATE: 84 BPM | BODY MASS INDEX: 32.34 KG/M2 | OXYGEN SATURATION: 96 %

## 2018-08-01 DIAGNOSIS — M17.11 ARTHRITIS OF RIGHT KNEE: Primary | ICD-10-CM

## 2018-08-01 PROCEDURE — 99213 OFFICE O/P EST LOW 20 MIN: CPT | Performed by: INTERNAL MEDICINE

## 2018-08-01 NOTE — MR AVS SNAPSHOT
After Visit Summary   8/1/2018    Francisca Ramírez    MRN: [id number]           Patient Information     Date Of Birth          1950        Visit Information        Provider Department      8/1/2018 4:00 PM Elsy Aldrich MD [facility]        Today's Diagnoses     Arthritis of right knee    -  1      Care Instructions    You can try tumeric or chondroitin supplements.      Tylenol and ibuprofen are over the counter pain options.      Try a knee brace.      You can use heat and/or ice.           Follow-ups after your visit        Additional Services     [facility] PT, HAND, AND CHIROPRACTIC REFERRAL       **This order will print in the [facility] Scheduling Office**    Physical Therapy, Hand Therapy and Chiropractic Care are available through:    *[facility]  *[facility]  *[facility]    Call one number to schedule at any of the above locations: [phone].    Your provider has referred you to: Physical Therapy at [facility] or [facility]    Indication/Reason for Referral: Knee Pain  Onset of Illness: months  Therapy Orders: Evaluate and Treat  Special Programs: None  Special Request: None    Shanita Diehl      Additional Comments for the Therapist or Chiropractor: none    Please be aware that coverage of these services is subject to the terms and limitations of your health insurance plan.  Call member services at your health plan with any benefit or coverage questions.      Please bring the following to your appointment:    *Your personal calendar for scheduling future appointments  *Comfortable clothing                  Follow-up notes from your care team     Return if symptoms worsen or fail to improve.      Who to contact     If you have questions or need follow up information about today's clinic visit or your schedule please contact [facility] [place] directly at [phone].  Normal or non-critical lab and imaging  results will be communicated to you by MyChart, letter or phone within 4 business days after the clinic has received the results. If you do not hear from us within 7 days, please contact the clinic through [employer] or phone. If you have a critical or abnormal lab result, we will notify you by phone as soon as possible.  Submit refill requests through [employer] or call your pharmacy and they will forward the refill request to us. Please allow 3 business days for your refill to be completed.          Additional Information About Your Visit        [employer] Information     [employer] gives you secure access to your electronic health record. If you see a primary care provider, you can also send messages to your care team and make appointments. If you have questions, please call your primary care clinic.  If you do not have a primary care provider, please call [phone] and they will assist you.        Care EveryWhere ID     This is your Care EveryWhere ID. This could be used by other organizations to access your [place] medical records  [id number]        Your Vitals Were     Pulse Temperature Pulse Oximetry BMI (Body Mass Index)          84 97.4  F (36.3  C) (Tympanic) 96% 32.34 kg/m2         Blood Pressure from Last 3 Encounters:   08/01/18 110/64   04/30/18 113/61   11/27/17 115/71    Weight from Last 3 Encounters:   08/01/18 219 lb (99.3 kg)   04/30/18 218 lb (98.9 kg)   11/27/17 225 lb 3.2 oz (102.2 kg)              We Performed the Following     [facility] PT, HAND, AND CHIROPRACTIC REFERRAL        Primary Care Provider Office Phone # Fax #    Antoni Oneill PA-C [phone]       [address]  [place] [address]        Equal Access to Services     [facility] : Hadii corrina Kaur, waaxda luqadaha, qaybta kaalmada олегyada, tone paredes. So [facility] [phone].    ATENCIÓN: Si habla español, tiene a edmond disposición servicios gratuitos de asistencia  lingüística. Doroteo al [phone].    We comply with applicable federal civil rights laws and [place] laws. We do not discriminate on the basis of race, color, national origin, age, disability, sex, sexual orientation, or gender identity.            Thank you!     Thank you for choosing [facility] [place]  for your care. Our goal is always to provide you with excellent care. Hearing back from our patients is one way we can continue to improve our services. Please take a few minutes to complete the written survey that you may receive in the mail after your visit with us. Thank you!             Your Updated Medication List - Protect others around you: Learn how to safely use, store and throw away your medicines at www.disposemymeds.org.          This list is accurate as of 8/1/18  4:25 PM.  Always use your most recent med list.                   Brand Name Dispense Instructions for use Diagnosis    budesonide 3 MG EC capsule    ENTOCORT EC     TK 3 CS PO QD FOR 2 WEEKS THEN 2 X 2 WEEKS THEN 1 QD FOR 2 TO 3 MONTHS        cholecalciferol 1000 units capsule    vitamin  -D     Take 1 capsule by mouth daily.        hyoscyamine 0.375 MG 12 hr tablet    LEVBID     TK 1 T PO BID        simvastatin 40 MG tablet    ZOCOR    90 tablet    Take 1 tablet (40 mg) by mouth daily    Hyperlipidemia LDL goal <130       UNKNOWN MED DOSAGE     1    Flexitol heel balm: apply to feet daily as needed    Dermatophytosis of foot       venlafaxine 37.5 MG 24 hr capsule    EFFEXOR-XR    90 capsule    Take 1 capsule (37.5 mg) by mouth daily    Acute reaction to stress

## 2018-08-01 NOTE — PROGRESS NOTES
SUBJECTIVE:   Francisca Ramírez is a 67 year old female who presents to clinic today for the following health issues:      Musculoskeletal problem/pain      Duration: 2 weeks     Description  Location: medial right knee     Intensity:  moderate    Accompanying signs and symptoms: swelling    History  Previous similar problem: no   Previous evaluation:  none    Precipitating or alleviating factors:  Trauma or overuse: YES- just being on her feet at work   Aggravating factors include: walking and climbing stairs and going down stairs     Therapies tried and outcome: nothing, elevation     Francisca is here with right-sided knee pain.  It started acting up a few months ago when she was working on cement floors all day at [employer].  After stopping that job the knee pain improved, but she is now working at [employer] and on her feet a lot again the pain is returned.  Aches at the end of the day. Hurts getting up out of a chair or after walking when she's been sitting awhile.  Hasn't tried anything for the pain.     She had a right knee xray in 2012 which showed moderate degenerative changes in the medial compartment and patellofemoral spurring.       Reviewed and updated as needed this visit by clinical staff  Tobacco  Allergies  Meds       Reviewed and updated as needed this visit by Provider         ROS:  msk reviewed,  otherwise negative unless noted above.       OBJECTIVE:     /64 (BP Location: Left arm, Patient Position: Chair, Cuff Size: Adult Large)  Pulse 84  Temp 97.4  F (36.3  C) (Tympanic)  Wt 219 lb (99.3 kg)  SpO2 96%  BMI 32.34 kg/m2  Body mass index is 32.34 kg/(m^2).    Gen: pleasant, well appearing woman, no distress  MSK: right knee without redness or deformity, moderate swelling         ASSESSMENT/PLAN:       1. Arthritis of right knee  Symptoms sound consistent with arthritis, and even 6 years ago on xray had evidence of arthritis.  Reviewed spectrum of treatments including OTC supplements,  analgesia, knee brace, PT, injections, and surgery.  She will start with OTC meds, ice, knee brace at work.  Hold on PT for now, but will plan to start when her schedule calms down a little.    - [facility] PT, HAND, AND CHIROPRACTIC REFERRAL    F/U as needed for persistent or worsening symptoms.       Elsy Aldrich MD  [facility]

## 2018-08-01 NOTE — PATIENT INSTRUCTIONS
You can try tumeric or chondroitin supplements.      Tylenol and ibuprofen are over the counter pain options.      Try a knee brace.      You can use heat and/or ice.

## 2018-09-18 DIAGNOSIS — F43.0 ACUTE REACTION TO STRESS: ICD-10-CM

## 2018-09-18 NOTE — TELEPHONE ENCOUNTER
"Requested Prescriptions   Pending Prescriptions Disp Refills     venlafaxine (EFFEXOR-XR) 37.5 MG 24 hr capsule [Pharmacy Med Name: VENLAFAXINE ER 37.5MG CAPSULES]  Last Written Prescription Date:  11/27/17  Last Fill Quantity: 90,  # refills: 1   Last office visit: 8/1/2018 with prescribing provider:  Elinor   Future Office Visit:     90 capsule 0     Sig: TAKE 1 CAPSULE(37.5 MG) BY MOUTH DAILY    Serotonin-Norepinephrine Reuptake Inhibitors  Passed    9/18/2018  9:22 AM       Passed - Blood pressure under 140/90 in past 12 months    BP Readings from Last 3 Encounters:   08/01/18 110/64   04/30/18 113/61   11/27/17 115/71                Passed - Recent (12 mo) or future (30 days) visit within the authorizing provider's specialty    Patient had office visit in the last 12 months or has a visit in the next 30 days with authorizing provider or within the authorizing provider's specialty.  See \"Patient Info\" tab in inbasket, or \"Choose Columns\" in Meds & Orders section of the refill encounter.           Passed - Patient is age 18 or older       Passed - No active pregnancy on record       Passed - Normal serum creatinine on file in past 12 months    Recent Labs   Lab Test 04/02/18   CR  0.73            Passed - No positive pregnancy test in past 12 months          "

## 2018-09-19 RX ORDER — VENLAFAXINE HYDROCHLORIDE 37.5 MG/1
CAPSULE, EXTENDED RELEASE ORAL
Qty: 90 CAPSULE | Refills: 0 | Status: SHIPPED | OUTPATIENT
Start: 2018-09-19 | End: 2019-01-07

## 2018-09-19 NOTE — TELEPHONE ENCOUNTER
Prescription approved per [facility] Refill Protocol.    Sherlyn Jay RN  EP Triage

## 2018-09-21 DIAGNOSIS — E78.5 HYPERLIPIDEMIA LDL GOAL <130: ICD-10-CM

## 2018-09-21 RX ORDER — SIMVASTATIN 40 MG
TABLET ORAL
Qty: 90 TABLET | Refills: 0 | Status: SHIPPED | OUTPATIENT
Start: 2018-09-21 | End: 2019-01-07

## 2018-09-21 NOTE — TELEPHONE ENCOUNTER
"Requested Prescriptions   Pending Prescriptions Disp Refills     simvastatin (ZOCOR) 40 MG tablet [Pharmacy Med Name: SIMVASTATIN 40MG TABLETS] 90 tablet 0     Sig: TAKE 1 TABLET(40 MG) BY MOUTH DAILY  Last Written Prescription Date:  11/27/17  Last Fill Quantity: 90,  # refills: 3   Last office visit: 8/1/2018 with prescribing provider:  Elinor   Future Office Visit:        [facility] Protocol Passed    9/21/2018 10:19 AM       Passed - LDL on file in past 12 months    Recent Labs   Lab Test  11/27/17   0940   LDL  97            Passed - No abnormal creatine kinase in past 12 months    No lab results found.            Passed - Recent (12 mo) or future (30 days) visit within the authorizing provider's specialty    Patient had office visit in the last 12 months or has a visit in the next 30 days with authorizing provider or within the authorizing provider's specialty.  See \"Patient Info\" tab in inbasket, or \"Choose Columns\" in Meds & Orders section of the refill encounter.           Passed - Patient is age 18 or older       Passed - No active pregnancy on record       Passed - No positive pregnancy test in past 12 months        Prescription approved per [facility] Refill Protocol.    Sherlyn Jay RN  EP Triage    "

## 2019-01-05 ASSESSMENT — ACTIVITIES OF DAILY LIVING (ADL): CURRENT_FUNCTION: NO ASSISTANCE NEEDED

## 2019-01-07 ENCOUNTER — OFFICE VISIT (OUTPATIENT)
Dept: FAMILY MEDICINE | Facility: CLINIC | Age: 69
End: 2019-01-07
Payer: COMMERCIAL

## 2019-01-07 VITALS
TEMPERATURE: 97.2 F | SYSTOLIC BLOOD PRESSURE: 104 MMHG | DIASTOLIC BLOOD PRESSURE: 72 MMHG | BODY MASS INDEX: 28.88 KG/M2 | WEIGHT: 195 LBS | HEART RATE: 76 BPM | HEIGHT: 69 IN

## 2019-01-07 DIAGNOSIS — E78.5 HYPERLIPIDEMIA LDL GOAL <130: ICD-10-CM

## 2019-01-07 DIAGNOSIS — K52.839 MICROSCOPIC COLITIS, UNSPECIFIED MICROSCOPIC COLITIS TYPE: ICD-10-CM

## 2019-01-07 DIAGNOSIS — Z00.00 ENCOUNTER FOR ROUTINE ADULT HEALTH EXAMINATION WITHOUT ABNORMAL FINDINGS: ICD-10-CM

## 2019-01-07 DIAGNOSIS — Z23 NEED FOR VACCINATION: ICD-10-CM

## 2019-01-07 DIAGNOSIS — F32.A DEPRESSION, UNSPECIFIED DEPRESSION TYPE: ICD-10-CM

## 2019-01-07 DIAGNOSIS — R73.01 IMPAIRED FASTING GLUCOSE: Primary | ICD-10-CM

## 2019-01-07 DIAGNOSIS — Z12.31 ENCOUNTER FOR SCREENING MAMMOGRAM FOR BREAST CANCER: ICD-10-CM

## 2019-01-07 DIAGNOSIS — F43.0 ACUTE REACTION TO STRESS: ICD-10-CM

## 2019-01-07 LAB
ALBUMIN SERPL-MCNC: 3.7 G/DL (ref 3.4–5)
ALP SERPL-CCNC: 68 U/L (ref 40–150)
ALT SERPL W P-5'-P-CCNC: 24 U/L (ref 0–50)
ANION GAP SERPL CALCULATED.3IONS-SCNC: 3 MMOL/L (ref 3–14)
AST SERPL W P-5'-P-CCNC: 30 U/L (ref 0–45)
BILIRUB SERPL-MCNC: 0.5 MG/DL (ref 0.2–1.3)
BUN SERPL-MCNC: 15 MG/DL (ref 7–30)
CALCIUM SERPL-MCNC: 9.4 MG/DL (ref 8.5–10.1)
CHLORIDE SERPL-SCNC: 107 MMOL/L (ref 94–109)
CHOLEST SERPL-MCNC: 202 MG/DL
CO2 SERPL-SCNC: 31 MMOL/L (ref 20–32)
CREAT SERPL-MCNC: 0.66 MG/DL (ref 0.52–1.04)
GFR SERPL CREATININE-BSD FRML MDRD: 89 ML/MIN/{1.73_M2}
GLUCOSE SERPL-MCNC: 104 MG/DL (ref 70–99)
HDLC SERPL-MCNC: 70 MG/DL
LDLC SERPL CALC-MCNC: 111 MG/DL
NONHDLC SERPL-MCNC: 132 MG/DL
POTASSIUM SERPL-SCNC: 4.4 MMOL/L (ref 3.4–5.3)
PROT SERPL-MCNC: 7.3 G/DL (ref 6.8–8.8)
SODIUM SERPL-SCNC: 141 MMOL/L (ref 133–144)
TRIGL SERPL-MCNC: 104 MG/DL

## 2019-01-07 PROCEDURE — 80053 COMPREHEN METABOLIC PANEL: CPT | Performed by: PHYSICIAN ASSISTANT

## 2019-01-07 PROCEDURE — 99397 PER PM REEVAL EST PAT 65+ YR: CPT | Mod: 25 | Performed by: PHYSICIAN ASSISTANT

## 2019-01-07 PROCEDURE — 36415 COLL VENOUS BLD VENIPUNCTURE: CPT | Performed by: PHYSICIAN ASSISTANT

## 2019-01-07 PROCEDURE — 90750 HZV VACC RECOMBINANT IM: CPT | Performed by: PHYSICIAN ASSISTANT

## 2019-01-07 PROCEDURE — 80061 LIPID PANEL: CPT | Performed by: PHYSICIAN ASSISTANT

## 2019-01-07 PROCEDURE — 90471 IMMUNIZATION ADMIN: CPT | Performed by: PHYSICIAN ASSISTANT

## 2019-01-07 RX ORDER — VENLAFAXINE HYDROCHLORIDE 37.5 MG/1
CAPSULE, EXTENDED RELEASE ORAL
Qty: 90 CAPSULE | Refills: 1 | Status: SHIPPED | OUTPATIENT
Start: 2019-01-07 | End: 2019-07-04

## 2019-01-07 RX ORDER — SIMVASTATIN 40 MG
TABLET ORAL
Qty: 90 TABLET | Refills: 3 | Status: SHIPPED | OUTPATIENT
Start: 2019-01-07 | End: 2020-01-09

## 2019-01-07 ASSESSMENT — ANXIETY QUESTIONNAIRES
IF YOU CHECKED OFF ANY PROBLEMS ON THIS QUESTIONNAIRE, HOW DIFFICULT HAVE THESE PROBLEMS MADE IT FOR YOU TO DO YOUR WORK, TAKE CARE OF THINGS AT HOME, OR GET ALONG WITH OTHER PEOPLE: NOT DIFFICULT AT ALL
6. BECOMING EASILY ANNOYED OR IRRITABLE: NOT AT ALL
GAD7 TOTAL SCORE: 1
2. NOT BEING ABLE TO STOP OR CONTROL WORRYING: NOT AT ALL
1. FEELING NERVOUS, ANXIOUS, OR ON EDGE: SEVERAL DAYS
5. BEING SO RESTLESS THAT IT IS HARD TO SIT STILL: NOT AT ALL
7. FEELING AFRAID AS IF SOMETHING AWFUL MIGHT HAPPEN: NOT AT ALL
3. WORRYING TOO MUCH ABOUT DIFFERENT THINGS: NOT AT ALL

## 2019-01-07 ASSESSMENT — MIFFLIN-ST. JEOR: SCORE: 1468.89

## 2019-01-07 ASSESSMENT — PATIENT HEALTH QUESTIONNAIRE - PHQ9
5. POOR APPETITE OR OVEREATING: NOT AT ALL
SUM OF ALL RESPONSES TO PHQ QUESTIONS 1-9: 2

## 2019-01-07 ASSESSMENT — ACTIVITIES OF DAILY LIVING (ADL): CURRENT_FUNCTION: NO ASSISTANCE NEEDED

## 2019-01-07 NOTE — NURSING NOTE
"Chief Complaint   Patient presents with     Wellness Visit       Initial /72   Pulse 76   Temp 97.2  F (36.2  C) (Tympanic)   Ht 1.753 m (5' 9\")   Wt 88.5 kg (195 lb)   Breastfeeding? No   BMI 28.80 kg/m   Estimated body mass index is 28.8 kg/m  as calculated from the following:    Height as of this encounter: 1.753 m (5' 9\").    Weight as of this encounter: 88.5 kg (195 lb).  BP completed using cuff size: regular    Health Maintenance Due   Topic Date Due     DTAP/TDAP/TD IMMUNIZATION (1 - Tdap) 11/13/1973     ZOSTER IMMUNIZATION (2 of 3) 01/15/2013     INFLUENZA VACCINE (1) 09/01/2018     PHQ-9 Q6 MONTHS  10/30/2018     FALL RISK ASSESSMENT  11/27/2018     WELLNESS VISIT Q1 YR  11/27/2018         Demetra Spencer MA 1/07/19        "

## 2019-01-07 NOTE — PATIENT INSTRUCTIONS
Preventive Health Recommendations    See your health care provider every year to    Review health changes.     Discuss preventive care.      Review your medicines if your doctor has prescribed any.      You no longer need a yearly Pap test unless you've had an abnormal Pap test in the past 10 years. If you have vaginal symptoms, such as bleeding or discharge, be sure to talk with your provider about a Pap test.      Every 1 to 2 years, have a mammogram.  If you are over 69, talk with your health care provider about whether or not you want to continue having screening mammograms.      Every 10 years, have a colonoscopy. Or, have a yearly FIT test (stool test). These exams will check for colon cancer.       Have a cholesterol test every 5 years, or more often if your doctor advises it.       Have a diabetes test (fasting glucose) every three years. If you are at risk for diabetes, you should have this test more often.       At age 65, have a bone density scan (DEXA) to check for osteoporosis (brittle bone disease).    Shots:    Get a flu shot each year.    Get a tetanus shot every 10 years.    Talk to your doctor about your pneumonia vaccines. There are now two you should receive - Pneumovax (PPSV 23) and Prevnar (PCV 13).    Talk to your pharmacist about the shingles vaccine.    Talk to your doctor about the hepatitis B vaccine.    Nutrition:     Eat at least 5 servings of fruits and vegetables each day.      Eat whole-grain bread, whole-wheat pasta and brown rice instead of white grains and rice.      Get adequate Calcium and Vitamin D.     Lifestyle    Exercise at least 150 minutes a week (30 minutes a day, 5 days a week). This will help you control your weight and prevent disease.      Limit alcohol to one drink per day.      No smoking.       Wear sunscreen to prevent skin cancer.       See your dentist twice a year for an exam and cleaning.      See your eye doctor every 1 to 2 years to screen for conditions  such as glaucoma, macular degeneration and cataracts.    Personalized Prevention Plan  You are due for the preventive services outlined below.  Your care team is available to assist you in scheduling these services.  If you have already completed any of these items, please share that information with your care team to update in your medical record.  Health Maintenance Due   Topic Date Due     Diptheria Tetanus Pertussis (DTAP/TDAP/TD) Vaccine (1 - Tdap) 11/13/1973     Zoster (Chicken Pox) Vaccine (2 of 3) 01/15/2013     Flu Vaccine (1) 09/01/2018     Depression Assessment - every 6 months  10/30/2018     FALL RISK ASSESSMENT  11/27/2018     Wellness Visit with your Primary Provider - yearly  11/27/2018

## 2019-01-07 NOTE — PROGRESS NOTES
"SUBJECTIVE:   Francisca Ramírez is a 68 year old female who presents for Preventive Visit.      Are you in the first 12 months of your Medicare coverage?  No    Annual Wellness Visit     In general, how would you rate your overall health?  Good    Frequency of exercise:  4-5 days/week    Duration of exercise:  Greater than 60 minutes    Do you usually eat at least 4 servings of fruit and vegetables a day, include whole grains    & fiber and avoid regularly eating high fat or \"junk\" foods?  No    Taking medications regularly:  Yes    Medication side effects:  Not applicable    Ability to successfully perform activities of daily living:  No assistance needed    Home Safety:  No safety concerns identified    Hearing Impairment:  No hearing concerns    In the past 6 months, have you been bothered by leaking of urine? Yes    In general, how would you rate your overall mental or emotional health?  Good    PHQ-2 Total Score: 0    Additional concerns today:  Yes    Do you feel safe in your environment? Yes    Do you have a Health Care Directive? Yes: Advance Directive has been received and scanned.      Fall risk  Fallen 2 or more times in the past year?: No  Any fall with injury in the past year?: No    Cognitive Screening   1) Repeat 3 items (Leader, Season, Table)      2) Clock draw:   NORMAL  3) 3 item recall: Recalls 2 objects   Results: NORMAL clock, 1-2 items recalled: COGNITIVE IMPAIRMENT LESS LIKELY    Mini-CogTM Copyright ARISTEO Zarate. Licensed by the author for use in [facility]; reprinted with permission (kale@.[facility]). All rights reserved.      Do you have sleep apnea, excessive snoring or daytime drowsiness?: no    Reviewed and updated as needed this visit by clinical staff  Tobacco  Allergies  Meds  Med Hx  Surg Hx  Fam Hx  Soc Hx        Reviewed and updated as needed this visit by Provider  Tobacco  Med Hx  Surg Hx  Fam Hx  Soc Hx       Social History     Tobacco Use     Smoking status: " Never Smoker     Smokeless tobacco: Never Used   Substance Use Topics     Alcohol use: Yes     Comment: two or more on occassion       Alcohol Use 1/5/2019   If you drink alcohol do you typically have greater than 3 drinks per day OR greater than 7 drinks per week? No               Current providers sharing in care for this patient include:   Patient Care Team:  Antoni Oneill PA-C as PCP - General (Physician Assistant)  Antoni Oneill PA-C as PCP - Assigned PCP    The following health maintenance items are reviewed in Epic and correct as of today:  Health Maintenance   Topic Date Due     DTAP/TDAP/TD IMMUNIZATION (1 - Tdap) 11/13/1973     ZOSTER IMMUNIZATION (2 of 3) 01/15/2013     INFLUENZA VACCINE (1) 09/01/2018     PHQ-9 Q6 MONTHS  10/30/2018     FALL RISK ASSESSMENT  11/27/2018     WELLNESS VISIT Q1 YR  11/27/2018     MAMMO SCREEN Q2 YR (SYSTEM ASSIGNED)  04/03/2019     ADVANCE DIRECTIVE PLANNING Q5 YRS  11/21/2021     LIPID SCREEN Q5 YR FEMALE (SYSTEM ASSIGNED)  11/27/2022     COLON CANCER SCREEN (SYSTEM ASSIGNED)  09/01/2027     DEXA SCAN SCREENING (SYSTEM ASSIGNED)  Completed     DEPRESSION ACTION PLAN  Completed     HEPATITIS C SCREENING  Completed     IPV IMMUNIZATION  Aged Out     MENINGITIS IMMUNIZATION  Aged Out     Patient Active Problem List   Diagnosis     Acute reaction to stress     HYPERLIPIDEMIA LDL GOAL <130     Advanced directives, counseling/discussion     Impaired fasting glucose     Knee pain     Family history of colon cancer     Benign neoplasm of skin of trunk, except scrotum     Microscopic colitis, unspecified microscopic colitis type     Arthritis of right knee     Past Surgical History:   Procedure Laterality Date     cataracts       COLONOSCOPY N/A 12/4/2014    Procedure: COLONOSCOPY;  Surgeon: Todd Borrego MD;  Location:  GI     EYE SURGERY  2014    Cataract surgery on both eyes     GYN SURGERY  ?    d/c     SURGICAL HISTORY OF -       s/p D&C 25 years  ago     SURGICAL HISTORY OF -   2004    Colonoscopy       Social History     Tobacco Use     Smoking status: Never Smoker     Smokeless tobacco: Never Used   Substance Use Topics     Alcohol use: Yes     Comment: two or more on occassion     Family History   Problem Relation Age of Onset     Cancer - colorectal Mother         82 yo      Colon Cancer Mother              Cancer Father         Brain tumor, age 46         Current Outpatient Medications   Medication Sig Dispense Refill     budesonide (ENTOCORT EC) 3 MG EC capsule TK 3 CS PO QD FOR 2 WEEKS THEN 2 X 2 WEEKS THEN 1 QD FOR 2 TO 3 MONTHS  2     cholecalciferol (VITAMIN-D) 1000 UNIT capsule Take 1 capsule by mouth daily.       hyoscyamine (LEVBID) 0.375 MG 12 hr tablet TK 1 T PO BID  3     simvastatin (ZOCOR) 40 MG tablet TAKE 1 TABLET(40 MG) BY MOUTH DAILY 90 tablet 3     UNKNOWN MED DOSAGE Flexitol heel balm: apply to feet daily as needed 1 1     venlafaxine (EFFEXOR-XR) 37.5 MG 24 hr capsule TAKE 1 CAPSULE(37.5 MG) BY MOUTH DAILY 90 capsule 1     Allergies   Allergen Reactions     Vicodin [Hydrocodone-Acetaminophen]      Recent Labs   Lab Test 18  0940  16  1043 16  1140 11/19/15  0841 14  0919 13  1027 12  0943  11  0908   A1C  --   --   --   --   --  5.8  --  5.8 5.6  --    < >  --    LDL  --   --  97  --  104*  --  102* 93 95  --    < > 92   HDL  --   --  68  --  54  --  56 58 54  --    < > 59   TRIG  --   --  184*  --  157*  --  185* 121 184*  --    < > 125   ALT 15 18 22   < >  --   --  28 25 29  --    < >  --    CR 0.73 0.75 0.80   < > 0.74  --  0.78 0.68 0.81  --    < >  --    GFRESTIMATED 84 82 71   < > 78  --  73 87 71  --    < >  --    GFRESTBLACK 97 94 85   < > >90   GFR Calc    --  89 >90   GFR Calc   86  --    < >  --    POTASSIUM 4.9 4.6 4.0   < > 3.8  --  3.9 4.3 4.1  --    < >  --    TSH  --   --   --   --   --   --   --   --   --  1.37   "--  1.64    < > = values in this interval not displayed.      Mammogram Screening: Mammogram Screening: Patient over age 50, mutual decision to screen reflected in health maintenance.    Review of Systems  Constitutional, HEENT, cardiovascular, pulmonary, GI, , musculoskeletal, neuro, skin, endocrine and psych systems are negative, except as otherwise noted.    OBJECTIVE:   /72   Pulse 76   Temp 97.2  F (36.2  C) (Tympanic)   Ht 1.753 m (5' 9\")   Wt 88.5 kg (195 lb)   Breastfeeding? No   BMI 28.80 kg/m   Estimated body mass index is 28.8 kg/m  as calculated from the following:    Height as of this encounter: 1.753 m (5' 9\").    Weight as of this encounter: 88.5 kg (195 lb).  Physical Exam  GENERAL: healthy, alert and no distress  EYES: Eyes grossly normal to inspection, PERRL and conjunctivae and sclerae normal  HENT: ear canals and TM's normal, nose and mouth without ulcers or lesions  NECK: no adenopathy, no asymmetry, masses, or scars and thyroid normal to palpation  RESP: lungs clear to auscultation - no rales, rhonchi or wheezes  CV: regular rate and rhythm, normal S1 S2, no S3 or S4, no murmur, click or rub, no peripheral edema  ABDOMEN: soft, nontender, no hepatosplenomegaly, no masses and bowel sounds normal  MS: no gross musculoskeletal defects noted, no edema  SKIN: scattered seborrheic keratosis on skin of back  NEURO: Normal strength and tone, mentation intact and speech normal  PSYCH: mentation appears normal, affect normal/bright    Diagnostic Test Results:  none     ASSESSMENT / PLAN:   1. Encounter for routine adult health examination without abnormal findings    2. Impaired fasting glucose      3. Hyperlipidemia LDL goal <130  - simvastatin (ZOCOR) 40 MG tablet; TAKE 1 TABLET(40 MG) BY MOUTH DAILY  Dispense: 90 tablet; Refill: 3  - Lipid Profile (Chol, Trig, HDL, LDL calc)  - Comprehensive metabolic panel (BMP + Alb, Alk Phos, ALT, AST, Total. Bili, TP)    4. Acute reaction to " "stress  See PHQ today, depression and anxiety well controlled with Effexor.  Follow up in 6 months recommended  - venlafaxine (EFFEXOR-XR) 37.5 MG 24 hr capsule; TAKE 1 CAPSULE(37.5 MG) BY MOUTH DAILY  Dispense: 90 capsule; Refill: 1    5. Depression, unspecified depression type    6. Microscopic colitis, unspecified microscopic colitis type  Seeing Dr. Farr for this, symptoms waxing and waning but overall improved with budesonide      7. Encounter for screening mammogram for breast cancer  - *MA Screening Digital Bilateral; Future    8. Need for vaccination  - TDAP VACCINE (ADACEL) [89672.002]  - SHINGRIX [73523]  - 1st  Administration  [87103]  - Each additional admin.  (Right click and add QUANTITY)  [09491]    End of Life Planning:  Patient currently has an advanced directive: No.  I have verified the patient's ablity to prepare an advanced directive/make health care decisions.  Literature was provided to assist patient in preparing an advanced directive.    COUNSELING:  Reviewed preventive health counseling, as reflected in patient instructions       Regular exercise       Healthy diet/nutrition       Immunizations    Vaccinated for: TDAP and Zoster             Colon cancer screening    BP Readings from Last 1 Encounters:   01/07/19 104/72     Estimated body mass index is 28.8 kg/m  as calculated from the following:    Height as of this encounter: 1.753 m (5' 9\").    Weight as of this encounter: 88.5 kg (195 lb).      Weight management plan: Discussed healthy diet and exercise guidelines     reports that  has never smoked. she has never used smokeless tobacco.      Appropriate preventive services were discussed with this patient, including applicable screening as appropriate for cardiovascular disease, diabetes, osteopenia/osteoporosis, and glaucoma.  As appropriate for age/gender, discussed screening for colorectal cancer, prostate cancer, breast cancer, and cervical cancer. Checklist reviewing preventive " services available has been given to the patient.    Reviewed patients plan of care and provided an AVS. The Basic Care Plan (routine screening as documented in Health Maintenance) for Francisca meets the Care Plan requirement. This Care Plan has been established and reviewed with the Patient.    Counseling Resources:  ATP IV Guidelines  Pooled Cohorts Equation Calculator  Breast Cancer Risk Calculator  FRAX Risk Assessment  ICSI Preventive Guidelines  Dietary Guidelines for Americans, 2010  USDA's MyPlate  ASA Prophylaxis  Lung CA Screening    Antoni Oneill PA-C  [facility]

## 2019-01-09 ASSESSMENT — ANXIETY QUESTIONNAIRES: GAD7 TOTAL SCORE: 1

## 2019-01-11 ENCOUNTER — TRANSFERRED RECORDS (OUTPATIENT)
Dept: HEALTH INFORMATION MANAGEMENT | Facility: CLINIC | Age: 69
End: 2019-01-11

## 2019-01-11 NOTE — RESULT ENCOUNTER NOTE
Francisca-  Here are your recent results      -Cholesterol levels are at your goal levels.  ADVISE: continuing your medication, a regular exercise program with at least 150 minutes of aerobic exercise per week, and eating a low saturated fat/low carbohydrate diet.  Also, you should recheck this fasting cholesterol panel in 12 months.  -Glucose is slightly elevated and may be a sign of prediabetes ADVISE:: eating a low carbohydrate diet, exercising, trying to lose weight (if necessary) and rechecking your glucose level in 12 months.    If you have any questions please do not hesitate to contact our office via phone ([phone]) or you may send me a message via [employer] by clicking the contact my Care Team link.      It was a pleasure participating in your care!    Thank you,    Antoni Oneill MPH, PA-C  [address] [place] [address] [phone]

## 2019-01-14 ENCOUNTER — HOSPITAL ENCOUNTER (OUTPATIENT)
Dept: MAMMOGRAPHY | Facility: CLINIC | Age: 69
Discharge: HOME | End: 2019-01-14
Attending: PHYSICIAN ASSISTANT | Admitting: PHYSICIAN ASSISTANT
Payer: COMMERCIAL

## 2019-01-14 DIAGNOSIS — Z12.31 ENCOUNTER FOR SCREENING MAMMOGRAM FOR BREAST CANCER: ICD-10-CM

## 2019-01-14 PROCEDURE — 77067 SCR MAMMO BI INCL CAD: CPT

## 2019-03-25 ENCOUNTER — TELEPHONE (OUTPATIENT)
Dept: OTHER | Facility: CLINIC | Age: 69
End: 2019-03-25

## 2019-07-04 DIAGNOSIS — F43.0 ACUTE REACTION TO STRESS: ICD-10-CM

## 2019-07-05 RX ORDER — VENLAFAXINE HYDROCHLORIDE 37.5 MG/1
CAPSULE, EXTENDED RELEASE ORAL
Qty: 90 CAPSULE | Refills: 1 | Status: SHIPPED | OUTPATIENT
Start: 2019-07-05 | End: 2020-01-09

## 2019-07-05 NOTE — TELEPHONE ENCOUNTER
"Requested Prescriptions   Pending Prescriptions Disp Refills     venlafaxine (EFFEXOR-XR) 37.5 MG 24 hr capsule [Pharmacy Med Name: VENLAFAXINE HCL ER 37.5 MG CAP] 90 capsule 0     Sig: TAKE 1 CAPSULE BY MOUTH EVERY DAY       Serotonin-Norepinephrine Reuptake Inhibitors  Passed - 7/4/2019  1:47 AM        Passed - Blood pressure under 140/90 in past 12 months     BP Readings from Last 3 Encounters:   01/07/19 104/72   08/01/18 110/64   04/30/18 113/61                 Passed - Recent (12 mo) or future (30 days) visit within the authorizing provider's specialty     Patient had office visit in the last 12 months or has a visit in the next 30 days with authorizing provider or within the authorizing provider's specialty.  See \"Patient Info\" tab in inbasket, or \"Choose Columns\" in Meds & Orders section of the refill encounter.              Passed - Medication is active on med list        Passed - Patient is age 18 or older        Passed - No active pregnancy on record        Passed - Normal serum creatinine on file in past 12 months     Recent Labs   Lab Test 01/07/19  0932   CR 0.66             Passed - No positive pregnancy test in past 12 months        Last Written Prescription Date:  1/7/2019  Last Fill Quantity: 90,  # refills: 1   Last office visit: 1/7/2019 with prescribing provider:  1/7/2019   Future Office Visit:    PHQ-9 SCORE 11/27/2017 4/30/2018 1/7/2019   PHQ-9 Total Score - - -   PHQ-9 Total Score 1 9 2       "

## 2019-10-02 ENCOUNTER — HEALTH MAINTENANCE LETTER (OUTPATIENT)
Age: 69
End: 2019-10-02

## 2019-10-10 ENCOUNTER — TRANSFERRED RECORDS (OUTPATIENT)
Dept: HEALTH INFORMATION MANAGEMENT | Facility: CLINIC | Age: 69
End: 2019-10-10

## 2019-10-16 ENCOUNTER — TRANSFERRED RECORDS (OUTPATIENT)
Dept: HEALTH INFORMATION MANAGEMENT | Facility: CLINIC | Age: 69
End: 2019-10-16

## 2019-12-30 ENCOUNTER — VIRTUAL VISIT (OUTPATIENT)
Dept: FAMILY MEDICINE | Facility: OTHER | Age: 69
End: 2019-12-30

## 2019-12-30 ENCOUNTER — TELEPHONE (OUTPATIENT)
Dept: FAMILY MEDICINE | Facility: CLINIC | Age: 69
End: 2019-12-30

## 2019-12-30 ENCOUNTER — MYC MEDICAL ADVICE (OUTPATIENT)
Dept: FAMILY MEDICINE | Facility: CLINIC | Age: 69
End: 2019-12-30

## 2019-12-30 DIAGNOSIS — J01.90 ACUTE SINUSITIS WITH SYMPTOMS > 10 DAYS: Primary | ICD-10-CM

## 2019-12-30 RX ORDER — AMOXICILLIN 500 MG/1
500 CAPSULE ORAL 3 TIMES DAILY
Qty: 30 CAPSULE | Refills: 0 | Status: SHIPPED | OUTPATIENT
Start: 2019-12-30 | End: 2020-01-09

## 2019-12-30 RX ORDER — BENZONATATE 100 MG/1
100 CAPSULE ORAL 3 TIMES DAILY PRN
Qty: 30 CAPSULE | Refills: 0 | Status: SHIPPED | OUTPATIENT
Start: 2019-12-30 | End: 2021-01-12

## 2019-12-30 NOTE — TELEPHONE ENCOUNTER
Marisela mtz Francisca went to [facility] for extremely sore throat and the Dr said she had a virus- pharyngitis. No strep throat. Gave her tiny pills-  for throat pain.     Now she has green nasal discharge and cough productive of green mucus.   No headache.  Tired.   No fever.   Poor appetite. She is drinking adequate amounts.   No pain with cough.Not SOB   Sore throat gone.  No headache or tooth pain.    We don't have OV available. 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 I also pended her pharmacy.    Shannon Burris RN- Triage [employer]

## 2019-12-30 NOTE — TELEPHONE ENCOUNTER
Reason for Call:  Other call back    Detailed comments: Pt seen at [address] 25[address],  , Pt now states is having green draingage    Phone Number Patient can be reached at: Home number on file [phone] (home)    Best Time: anytime    Can we leave a detailed message on this number? YES    Call taken on 12/30/2019 at 7:47 AM by Feli Chase

## 2019-12-30 NOTE — TELEPHONE ENCOUNTER
Patient calling to ask us to send the Rx for amoxicillin to [facility] in [place] Burris RN- Triage [employer]

## 2019-12-30 NOTE — PROGRESS NOTES
"Date: 2019 11:05:32  Clinician: Arpit Merritt  Clinician NPI: [id number]  Patient: dwaine gómez  Patient : 1950  Patient Address: [address] [place] [address] [place] [address]  Patient Phone: [phone]  Visit Protocol: URI  Patient Summary:  dwaine is a 69 year old ( : 1950 ) female who initiated a Visit for cold, sinus infection, or influenza. When asked the question \"Please sign me up to receive news, health information and promotions from [employer].\", dwaine responded \"Yes\".    dwaine states her symptoms started gradually 10-13 days ago.   Her symptoms consist of nasal congestion, malaise, and a cough. dwaine also feels feverish but was unable to measure her temperature.   Symptom details     Nasal secretions: The color of her mucus is green.    Cough: dwaine coughs a few times an hour and her cough is not more bothersome at night. Phlegm comes into her throat when she coughs. She believes the phlegm causes the cough. The color of the phlegm is green.      dwaine denies having headache, sore throat, ear pain, enlarged lymph nodes, chills, rhinitis, wheezing, teeth pain, facial pain or pressure, and myalgias. She also denies double sickening (worsening symptoms after initial improvement), taking antibiotic medication for the symptoms, having recent facial or sinus surgery in the past 60 days, and having a sinus infection within the past year. She is not experiencing dyspnea.   Precipitating events  She has not recently been exposed to someone with influenza. dwaine has not been in close contact with any high risk individuals.   Pertinent medical history  dwaine does not get yeast infections when she takes antibiotics.   Weight: 184 lbs   dwaine does not smoke or use smokeless tobacco.   Weight: 184 lbs    MEDICATIONS: budesonide oral, venlafaxine oral, hyoscyamine sulfate oral, simvastatin oral, ALLERGIES: Vicodin  Clinician Response:  Dear dwaine,  Based on the information provided, you have acute " bacterial sinusitis, also known as a sinus infection. Sinus infections are caused by bacteria or a virus and symptoms are almost always identical. The difference between the 2 types of infections is timing.  Sinus infections start as viral infections and symptoms improve on their own in about 7 days. If symptoms have not improved after 7 days or have even worsened, a bacterial infection may have developed.  Medication information  I am prescribing:       Amoxicillin 500 mg oral tablet. Take 1 tablet by mouth every 8 hours for 10 days. There are no refills with this prescription.      Benzonatate (Tessalon Perles) 100 mg oral capsule. Take 1-2 capsules by mouth 3 times per day as needed for your cough. There are no refills with this prescription.     Yeast infections can be a common side effect of antibiotics. The most common symptom of a yeast infection is itchiness in and around the vagina. Other signs and symptoms include burning, redness, or a thick, white vaginal discharge that looks like cottage cheese and does not have a bad smell.  Self care  The following tips will keep you as comfortable as possible while you recover:     Rest    Drink plenty of water and other liquids    Take a hot shower to loosen congestion    Take a spoonful of honey to reduce your cough     When to seek care  Please be seen in a clinic or urgent care if any of the following occur:     Symptoms do not start to improve after 3 days of treatment    New symptoms develop, or symptoms become worse     It is possible to have an allergic reaction to an antibiotic even if you have not had one in the past. If you notice a new rash, significant swelling, or difficulty breathing, stop taking this medication immediately and go to a clinic or urgent care.   Diagnosis: Acute bacterial sinusitis  Diagnosis ICD: J01.90  Prescription: benzonatate (Tessalon Perles) 100 mg oral capsule 30 capsule, 5 days supply. Take 1-2 capsules by mouth 3 times per day  as needed. Refills: 0, Refill as needed: no, Allow substitutions: yes  Prescription: amoxicillin 500 mg oral tablet 30 tablet, 10 days supply. Take 1 tablet by mouth every 8 hours for 10 days. Refills: 0, Refill as needed: no, Allow substitutions: yes

## 2019-12-30 NOTE — TELEPHONE ENCOUNTER
There is a virtual visit in the chart from today but I am not seeing the medications called in.      Diagnosis: Acute bacterial sinusitis  Diagnosis ICD: J01.90  Prescription: benzonatate (Tessalon Perles) 100 mg oral capsule 30 capsule, 5 days supply. Take 1-2 capsules by mouth 3 times per day as needed. Refills: 0, Refill as needed: no, Allow substitutions: yes  Prescription: amoxicillin 500 mg oral tablet 30 tablet, 10 days supply. Take 1 tablet by mouth every 8 hours for 10 days. Refills: 0, Refill as needed: no, Allow substitutions: yes  Carolyn JOSHUARN BSN  [facility]  [phone]

## 2019-12-31 NOTE — TELEPHONE ENCOUNTER
I am sending her medications.  I am a little unclear of why these were not sent in, looks like the evglenisit was not with our clinic. Please let her know that these should be at the pharmacy

## 2020-01-09 ENCOUNTER — OFFICE VISIT (OUTPATIENT)
Dept: FAMILY MEDICINE | Facility: CLINIC | Age: 70
End: 2020-01-09
Payer: COMMERCIAL

## 2020-01-09 VITALS
SYSTOLIC BLOOD PRESSURE: 110 MMHG | TEMPERATURE: 98.1 F | OXYGEN SATURATION: 96 % | HEIGHT: 70 IN | DIASTOLIC BLOOD PRESSURE: 60 MMHG | HEART RATE: 62 BPM | BODY MASS INDEX: 26.48 KG/M2 | WEIGHT: 185 LBS

## 2020-01-09 DIAGNOSIS — E78.5 HYPERLIPIDEMIA LDL GOAL <130: ICD-10-CM

## 2020-01-09 DIAGNOSIS — K52.839 MICROSCOPIC COLITIS, UNSPECIFIED MICROSCOPIC COLITIS TYPE: ICD-10-CM

## 2020-01-09 DIAGNOSIS — Z23 NEED FOR VACCINATION: ICD-10-CM

## 2020-01-09 DIAGNOSIS — F43.0 ACUTE REACTION TO STRESS: ICD-10-CM

## 2020-01-09 DIAGNOSIS — Z00.00 ENCOUNTER FOR MEDICARE ANNUAL WELLNESS EXAM: Primary | ICD-10-CM

## 2020-01-09 LAB
ALBUMIN SERPL-MCNC: 2.9 G/DL (ref 3.4–5)
ALP SERPL-CCNC: 75 U/L (ref 40–150)
ALT SERPL W P-5'-P-CCNC: 26 U/L (ref 0–50)
ANION GAP SERPL CALCULATED.3IONS-SCNC: 5 MMOL/L (ref 3–14)
AST SERPL W P-5'-P-CCNC: 21 U/L (ref 0–45)
BILIRUB SERPL-MCNC: 0.5 MG/DL (ref 0.2–1.3)
BUN SERPL-MCNC: 11 MG/DL (ref 7–30)
CALCIUM SERPL-MCNC: 8.9 MG/DL (ref 8.5–10.1)
CHLORIDE SERPL-SCNC: 108 MMOL/L (ref 94–109)
CHOLEST SERPL-MCNC: 150 MG/DL
CO2 SERPL-SCNC: 29 MMOL/L (ref 20–32)
CREAT SERPL-MCNC: 0.59 MG/DL (ref 0.52–1.04)
GFR SERPL CREATININE-BSD FRML MDRD: >90 ML/MIN/{1.73_M2}
GLUCOSE SERPL-MCNC: 91 MG/DL (ref 70–99)
HDLC SERPL-MCNC: 55 MG/DL
LDLC SERPL CALC-MCNC: 79 MG/DL
NONHDLC SERPL-MCNC: 95 MG/DL
POTASSIUM SERPL-SCNC: 3.9 MMOL/L (ref 3.4–5.3)
PROT SERPL-MCNC: 6.8 G/DL (ref 6.8–8.8)
SODIUM SERPL-SCNC: 142 MMOL/L (ref 133–144)
TRIGL SERPL-MCNC: 78 MG/DL

## 2020-01-09 PROCEDURE — 90750 HZV VACC RECOMBINANT IM: CPT | Performed by: PHYSICIAN ASSISTANT

## 2020-01-09 PROCEDURE — 80053 COMPREHEN METABOLIC PANEL: CPT | Performed by: PHYSICIAN ASSISTANT

## 2020-01-09 PROCEDURE — 36415 COLL VENOUS BLD VENIPUNCTURE: CPT | Performed by: PHYSICIAN ASSISTANT

## 2020-01-09 PROCEDURE — 90471 IMMUNIZATION ADMIN: CPT | Performed by: PHYSICIAN ASSISTANT

## 2020-01-09 PROCEDURE — 80061 LIPID PANEL: CPT | Performed by: PHYSICIAN ASSISTANT

## 2020-01-09 PROCEDURE — 99397 PER PM REEVAL EST PAT 65+ YR: CPT | Mod: 25 | Performed by: PHYSICIAN ASSISTANT

## 2020-01-09 RX ORDER — VENLAFAXINE HYDROCHLORIDE 37.5 MG/1
CAPSULE, EXTENDED RELEASE ORAL
Qty: 90 CAPSULE | Refills: 1 | Status: SHIPPED | OUTPATIENT
Start: 2020-01-09 | End: 2020-07-31

## 2020-01-09 RX ORDER — SIMVASTATIN 40 MG
TABLET ORAL
Qty: 90 TABLET | Refills: 3 | Status: SHIPPED | OUTPATIENT
Start: 2020-01-09 | End: 2021-01-11

## 2020-01-09 ASSESSMENT — MIFFLIN-ST. JEOR: SCORE: 1434.4

## 2020-01-09 NOTE — PROGRESS NOTES
"  SUBJECTIVE:   Francisca Ramírez is a 69 year old adult who presents for Preventive Visit.      Are you in the first 12 months of your Medicare Part B coverage?  No    Physical Health:    In general, how would you rate your overall physical health? excellent    Outside of work, how many days during the week do you exercise? Walking at work     Outside of work, approximately how many minutes a day do you exercise?not applicable    If you drink alcohol do you typically have >3 drinks per day or >7 drinks per week? No    Do you usually eat at least 4 servings of fruit and vegetables a day, include whole grains & fiber and avoid regularly eating high fat or \"junk\" foods? Yes    Do you have any problems taking medications regularly?  No    Do you have any side effects from medications? not applicable    Needs assistance for the following daily activities: no assistance needed    Which of the following safety concerns are present in your home?  none identified     Hearing impairment: No    In the past 6 months, have you been bothered by leaking of urine? no    Mental Health:    In general, how would you rate your overall mental or emotional health? excellent  PHQ-2 Score:      Do you feel safe in your environment? Yes    Have you ever done Advance Care Planning? (For example, a Health Directive, POLST, or a discussion with a medical provider or your loved ones about your wishes): Yes, patient states has an Advance Care Planning document and will bring a copy to the clinic.    Additional concerns to address?  No    Fall risk:  Fallen 2 or more times in the past year?: No  Any fall with injury in the past year?: No    Cognitive Screenin) Repeat 3 items (Leader, Season, Table)    2) Clock draw: Normal  3) 3 item recall: Recalls 1 object   Results: NORMAL clock, 1-2 items recalled: COGNITIVE IMPAIRMENT LESS LIKELY    Mini-CogTM Copyright S [employer]. Licensed by the author for use in [facility]; reprinted " with permission (kale@[facility]). All rights reserved.      Do you have sleep apnea, excessive snoring or daytime drowsiness?: no            Reviewed and updated as needed this visit by clinical staff  Tobacco         Reviewed and updated as needed this visit by Provider        Social History     Tobacco Use     Smoking status: Never Smoker     Smokeless tobacco: Never Used   Substance Use Topics     Alcohol use: Yes     Comment: two or more on occassion                           Current providers sharing in care for this patient include:   Patient Care Team:  Antoni Oneill PA-C as PCP - General (Physician Assistant)  Antoni Oneill PA-C as Assigned PCP    The following health maintenance items are reviewed in Epic and correct as of today:  Health Maintenance   Topic Date Due     FALL RISK ASSESSMENT  01/07/2020     ZOSTER IMMUNIZATION (3 of 3) 03/04/2019     MEDICARE ANNUAL WELLNESS VISIT  01/07/2020     MAMMO SCREENING  01/14/2021     ADVANCE CARE PLANNING  11/21/2021     LIPID  01/07/2024     COLONOSCOPY  10/16/2024     DTAP/TDAP/TD IMMUNIZATION (3 - Td) 01/07/2029     DEXA  Completed     HEPATITIS C SCREENING  Completed     DEPRESSION ACTION PLAN  Completed     INFLUENZA VACCINE  Completed     PNEUMOCOCCAL IMMUNIZATION 65+ LOW/MEDIUM RISK  Completed     IPV IMMUNIZATION  Aged Out     MENINGITIS IMMUNIZATION  Aged Out     Patient Active Problem List   Diagnosis     Acute reaction to stress     HYPERLIPIDEMIA LDL GOAL <130     Advanced directives, counseling/discussion     Impaired fasting glucose     Knee pain     Family history of colon cancer     Benign neoplasm of skin of trunk, except scrotum     Microscopic colitis, unspecified microscopic colitis type     Arthritis of right knee     Past Surgical History:   Procedure Laterality Date     cataracts       COLONOSCOPY N/A 12/4/2014    Procedure: COLONOSCOPY;  Surgeon: Todd Borrego MD;  Location:  GI     EYE SURGERY  2014     Cataract surgery on both eyes     GYN SURGERY  ?    d/c     SURGICAL HISTORY OF -       s/p D&C 25 years ago     SURGICAL HISTORY OF -   2004    Colonoscopy       Social History     Tobacco Use     Smoking status: Never Smoker     Smokeless tobacco: Never Used   Substance Use Topics     Alcohol use: Yes     Comment: two or more on occassion     Family History   Problem Relation Age of Onset     Cancer - colorectal Mother         82 yo      Colon Cancer Mother              Cancer Father         Brain tumor, age 46         Current Outpatient Medications   Medication Sig Dispense Refill     amoxicillin (AMOXIL) 500 MG capsule Take 1 capsule (500 mg) by mouth 3 times daily for 10 days 30 capsule 0     benzonatate (TESSALON) 100 MG capsule Take 1 capsule (100 mg) by mouth 3 times daily as needed 30 capsule 0     budesonide (ENTOCORT EC) 3 MG EC capsule TK 3 CS PO QD FOR 2 WEEKS THEN 2 X 2 WEEKS THEN 1 QD FOR 2 TO 3 MONTHS  2     cholecalciferol (VITAMIN-D) 1000 UNIT capsule Take 1 capsule by mouth daily.       hyoscyamine (LEVBID) 0.375 MG 12 hr tablet TK 1 T PO BID  3     simvastatin (ZOCOR) 40 MG tablet TAKE 1 TABLET(40 MG) BY MOUTH DAILY 90 tablet 3     UNKNOWN MED DOSAGE Flexitol heel balm: apply to feet daily as needed 1 1     venlafaxine (EFFEXOR-XR) 37.5 MG 24 hr capsule TAKE 1 CAPSULE BY MOUTH EVERY DAY 90 capsule 1     Allergies   Allergen Reactions     Vicodin [Hydrocodone-Acetaminophen]      Recent Labs   Lab Test 19  0932 18  0940  16  1043 16  1140  14  0919 13  1027 12  0943  11  0908   A1C  --   --   --   --   --   --  5.8  --  5.8 5.6  --    < >  --    *  --   --  97  --  104*  --    < > 93 95  --    < > 92   HDL 70  --   --  68  --  54  --    < > 58 54  --    < > 59   TRIG 104  --   --  184*  --  157*  --    < > 121 184*  --    < > 125   ALT 24 15 18 22   < >  --   --    < > 25 29  --    < >  --    CR 0.66 0.73 0.75  "0.80   < > 0.74  --    < > 0.68 0.81  --    < >  --    GFRESTIMATED 89 84 82 71   < > 78  --    < > 87 71  --    < >  --    GFRESTBLACK >90 97 94 85   < > >90   GFR Calc    --    < > >90   GFR Calc   86  --    < >  --    POTASSIUM 4.4 4.9 4.6 4.0   < > 3.8  --    < > 4.3 4.1  --    < >  --    TSH  --   --   --   --   --   --   --   --   --   --  1.37  --  1.64    < > = values in this interval not displayed.      Mammogram Screening: Mammogram Screening: Patient over age 50, mutual decision to screen reflected in health maintenance.    ROS:  Constitutional, HEENT, cardiovascular, pulmonary, GI, , musculoskeletal, neuro, skin, endocrine and psych systems are negative, except as otherwise noted.    OBJECTIVE:   /60   Pulse 62   Temp 98.1  F (36.7  C) (Oral)   Ht 1.778 m (5' 10\")   Wt 83.9 kg (185 lb)   SpO2 96%   BMI 26.54 kg/m   Estimated body mass index is 26.54 kg/m  as calculated from the following:    Height as of this encounter: 1.778 m (5' 10\").    Weight as of this encounter: 83.9 kg (185 lb).  EXAM:   GENERAL: healthy, alert and no distress  EYES: Eyes grossly normal to inspection, PERRL and conjunctivae and sclerae normal  HENT: ear canals and TM's normal, nose and mouth without ulcers or lesions  NECK: no adenopathy, no asymmetry, masses, or scars and thyroid normal to palpation  RESP: lungs clear to auscultation - no rales, rhonchi or wheezes  CV: regular rate and rhythm, normal S1 S2, no S3 or S4, no murmur, click or rub, no peripheral edema  ABDOMEN: soft, nontender, no hepatosplenomegaly, no masses and bowel sounds normal  MS: no gross musculoskeletal defects noted, no edema  SKIN: no suspicious lesions or rashes  NEURO: Normal strength and tone, mentation intact and speech normal  PSYCH: mentation appears normal, affect normal/bright    Diagnostic Test Results:  Labs reviewed in Epic    ASSESSMENT / PLAN:   1. Encounter for Medicare annual wellness exam  - " "Comprehensive metabolic panel (BMP + Alb, Alk Phos, ALT, AST, Total. Bili, TP)    2. Hyperlipidemia LDL goal <130  - simvastatin (ZOCOR) 40 MG tablet; TAKE 1 TABLET(40 MG) BY MOUTH DAILY  Dispense: 90 tablet; Refill: 3  - Lipid Profile (Chol, Trig, HDL, LDL calc)    3. Acute reaction to stress  Symptoms controlled, refills sent to pharmacy  - venlafaxine (EFFEXOR-XR) 37.5 MG 24 hr capsule; TAKE 1 CAPSULE BY MOUTH EVERY DAY  Dispense: 90 capsule; Refill: 1    4. Microscopic colitis, unspecified microscopic colitis type  Continue Budesonide.  Following with Dr. Farr      COUNSELING:  Reviewed preventive health counseling, as reflected in patient instructions       Regular exercise       Healthy diet/nutrition    Estimated body mass index is 26.54 kg/m  as calculated from the following:    Height as of this encounter: 1.778 m (5' 10\").    Weight as of this encounter: 83.9 kg (185 lb).         reports that Francisca Ramírez has never smoked. Francisca Ramírez has never used smokeless tobacco.      Appropriate preventive services were discussed with this patient, including applicable screening as appropriate for cardiovascular disease, diabetes, osteopenia/osteoporosis, and glaucoma.  As appropriate for age/gender, discussed screening for colorectal cancer, prostate cancer, breast cancer, and cervical cancer. Checklist reviewing preventive services available has been given to the patient.    Reviewed patients plan of care and provided an AVS. The Basic Care Plan (routine screening as documented in Health Maintenance) for Francisca meets the Care Plan requirement. This Care Plan has been established and reviewed with the Patient.    Counseling Resources:  ATP IV Guidelines  Pooled Cohorts Equation Calculator  Breast Cancer Risk Calculator  FRAX Risk Assessment  ICSI Preventive Guidelines  Dietary Guidelines for Americans, 2010  [employer]'s MyPlate  ASA Prophylaxis  Lung CA Screening    TERRANCE Hardin " [facility]

## 2020-01-09 NOTE — PATIENT INSTRUCTIONS
Patient Education   Personalized Prevention Plan  You are due for the preventive services outlined below.  Your care team is available to assist you in scheduling these services.  If you have already completed any of these items, please share that information with your care team to update in your medical record.  Health Maintenance Due   Topic Date Due     FALL RISK ASSESSMENT  01/07/2020     Zoster (Shingles) Vaccine (3 of 3) 03/04/2019     Annual Wellness Visit  01/07/2020

## 2020-01-13 NOTE — RESULT ENCOUNTER NOTE
Francisca-  Here are your recent results.     Your labs look great!  Cholesterol is well controlled with your medication.  Please continue to take your medication daily. We should follow up in 1 year.    If you have any questions please do not hesitate to contact our office via phone ([phone]) or you may send me a message via [employer] by clicking the contact my Care Team link.      It was a pleasure participating in your care!    Thank you,    Antoni Oneill MPH, PA-C  [address] [place] [address] [phone]

## 2020-01-22 ENCOUNTER — ANCILLARY PROCEDURE (OUTPATIENT)
Dept: MAMMOGRAPHY | Facility: CLINIC | Age: 70
End: 2020-01-22
Payer: COMMERCIAL

## 2020-01-22 DIAGNOSIS — Z12.31 VISIT FOR SCREENING MAMMOGRAM: ICD-10-CM

## 2020-01-22 PROCEDURE — 77067 SCR MAMMO BI INCL CAD: CPT | Mod: TC

## 2020-01-22 PROCEDURE — 77063 BREAST TOMOSYNTHESIS BI: CPT | Mod: TC

## 2020-07-31 DIAGNOSIS — F43.0 ACUTE REACTION TO STRESS: ICD-10-CM

## 2020-07-31 RX ORDER — VENLAFAXINE HYDROCHLORIDE 37.5 MG/1
CAPSULE, EXTENDED RELEASE ORAL
Qty: 90 CAPSULE | Refills: 1 | Status: SHIPPED | OUTPATIENT
Start: 2020-07-31 | End: 2021-01-12

## 2020-07-31 NOTE — TELEPHONE ENCOUNTER
Prescription approved per [facility] Refill Protocol.    Sherlyn SANTANA RN  EP Triage

## 2020-11-10 ENCOUNTER — IMMUNIZATION (OUTPATIENT)
Dept: NURSING | Facility: CLINIC | Age: 70
End: 2020-11-10
Payer: COMMERCIAL

## 2020-11-10 DIAGNOSIS — Z23 NEED FOR PROPHYLACTIC VACCINATION AND INOCULATION AGAINST INFLUENZA: Primary | ICD-10-CM

## 2020-11-10 PROCEDURE — 90662 IIV NO PRSV INCREASED AG IM: CPT

## 2020-11-10 PROCEDURE — G0008 ADMIN INFLUENZA VIRUS VAC: HCPCS

## 2020-11-10 PROCEDURE — 99207 PR NO CHARGE NURSE ONLY: CPT

## 2020-11-19 DIAGNOSIS — F43.0 ACUTE REACTION TO STRESS: ICD-10-CM

## 2020-11-19 RX ORDER — VENLAFAXINE HYDROCHLORIDE 37.5 MG/1
CAPSULE, EXTENDED RELEASE ORAL
Qty: 90 CAPSULE | Refills: 1 | OUTPATIENT
Start: 2020-11-19

## 2021-01-10 DIAGNOSIS — E78.5 HYPERLIPIDEMIA LDL GOAL <130: ICD-10-CM

## 2021-01-10 NOTE — TELEPHONE ENCOUNTER
Failed protocol.  Carolyn JOSHUARN BSN  [facility]  [phone]

## 2021-01-11 RX ORDER — SIMVASTATIN 40 MG
TABLET ORAL
Qty: 30 TABLET | Refills: 0 | Status: SHIPPED | OUTPATIENT
Start: 2021-01-11 | End: 2021-01-12

## 2021-01-12 ENCOUNTER — OFFICE VISIT (OUTPATIENT)
Dept: FAMILY MEDICINE | Facility: CLINIC | Age: 71
End: 2021-01-12
Payer: COMMERCIAL

## 2021-01-12 DIAGNOSIS — Z00.00 ENCOUNTER FOR MEDICARE ANNUAL WELLNESS EXAM: Primary | ICD-10-CM

## 2021-01-12 DIAGNOSIS — F43.0 ACUTE REACTION TO STRESS: ICD-10-CM

## 2021-01-12 DIAGNOSIS — E78.5 HYPERLIPIDEMIA LDL GOAL <130: ICD-10-CM

## 2021-01-12 DIAGNOSIS — K52.839 MICROSCOPIC COLITIS, UNSPECIFIED MICROSCOPIC COLITIS TYPE: ICD-10-CM

## 2021-01-12 DIAGNOSIS — R73.01 IMPAIRED FASTING GLUCOSE: ICD-10-CM

## 2021-01-12 LAB
ALBUMIN SERPL-MCNC: 3.9 G/DL (ref 3.4–5)
ALP SERPL-CCNC: 74 U/L (ref 40–150)
ALT SERPL W P-5'-P-CCNC: 22 U/L (ref 0–50)
ANION GAP SERPL CALCULATED.3IONS-SCNC: 4 MMOL/L (ref 3–14)
AST SERPL W P-5'-P-CCNC: 26 U/L (ref 0–45)
BILIRUB SERPL-MCNC: 0.8 MG/DL (ref 0.2–1.3)
BUN SERPL-MCNC: 16 MG/DL (ref 7–30)
CALCIUM SERPL-MCNC: 9.2 MG/DL (ref 8.5–10.1)
CHLORIDE SERPL-SCNC: 105 MMOL/L (ref 94–109)
CHOLEST SERPL-MCNC: 195 MG/DL
CO2 SERPL-SCNC: 29 MMOL/L (ref 20–32)
CREAT SERPL-MCNC: 0.74 MG/DL (ref 0.52–1.04)
GFR SERPL CREATININE-BSD FRML MDRD: 81 ML/MIN/{1.73_M2}
GLUCOSE SERPL-MCNC: 101 MG/DL (ref 70–99)
HDLC SERPL-MCNC: 77 MG/DL
LDLC SERPL CALC-MCNC: 98 MG/DL
NONHDLC SERPL-MCNC: 118 MG/DL
POTASSIUM SERPL-SCNC: 4.1 MMOL/L (ref 3.4–5.3)
PROT SERPL-MCNC: 7.8 G/DL (ref 6.8–8.8)
SODIUM SERPL-SCNC: 138 MMOL/L (ref 133–144)
TRIGL SERPL-MCNC: 100 MG/DL

## 2021-01-12 PROCEDURE — 36415 COLL VENOUS BLD VENIPUNCTURE: CPT | Performed by: PHYSICIAN ASSISTANT

## 2021-01-12 PROCEDURE — 80061 LIPID PANEL: CPT | Performed by: PHYSICIAN ASSISTANT

## 2021-01-12 PROCEDURE — 80053 COMPREHEN METABOLIC PANEL: CPT | Performed by: PHYSICIAN ASSISTANT

## 2021-01-12 PROCEDURE — 99397 PER PM REEVAL EST PAT 65+ YR: CPT | Performed by: PHYSICIAN ASSISTANT

## 2021-01-12 RX ORDER — SIMVASTATIN 40 MG
TABLET ORAL
Qty: 90 TABLET | Refills: 3 | Status: SHIPPED | OUTPATIENT
Start: 2021-01-12 | End: 2022-03-11

## 2021-01-12 RX ORDER — VENLAFAXINE HYDROCHLORIDE 37.5 MG/1
CAPSULE, EXTENDED RELEASE ORAL
Qty: 90 CAPSULE | Refills: 1 | Status: SHIPPED | OUTPATIENT
Start: 2021-01-12 | End: 2022-01-17

## 2021-01-12 ASSESSMENT — ACTIVITIES OF DAILY LIVING (ADL): CURRENT_FUNCTION: NO ASSISTANCE NEEDED

## 2021-01-12 NOTE — PROGRESS NOTES
"SUBJECTIVE:   Francisca Ramírez is a 70 year old adult who presents for Preventive Visit.      Patient has been advised of split billing requirements and indicates understanding: Yes   Are you in the first 12 months of your Medicare coverage?  No    Healthy Habits:     In general, how would you rate your overall health?  Very good    Frequency of exercise:: walking at work.    Do you usually eat at least 4 servings of fruit and vegetables a day, include whole grains    & fiber and avoid regularly eating high fat or \"junk\" foods?  Yes    Taking medications regularly:  Yes    Barriers to taking medications:  None    Medication side effects:  None    Ability to successfully perform activities of daily living:  No assistance needed    Home Safety:  No safety concerns identified    Hearing Impairment:  No hearing concerns    In the past 6 months, have you been bothered by leaking of urine?  No    In general, how would you rate your overall mental or emotional health?  Excellent      PHQ-2 Total Score: 0    Additional concerns today:  No     Do you feel safe in your environment? Yes    Have you ever done Advance Care Planning? (For example, a Health Directive, POLST, or a discussion with a medical provider or your loved ones about your wishes): Yes, advance care planning is on file.      Fall risk  Fallen 2 or more times in the past year?: No  Any fall with injury in the past year?: No    Cognitive Screening   1) Repeat 3 items (Leader, Season, Table)    2) Clock draw: ABNORMAL time wrong  3) 3 item recall: Recalls 1 object   Results: ABNORMAL clock, 1-2 items recalled: PROBABLE COGNITIVE IMPAIRMENT, **INFORM PROVIDER**    Mini-CogTM Copyright ARISTEO Zarate. Licensed by the author for use in [facility]; reprinted with permission (kale@.[email or web address]). All rights reserved.      Do you have sleep apnea, excessive snoring or daytime drowsiness?: no    Reviewed and updated as needed this visit by clinical staff   Allergies  " Meds              Reviewed and updated as needed this visit by Provider                Social History     Tobacco Use     Smoking status: Never Smoker     Smokeless tobacco: Never Used   Substance Use Topics     Alcohol use: Yes     Comment: two or more on occassion         Alcohol Use 1/5/2019   Prescreen: >3 drinks/day or >7 drinks/week? No   Prescreen: >3 drinks/day or >7 drinks/week? -         Current providers sharing in care for this patient include:   Patient Care Team:  Antoni Oneill PA-C as PCP - General (Physician Assistant)  Antoni Oneill PA-C as Assigned PCP    The following health maintenance items are reviewed in Epic and correct as of today:  Health Maintenance   Topic Date Due     FALL RISK ASSESSMENT  01/09/2021     MEDICARE ANNUAL WELLNESS VISIT  01/09/2021     MAMMO SCREENING  01/22/2022     COLORECTAL CANCER SCREENING  10/16/2024     LIPID  01/09/2025     ADVANCE CARE PLANNING  01/09/2025     DTAP/TDAP/TD IMMUNIZATION (3 - Td) 01/07/2029     DEXA  12/05/2031     HEPATITIS C SCREENING  Completed     DEPRESSION ACTION PLAN  Completed     INFLUENZA VACCINE  Completed     Pneumococcal Vaccine: 65+ Years  Completed     ZOSTER IMMUNIZATION  Completed     Pneumococcal Vaccine: Pediatrics (0 to 5 Years) and At-Risk Patients (6 to 64 Years)  Aged Out     IPV IMMUNIZATION  Aged Out     MENINGITIS IMMUNIZATION  Aged Out     HEPATITIS B IMMUNIZATION  Aged Out     Patient Active Problem List   Diagnosis     Acute reaction to stress     HYPERLIPIDEMIA LDL GOAL <130     Advanced directives, counseling/discussion     Impaired fasting glucose     Knee pain     Family history of colon cancer     Benign neoplasm of skin of trunk, except scrotum     Microscopic colitis, unspecified microscopic colitis type     Arthritis of right knee     Past Surgical History:   Procedure Laterality Date     cataracts       COLONOSCOPY N/A 12/4/2014    Procedure: COLONOSCOPY;  Surgeon: Todd Borrego,  MD;  Location:  GI     EYE SURGERY      Cataract surgery on both eyes     GYN SURGERY  ?    d/c     SURGICAL HISTORY OF -       s/p D&C 25 years ago     SURGICAL HISTORY OF -   2004    Colonoscopy       Social History     Tobacco Use     Smoking status: Never Smoker     Smokeless tobacco: Never Used   Substance Use Topics     Alcohol use: Yes     Comment: two or more on occassion     Family History   Problem Relation Age of Onset     Cancer - colorectal Mother         82 yo      Colon Cancer Mother              Cancer Father         Brain tumor, age 46         Current Outpatient Medications   Medication Sig Dispense Refill     budesonide (ENTOCORT EC) 3 MG EC capsule TK 3 CS PO QD FOR 2 WEEKS THEN 2 X 2 WEEKS THEN 1 QD FOR 2 TO 3 MONTHS  2     hyoscyamine (LEVBID) 0.375 MG 12 hr tablet TK 1 T PO BID  3     simvastatin (ZOCOR) 40 MG tablet TAKE 1 TABLET BY MOUTH EVERY DAY 90 tablet 3     UNKNOWN MED DOSAGE Flexitol heel balm: apply to feet daily as needed 1 1     venlafaxine (EFFEXOR-XR) 37.5 MG 24 hr capsule Take 37.5 mg once daily 90 capsule 1     cholecalciferol (VITAMIN-D) 1000 UNIT capsule Take 1 capsule by mouth daily.       Allergies   Allergen Reactions     Vicodin [Hydrocodone-Acetaminophen]      Recent Labs   Lab Test 20  0957 19  0932 18  0940 17  0940 16  1140 16  1140 14  0919 14  0919 13  1027 12  0943   A1C  --   --   --   --   --   --  5.8  --  5.8 5.6  --    LDL 79 111*  --   --  97   < >  --    < > 93 95  --    HDL 55 70  --   --  68   < >  --    < > 58 54  --    TRIG 78 104  --   --  184*   < >  --    < > 121 184*  --    ALT 26 24 15   < > 22   < >  --    < > 25 29  --    CR 0.59 0.66 0.73   < > 0.80   < >  --    < > 0.68 0.81  --    GFRESTIMATED >90 89 84   < > 71   < >  --    < > 87 71  --    GFRESTBLACK >90 >90 97   < > 85   < >  --    < > >90   GFR Calc   86  --    POTASSIUM 3.9 4.4 4.9   <  "> 4.0   < >  --    < > 4.3 4.1  --    TSH  --   --   --   --   --   --   --   --   --   --  1.37    < > = values in this interval not displayed.      Mammogram Screening: Mammogram Screening: Patient over age 50, mutual decision to screen reflected in health maintenance.    Review of Systems  Constitutional, HEENT, cardiovascular, pulmonary, GI, , musculoskeletal, neuro, skin, endocrine and psych systems are negative, except as otherwise noted.    OBJECTIVE:   There were no vitals taken for this visit. Estimated body mass index is 26.54 kg/m  as calculated from the following:    Height as of 1/9/20: 1.778 m (5' 10\").    Weight as of 1/9/20: 83.9 kg (185 lb).  Physical Exam  GENERAL: healthy, alert and no distress  EYES: Eyes grossly normal to inspection, PERRL and conjunctivae and sclerae normal  HENT: ear canals and TM's normal, nose and mouth without ulcers or lesions  NECK: no adenopathy, no asymmetry, masses, or scars and thyroid normal to palpation  RESP: lungs clear to auscultation - no rales, rhonchi or wheezes  BREAST: normal without masses, tenderness or nipple discharge and no palpable axillary masses or adenopathy  CV: regular rate and rhythm, normal S1 S2, no S3 or S4, no murmur, click or rub, no peripheral edema and peripheral pulses strong  ABDOMEN: soft, nontender, no hepatosplenomegaly, no masses and bowel sounds normal  MS: no gross musculoskeletal defects noted, no edema  SKIN: no suspicious lesions or rashes  NEURO: Normal strength and tone, mentation intact and speech normal  PSYCH: mentation appears normal, affect normal/bright    Diagnostic Test Results:  none     ASSESSMENT / PLAN:   1. Encounter for Medicare annual wellness exam    2. Hyperlipidemia LDL goal <1  - Lipid Profile (Chol, Trig, HDL, LDL calc)  - simvastatin (ZOCOR) 40 MG tablet; TAKE 1 TABLET BY MOUTH EVERY DAY  Dispense: 90 tablet; Refill: 3    3. Impaired fasting glucose  - Comprehensive metabolic panel (BMP + Alb, Alk Phos, " "ALT, AST, Total. Bili, TP)    4. Acute reaction to stress  Controlled, no concerns  - venlafaxine (EFFEXOR-XR) 37.5 MG 24 hr capsule; Take 37.5 mg once daily  Dispense: 90 capsule; Refill: 1    5. Microscopic colitis, unspecified microscopic colitis type  stable with budesonide. Following with Dr. Farr GI      COUNSELING:  Reviewed preventive health counseling, as reflected in patient instructions       Regular exercise       Healthy diet/nutrition       Colon cancer screening    Estimated body mass index is 26.54 kg/m  as calculated from the following:    Height as of 1/9/20: 1.778 m (5' 10\").    Weight as of 1/9/20: 83.9 kg (185 lb).        Francisca Ramírez reports that Francisca Ramírez has never smoked. Francisca Ramírez has never used smokeless tobacco.      Appropriate preventive services were discussed with this patient, including applicable screening as appropriate for cardiovascular disease, diabetes, osteopenia/osteoporosis, and glaucoma.  As appropriate for age/gender, discussed screening for colorectal cancer, prostate cancer, breast cancer, and cervical cancer. Checklist reviewing preventive services available has been given to the patient.    Reviewed patients plan of care and provided an AVS. The Basic Care Plan (routine screening as documented in Health Maintenance) for Francisca meets the Care Plan requirement. This Care Plan has been established and reviewed with the Patient.    Counseling Resources:  ATP IV Guidelines  Pooled Cohorts Equation Calculator  Breast Cancer Risk Calculator  Breast Cancer: Medication to Reduce Risk  FRAX Risk Assessment  ICSI Preventive Guidelines  Dietary Guidelines for Americans, 2010  [employer]'s MyPlate  ASA Prophylaxis  Lung CA Screening    TERRANCE Hardin [facility]    Identified Health Risks:  "

## 2021-01-14 NOTE — RESULT ENCOUNTER NOTE
Francisca-      It was nice to see you this week!    Your labs look great.  Your cholesterol is well controlled on your Zocor and your metabolic panel is normal with the exception of a minimally elevated blood sugar which is stable from your previous levels.    We should recheck these next year.    If you have any questions please do not hesitate to contact our office via phone ([phone]) or you may send me a message via [employer] by clicking the contact my Care Team link.      It was a pleasure participating in your care!    Thank you,    Antoni Oneill MPH, PA-C  [address] [place] [address] [phone]

## 2021-02-02 ENCOUNTER — OFFICE VISIT (OUTPATIENT)
Dept: FAMILY MEDICINE | Facility: CLINIC | Age: 71
End: 2021-02-02
Payer: COMMERCIAL

## 2021-02-02 VITALS
DIASTOLIC BLOOD PRESSURE: 62 MMHG | HEIGHT: 70 IN | HEART RATE: 66 BPM | SYSTOLIC BLOOD PRESSURE: 118 MMHG | TEMPERATURE: 97.4 F | OXYGEN SATURATION: 98 % | WEIGHT: 177.4 LBS | BODY MASS INDEX: 25.4 KG/M2

## 2021-02-02 DIAGNOSIS — K52.839 MICROSCOPIC COLITIS, UNSPECIFIED MICROSCOPIC COLITIS TYPE: Primary | ICD-10-CM

## 2021-02-02 DIAGNOSIS — R19.7 DIARRHEA, UNSPECIFIED TYPE: ICD-10-CM

## 2021-02-02 PROCEDURE — 99214 OFFICE O/P EST MOD 30 MIN: CPT | Performed by: PHYSICIAN ASSISTANT

## 2021-02-02 RX ORDER — BUDESONIDE 3 MG/1
CAPSULE, COATED PELLETS ORAL
Qty: 160 CAPSULE | Refills: 1 | Status: SHIPPED | OUTPATIENT
Start: 2021-02-02 | End: 2023-06-22

## 2021-02-02 ASSESSMENT — MIFFLIN-ST. JEOR: SCORE: 1394.93

## 2021-02-02 NOTE — LETTER
[facility]          [address] [place] [address]                            [phone]  Fax: [phone]    Francisca Ramírez  [address] [address]    [phone]    February 2, 2021      To whom it may concern    Francisca Ramírez was seen in my office on 2/2/2021.  She has been diagnosed with microscopic colitis and may require intermittent leaves from work if she is experiencing symptoms off illness. She may need up to 2 days off per week over the next 2 months for her symptoms should they persist. Her restrictions will be evaluated before 3/9/2020.  Please let me know if you have other questions or need further documentation.        Sincerely,      Antoni Oneill PA-C

## 2021-02-02 NOTE — PROGRESS NOTES
Assessment & Plan       Today I reviewed Francisca's most recent labs and colonoscopy and pathology that was diagnostic for lymphocytic colitis. Her symptoms today are similar to her previous episodes.  Given the more sudden onset, I have advised that she begin her budesonide taper again and if no improvement within 5-7 days that she have stool studies completed.  If these are negative and symptoms persist, she will follow up again with Dr. Farr      Microscopic colitis, unspecified microscopic colitis type  - budesonide (ENTOCORT EC) 3 MG EC capsule; TK 3 CS PO QD FOR 2 WEEKS THEN 2 X 2 WEEKS THEN 1 QD FOR 2 TO 3 MONTHS    Diarrhea, unspecified type  - Enteric Bacteria and Virus Panel by SOHAIL Stool; Future  - Clostridium difficile Toxin B PCR; Future  - Ova and Parasite Exam Routine; Future      Return in about 2 weeks (around 2/16/2021) for follow up.    TERRANCE Hardin [facility]     Francisca is a 70 year old who presents to clinic today for the following health issues     HPI       Concern - colitis  Onset: 2 days  Description: yesterday and today stayed home. No pain but cannot get out of the bathroom due to the diarrhea   Intensity: at this time she feels that the pills give to her by GI doctor. She has not been taking them as directed  Progression of Symptoms:  same  Accompanying Signs & Symptoms: diarrhea  Previous history of similar problem: yes  Precipitating factors:        Worsened by: food, nerves  Alleviating factors:        Improved by: watching food intake and types of food eaten  Therapies tried and outcome: currently on regiment of medication hyoscyamine, budesonide      Francisca is a 69 y/o female with PMH microscopic colitis diagnosed by colonoscopy in 2019 with [facility]. [facility]. She was started on budesonide taper which initially worked quite well but she admits that she did not finish the full taper.  Over the past 2 days she one again has  "developed persistent, non bloody watery diarrhea.  She has had multiple episodes each day.  No associated fevers, abdominal pain, n/v and no recent travel or illness exposure.  She is currently out of her budesonide.    Review of Systems   Constitutional, HEENT, cardiovascular, pulmonary, GI, , musculoskeletal, neuro, skin, endocrine and psych systems are negative, except as otherwise noted.      Objective    /62   Pulse 66   Temp 97.4  F (36.3  C) (Tympanic)   Ht 1.778 m (5' 10\")   Wt 80.5 kg (177 lb 6.4 oz)   SpO2 98%   BMI 25.45 kg/m    Body mass index is 25.45 kg/m .  Physical Exam   GENERAL: healthy, alert and no distress  RESP: lungs clear to auscultation - no rales, rhonchi or wheezes  CV: regular rate and rhythm, normal S1 S2, no S3 or S4, no murmur, click or rub  ABDOMEN: soft, nontender, no hepatosplenomegaly, no masses and bowel sounds normal  PSYCH: mentation appears normal, affect normal/bright                "

## 2021-02-03 DIAGNOSIS — R19.7 DIARRHEA, UNSPECIFIED TYPE: ICD-10-CM

## 2021-02-03 LAB
C COLI+JEJUNI+LARI FUSA STL QL NAA+PROBE: NOT DETECTED
C DIFF TOX B STL QL: NEGATIVE
EC STX1 GENE STL QL NAA+PROBE: NOT DETECTED
EC STX2 GENE STL QL NAA+PROBE: NOT DETECTED
ENTERIC PATHOGEN COMMENT: NORMAL
NOROV GI+II ORF1-ORF2 JNC STL QL NAA+PR: NOT DETECTED
RVA NSP5 STL QL NAA+PROBE: NOT DETECTED
SALMONELLA SP RPOD STL QL NAA+PROBE: NOT DETECTED
SHIGELLA SP+EIEC IPAH STL QL NAA+PROBE: NOT DETECTED
SPECIMEN SOURCE: NORMAL
V CHOL+PARA RFBL+TRKH+TNAA STL QL NAA+PR: NOT DETECTED
Y ENTERO RECN STL QL NAA+PROBE: NOT DETECTED

## 2021-02-03 PROCEDURE — 87177 OVA AND PARASITES SMEARS: CPT | Performed by: PHYSICIAN ASSISTANT

## 2021-02-03 PROCEDURE — 87493 C DIFF AMPLIFIED PROBE: CPT | Mod: 59 | Performed by: PHYSICIAN ASSISTANT

## 2021-02-03 PROCEDURE — 87506 IADNA-DNA/RNA PROBE TQ 6-11: CPT | Performed by: PHYSICIAN ASSISTANT

## 2021-02-03 PROCEDURE — 87209 SMEAR COMPLEX STAIN: CPT | Performed by: PHYSICIAN ASSISTANT

## 2021-02-04 LAB
O+P STL MICRO: NORMAL
SPECIMEN SOURCE: NORMAL

## 2021-02-05 NOTE — RESULT ENCOUNTER NOTE
Your stool cultures are all negative at this time. Please let me know how you are feeling with the budesonide      Antoni

## 2021-03-08 ENCOUNTER — IMMUNIZATION (OUTPATIENT)
Dept: NURSING | Facility: CLINIC | Age: 71
End: 2021-03-08
Payer: COMMERCIAL

## 2021-03-08 PROCEDURE — 91300 PR COVID VAC PFIZER DIL RECON 30 MCG/0.3 ML IM: CPT

## 2021-03-08 PROCEDURE — 0001A PR COVID VAC PFIZER DIL RECON 30 MCG/0.3 ML IM: CPT

## 2021-03-29 ENCOUNTER — IMMUNIZATION (OUTPATIENT)
Dept: NURSING | Facility: CLINIC | Age: 71
End: 2021-03-29
Attending: INTERNAL MEDICINE
Payer: COMMERCIAL

## 2021-03-29 PROCEDURE — 91300 PR COVID VAC PFIZER DIL RECON 30 MCG/0.3 ML IM: CPT

## 2021-03-29 PROCEDURE — 0002A PR COVID VAC PFIZER DIL RECON 30 MCG/0.3 ML IM: CPT

## 2021-07-31 ENCOUNTER — TRANSFERRED RECORDS (OUTPATIENT)
Dept: HEALTH INFORMATION MANAGEMENT | Facility: CLINIC | Age: 71
End: 2021-07-31

## 2021-09-04 ENCOUNTER — HEALTH MAINTENANCE LETTER (OUTPATIENT)
Age: 71
End: 2021-09-04

## 2021-09-09 ENCOUNTER — TELEPHONE (OUTPATIENT)
Dept: FAMILY MEDICINE | Facility: CLINIC | Age: 71
End: 2021-09-09

## 2021-09-09 NOTE — TELEPHONE ENCOUNTER
Looks like Francisca has had her Shingrix series and so she is not due for any further shingles vaccinations.

## 2021-09-09 NOTE — TELEPHONE ENCOUNTER
Pt calling wondering if she is due for shingles shot. She is due for flu shot and [facility] She will do the flu shot at [employer] where she works. TC will call to update pt and okay to leave detailed message.   Gia Porras

## 2021-09-13 ENCOUNTER — TRANSFERRED RECORDS (OUTPATIENT)
Dept: HEALTH INFORMATION MANAGEMENT | Facility: CLINIC | Age: 71
End: 2021-09-13

## 2022-01-10 DIAGNOSIS — F43.0 ACUTE REACTION TO STRESS: ICD-10-CM

## 2022-01-10 NOTE — TELEPHONE ENCOUNTER
Sent message asking how she is taking- should have run out 7/2021    Carolyn JOSHUARN BSN  [facility]  [phone]

## 2022-01-14 NOTE — TELEPHONE ENCOUNTER
Patient Contact     Attempt # 2     Was call answered?    No  Left non-detailed message to call the clinic back at [phone].      On call back:      -Clarify how pt is taking this medication, see below.     Harmony PRICE RN  [facility]

## 2022-01-17 RX ORDER — VENLAFAXINE HYDROCHLORIDE 37.5 MG/1
CAPSULE, EXTENDED RELEASE ORAL
Qty: 90 CAPSULE | Refills: 1 | Status: SHIPPED | OUTPATIENT
Start: 2022-01-17 | End: 2023-02-14

## 2022-01-17 NOTE — TELEPHONE ENCOUNTER
Possibly patient had prior overlapping scripts?     Last filled per pharmacy #90/90 day on 10/16/21     Pended Rx for 90 day     Keila MARTINEZ, Triage RN  [facility]

## 2022-02-19 ENCOUNTER — HEALTH MAINTENANCE LETTER (OUTPATIENT)
Age: 72
End: 2022-02-19

## 2022-03-11 DIAGNOSIS — E78.5 HYPERLIPIDEMIA LDL GOAL <130: ICD-10-CM

## 2022-03-11 RX ORDER — SIMVASTATIN 40 MG
TABLET ORAL
Qty: 90 TABLET | Refills: 0 | Status: SHIPPED | OUTPATIENT
Start: 2022-03-11 | End: 2022-06-14

## 2022-03-11 NOTE — TELEPHONE ENCOUNTER
Medication is being filled for 1 time refill only due to:  Patient needs to be seen because it has been more than one year since last visit.     Sherlyn SANTANA RN  EP Triage

## 2022-03-17 NOTE — TELEPHONE ENCOUNTER
Spoke to pt, states due to insurance reasons she is no longer able to come to this clinic.   Minor CASANOVA, CMA

## 2022-04-16 ENCOUNTER — HEALTH MAINTENANCE LETTER (OUTPATIENT)
Age: 72
End: 2022-04-16

## 2022-08-07 DIAGNOSIS — E78.5 HYPERLIPIDEMIA LDL GOAL <130: ICD-10-CM

## 2022-08-08 NOTE — TELEPHONE ENCOUNTER
Routing refill request to provider for review/approval because:  Pt is due for a visit.  Sent [employer] today notifying patient along with scheduling link.  LDL Cholesterol Calculated   Date Value Ref Range Status   01/12/2021 98 <100 mg/dL Final     Comment:     Desirable:       <100 mg/dl      Yamel Gan RN

## 2022-08-09 RX ORDER — SIMVASTATIN 40 MG
TABLET ORAL
Qty: 30 TABLET | Refills: 0 | Status: SHIPPED | OUTPATIENT
Start: 2022-08-09 | End: 2022-08-26

## 2022-09-01 DIAGNOSIS — F43.0 ACUTE REACTION TO STRESS: ICD-10-CM

## 2022-09-02 RX ORDER — VENLAFAXINE HYDROCHLORIDE 37.5 MG/1
CAPSULE, EXTENDED RELEASE ORAL
Qty: 90 CAPSULE | Refills: 0 | OUTPATIENT
Start: 2022-09-02

## 2022-09-02 NOTE — TELEPHONE ENCOUNTER
Patient no longer in [facility], has established outside of [facility] (see refill documentation from 3/2022). Please have pharmacy update their records.

## 2022-09-02 NOTE — TELEPHONE ENCOUNTER
Routing refill request to provider for review/approval because:  Patient needs to be seen because it has been more than 1 year since last office visit. Patient has been sent two previous "[employer] messages and has read them. No future appointments have been made.     Yamel Gan RN

## 2022-10-22 ENCOUNTER — HEALTH MAINTENANCE LETTER (OUTPATIENT)
Age: 72
End: 2022-10-22

## 2022-11-27 DIAGNOSIS — E78.5 HYPERLIPIDEMIA LDL GOAL <130: ICD-10-CM

## 2022-11-30 NOTE — TELEPHONE ENCOUNTER
Routing refill request to provider for review/approval because:  Labs not current:    LDL Cholesterol Calculated   Date Value Ref Range Status   01/12/2021 98 <100 mg/dL Final     Comment:     Desirable:       <100 mg/dl     Pt already scheduled for future OV.  Appointments in Next Year      Dec 20, 2022 10:00 AM  (Arrive by 9:40 AM)  Provider Visit with Alen Vega MD  [facility] ([facility] [place] ) [phone]           Emory Duncan RN

## 2022-12-01 RX ORDER — SIMVASTATIN 40 MG
TABLET ORAL
Qty: 30 TABLET | Refills: 0 | Status: SHIPPED | OUTPATIENT
Start: 2022-12-01 | End: 2023-01-11

## 2022-12-20 ENCOUNTER — OFFICE VISIT (OUTPATIENT)
Dept: FAMILY MEDICINE | Facility: CLINIC | Age: 72
End: 2022-12-20
Payer: COMMERCIAL

## 2022-12-20 VITALS
TEMPERATURE: 97 F | OXYGEN SATURATION: 99 % | WEIGHT: 174 LBS | DIASTOLIC BLOOD PRESSURE: 68 MMHG | HEIGHT: 70 IN | BODY MASS INDEX: 24.91 KG/M2 | SYSTOLIC BLOOD PRESSURE: 134 MMHG | RESPIRATION RATE: 17 BRPM | HEART RATE: 70 BPM

## 2022-12-20 DIAGNOSIS — Z23 NEED FOR PROPHYLACTIC VACCINATION AND INOCULATION AGAINST INFLUENZA: ICD-10-CM

## 2022-12-20 DIAGNOSIS — R79.89 ELEVATED TSH: ICD-10-CM

## 2022-12-20 DIAGNOSIS — E03.8 SUBCLINICAL HYPOTHYROIDISM: ICD-10-CM

## 2022-12-20 DIAGNOSIS — E55.9 VITAMIN D DEFICIENCY: ICD-10-CM

## 2022-12-20 DIAGNOSIS — Z23 HIGH PRIORITY FOR 2019-NCOV VACCINE: ICD-10-CM

## 2022-12-20 DIAGNOSIS — Z12.31 VISIT FOR SCREENING MAMMOGRAM: ICD-10-CM

## 2022-12-20 DIAGNOSIS — F09 COGNITIVE DYSFUNCTION: Primary | ICD-10-CM

## 2022-12-20 DIAGNOSIS — Z13.21 ENCOUNTER FOR VITAMIN DEFICIENCY SCREENING: ICD-10-CM

## 2022-12-20 LAB
ALBUMIN SERPL BCG-MCNC: 4.1 G/DL (ref 3.5–5.2)
ALP SERPL-CCNC: 62 U/L (ref 35–129)
ALT SERPL W P-5'-P-CCNC: 16 U/L (ref 10–50)
ANION GAP SERPL CALCULATED.3IONS-SCNC: 10 MMOL/L (ref 7–15)
AST SERPL W P-5'-P-CCNC: 32 U/L (ref 10–50)
BILIRUB SERPL-MCNC: 0.6 MG/DL
BUN SERPL-MCNC: 17.6 MG/DL (ref 8–23)
CALCIUM SERPL-MCNC: 9.6 MG/DL (ref 8.8–10.2)
CHLORIDE SERPL-SCNC: 105 MMOL/L (ref 98–107)
CREAT SERPL-MCNC: 0.63 MG/DL (ref 0.51–1.17)
DEPRECATED HCO3 PLAS-SCNC: 28 MMOL/L (ref 22–29)
ERYTHROCYTE [DISTWIDTH] IN BLOOD BY AUTOMATED COUNT: 12.7 % (ref 10–15)
GFR SERPL CREATININE-BSD FRML MDRD: >90 ML/MIN/1.73M2
GLUCOSE SERPL-MCNC: 70 MG/DL (ref 70–99)
HCT VFR BLD AUTO: 42.6 % (ref 35–53)
HGB BLD-MCNC: 13.7 G/DL (ref 11.7–17.7)
MCH RBC QN AUTO: 32.6 PG (ref 26.5–33)
MCHC RBC AUTO-ENTMCNC: 32.2 G/DL (ref 31.5–36.5)
MCV RBC AUTO: 101 FL (ref 78–100)
PLATELET # BLD AUTO: 181 10E3/UL (ref 150–450)
POTASSIUM SERPL-SCNC: 4.4 MMOL/L (ref 3.4–5.3)
PROT SERPL-MCNC: 6.8 G/DL (ref 6.4–8.3)
RBC # BLD AUTO: 4.2 10E6/UL (ref 3.8–5.9)
SODIUM SERPL-SCNC: 143 MMOL/L (ref 136–145)
TSH SERPL DL<=0.005 MIU/L-ACNC: 2.14 UIU/ML (ref 0.3–4.2)
WBC # BLD AUTO: 6.2 10E3/UL (ref 4–11)

## 2022-12-20 PROCEDURE — 82306 VITAMIN D 25 HYDROXY: CPT | Performed by: FAMILY MEDICINE

## 2022-12-20 PROCEDURE — 90662 IIV NO PRSV INCREASED AG IM: CPT | Performed by: FAMILY MEDICINE

## 2022-12-20 PROCEDURE — 80053 COMPREHEN METABOLIC PANEL: CPT | Performed by: FAMILY MEDICINE

## 2022-12-20 PROCEDURE — 99214 OFFICE O/P EST MOD 30 MIN: CPT | Mod: 25 | Performed by: FAMILY MEDICINE

## 2022-12-20 PROCEDURE — 91312 COVID-19 VACCINE BIVALENT BOOSTER 12+ (PFIZER): CPT | Performed by: FAMILY MEDICINE

## 2022-12-20 PROCEDURE — 0124A COVID-19 VACCINE BIVALENT BOOSTER 12+ (PFIZER): CPT | Performed by: FAMILY MEDICINE

## 2022-12-20 PROCEDURE — G0008 ADMIN INFLUENZA VIRUS VAC: HCPCS | Mod: 59 | Performed by: FAMILY MEDICINE

## 2022-12-20 PROCEDURE — 84443 ASSAY THYROID STIM HORMONE: CPT | Performed by: FAMILY MEDICINE

## 2022-12-20 PROCEDURE — 36415 COLL VENOUS BLD VENIPUNCTURE: CPT | Performed by: FAMILY MEDICINE

## 2022-12-20 PROCEDURE — 85027 COMPLETE CBC AUTOMATED: CPT | Performed by: FAMILY MEDICINE

## 2022-12-20 ASSESSMENT — PAIN SCALES - GENERAL: PAINLEVEL: NO PAIN (0)

## 2022-12-20 NOTE — PROGRESS NOTES
"  Assessment & Plan     Cognitive dysfunction  Has been worsening with memory loss,  Will have her to check lab and refer her to neuropsychology testing for further evaluation   - Adult Neuropsychology Referral; Future  - CBC with platelets; Future  - Comprehensive metabolic panel (BMP + Alb, Alk Phos, ALT, AST, Total. Bili, TP); Future  - TSH with free T4 reflex; Future  - Vitamin D Deficiency; Future    Encounter for vitamin deficiency screening  mentioned above   - CBC with platelets; Future  - Comprehensive metabolic panel (BMP + Alb, Alk Phos, ALT, AST, Total. Bili, TP); Future  - TSH with free T4 reflex; Future  - Vitamin D Deficiency; Future    Elevated TSH    - TSH with free T4 reflex; Future    Vitamin D deficiency    - Vitamin D Deficiency; Future    Subclinical hypothyroidism    - TSH with free T4 reflex; Future           FUTURE APPOINTMENTS:       - Follow-up visit in 3 months after testing done      No follow-ups on file.    Alen Vega MD  [facility]osa is a 72 year old presenting for the following health issues:  No chief complaint on file.      History of Present Illness       Reason for visit:  Memory    Francisca Ramírez eats 2-3 servings of fruits and vegetables daily.Francisca Ramírez consumes 0 sweetened beverage(s) daily.Francisca Ramírez exercises with enough effort to increase Francisca Ramírez's heart rate 10 to 19 minutes per day.  Francisca Ramírez exercises with enough effort to increase Francisca Ramírez's heart rate 3 or less days per week.   Francisca Ramírez is taking medications regularly.             Review of Systems   Constitutional, HEENT, cardiovascular, pulmonary, gi and gu systems are negative, except as otherwise noted.      Objective    /68   Pulse 70   Temp 97  F (36.1  C) (Temporal)   Resp 17   Ht 1.778 m (5' 10\")   Wt 78.9 kg (174 lb)   SpO2 99%   BMI 24.97 kg/m    Body mass index is 24.97 kg/m .  Physical Exam "   GENERAL: healthy, alert and no distress  NECK: no adenopathy, no asymmetry, masses, or scars and thyroid normal to palpation  RESP: lungs clear to auscultation - no rales, rhonchi or wheezes  CV: regular rate and rhythm, normal S1 S2, no S3 or S4, no murmur, click or rub, no peripheral edema and peripheral pulses strong  ABDOMEN: soft, nontender, no hepatosplenomegaly, no masses and bowel sounds normal  MS: no gross musculoskeletal defects noted, no edema

## 2022-12-21 LAB — DEPRECATED CALCIDIOL+CALCIFEROL SERPL-MC: 8 UG/L (ref 20–75)

## 2022-12-21 RX ORDER — ERGOCALCIFEROL 1.25 MG/1
50000 CAPSULE, LIQUID FILLED ORAL WEEKLY
Qty: 8 CAPSULE | Refills: 0 | Status: SHIPPED | OUTPATIENT
Start: 2022-12-21 | End: 2023-02-09

## 2023-01-10 DIAGNOSIS — E78.5 HYPERLIPIDEMIA LDL GOAL <130: ICD-10-CM

## 2023-01-11 RX ORDER — SIMVASTATIN 40 MG
TABLET ORAL
Qty: 90 TABLET | Refills: 1 | Status: SHIPPED | OUTPATIENT
Start: 2023-01-11 | End: 2023-07-24

## 2023-02-11 DIAGNOSIS — F43.0 ACUTE REACTION TO STRESS: ICD-10-CM

## 2023-02-14 DIAGNOSIS — F43.0 ACUTE REACTION TO STRESS: ICD-10-CM

## 2023-02-14 RX ORDER — VENLAFAXINE HYDROCHLORIDE 37.5 MG/1
CAPSULE, EXTENDED RELEASE ORAL
Qty: 90 CAPSULE | Refills: 2 | Status: SHIPPED | OUTPATIENT
Start: 2023-02-14 | End: 2023-12-15

## 2023-02-14 RX ORDER — VENLAFAXINE HYDROCHLORIDE 37.5 MG/1
CAPSULE, EXTENDED RELEASE ORAL
Qty: 90 CAPSULE | Refills: 1 | Status: CANCELLED | OUTPATIENT
Start: 2023-02-14

## 2023-02-14 NOTE — TELEPHONE ENCOUNTER
Medication Question or Refill    Contacts       Type Contact Phone/Fax    02/14/2023 11:07 AM CST Phone (Incoming) Francisca Ramírez (Self) none (W)          What medication are you calling about (include dose and sig)?:   venlafaxine (EFFEXOR-XR) 37.5 MG 24 hr capsule    Preferred Pharmacy:      [facility] [id number] [place] [facility] [place] [facility] [address]  [address] [facility] [address]  Phone: [phone] Fax: [phone]      Controlled Substance Agreement on file:   CSA -- Patient Level:    CSA: None found at the patient level.       Who prescribed the medication?: Vega    Do you need a refill? Yes    When did you use the medication last? today    Patient offered an appointment? No    Do you have any questions or concerns?  No      Agueda Onofre/Cresencio-  [facility]

## 2023-02-20 ENCOUNTER — TELEPHONE (OUTPATIENT)
Dept: FAMILY MEDICINE | Facility: CLINIC | Age: 73
End: 2023-02-20

## 2023-02-20 NOTE — TELEPHONE ENCOUNTER
Needs of attention regarding:  -Breast Cancer Screening    Health Maintenance Topics with due status: Overdue       Topic Date Due    MAMMO SCREENING 01/22/2022    PHQ-2 (once per calendar year) 01/01/2023       Communication:  Patient called - message left

## 2023-05-15 ENCOUNTER — OFFICE VISIT (OUTPATIENT)
Dept: NEUROPSYCHOLOGY | Facility: CLINIC | Age: 73
End: 2023-05-15
Attending: FAMILY MEDICINE
Payer: COMMERCIAL

## 2023-05-15 DIAGNOSIS — G31.84 AMNESTIC MCI (MILD COGNITIVE IMPAIRMENT WITH MEMORY LOSS): Primary | ICD-10-CM

## 2023-05-15 PROCEDURE — 96132 NRPSYC TST EVAL PHYS/QHP 1ST: CPT

## 2023-05-15 PROCEDURE — 96138 PSYCL/NRPSYC TECH 1ST: CPT

## 2023-05-15 PROCEDURE — 96133 NRPSYC TST EVAL PHYS/QHP EA: CPT

## 2023-05-15 PROCEDURE — 90791 PSYCH DIAGNOSTIC EVALUATION: CPT

## 2023-05-15 PROCEDURE — 96139 PSYCL/NRPSYC TST TECH EA: CPT

## 2023-05-15 NOTE — LETTER
5/15/2023       RE: Francisca Ramírez  [address]  [place] [address]     Dear Colleague,    Thank you for referring your patient, Francisca Ramírez, to the [facility] NEUROPSYCHOLOGY [place] at [facility]. Please see a copy of my visit note below.    NEUROPSYCHOLOGICAL EVALUATION  **CONFIDENTIAL**    **This is a medical document intended for communication with the referring provider. It is written in medical language and may contain abbreviations or verbiage that are unfamiliar. It may appear blunt or direct. This report and the results within are not intended to be interpreted in isolation without consultation with your medical provider. **      Name: Francisca Ramírez Education: 12 years    (age): 1950 (72 years) HUERTA:  5/15/2023   Referral: Alen Vega MD  Department of Family Medicine Handedness:  MRN (Epic): Right  [id number]     IDENTIFYING INFORMATION AND REASON FOR REFERRAL   Ms. Ramírez is a 72-year-old, right-handed, White female with a history of depression. This evaluation was requested to provide a comprehensive assessment of her current neuropsychological status to inform treatment planning.     IMPRESSION  See below for relevant background information and detailed test results. See separate abstract for supporting documentation including a list of neuropsychological measures and test scores.    Ms. Ramírez s neuropsychological profile revealed variable encoding but severely impaired retrieval on tests of verbal and visual memory with no details recalled after a delay across memory tests, and impaired verbal recognition notable for a false-positive response bias. She also exhibited difficulty on tests of planning/organization, response inhibition, and knowledge of health and safety. Additionally, she was not fully orientated to person (incorrectly stating her age) or time (3 days off on the date and 2 days off on the day  of the week). Performance was within expectation across other cognitive tests including immediate auditory attention and working memory, speeded processing, language, basic visuospatial processing, abstract reasoning, and mental flexibility. Assessment of current psychological and emotional status revealed mild symptoms of anxiety and the absence of clinically significant depressive mood symptoms.    Overall, Ms. Ramírez exhibited significant difficulty on tests of verbal and visual memory characterized by amnestic rapid forgetting of information with low recognition scores, and difficulties on select tests of executive functioning. While findings suggest Ms. Ramírez may experience cognitive inefficiencies in her day-to-day life, she and her son reported limited functional difficulties at present. Importantly, she has several cognitive strengths that will be beneficial to her in order to compensate for any cognitive weaknesses. Taken together, observed test performance and reported levels of day-to-day functioning suggest a diagnosis of amnestic mild cognitive impairment (i.e., mild neurocognitive disorder). These findings are suggestive of an underlying neurodegenerative process and ongoing monitoring of her cognitive and functional status over time is recommended.       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 It is recommended that trusted family provide periodic oversight and monitoring for complex activities of daily living to ensure her safety and wellbeing are maintained.    Family members and providers may note that when communicating with Ms. Ramírez, she may benefit from novel information being broken down in small chunks and repeated across time to facilitate recall.  Additionally, we encourage Ms. Ramírez to be accompanied to all medical appointments to ensure the  accurate exchange and recollection of information.  Ms. Ramírez is encouraged to continue to live a healthy lifestyle that promotes brain health, including getting appropriate exercise (as advised by her healthcare providers), getting regular sleep, and eating healthy.    We recommend that Ms. Ramírez continue to maintain a socially active lifestyle. She is likely to benefit from continuing to engage in social activities that she enjoys as these activities may offer cognitive stimulation, help to prevent feelings of depression, and provide emotional benefits that will maximize quality of life.   Ms. Ramírez may wish to discuss pharmacological treatment options for cognitive decline with her prescribing physicians.  Ms. Ramírez is encouraged to utilize the following behavioral strategies to maximize cognitive functioning in her daily life:   -Eliminate distraction. Eliminating environmental distracters can facilitate attention and memory performance. For example, turning off music or the television while having a conversation, so that all of your attention is focused on the task at hand.  -Work on decreasing interference from intrusive thoughts. When intrusive thoughts begin to interfere with your thinking, do not concentrate on them. Instead, observe them passively and calmly redirect your attention back to task.   -Engage in calming activities that increase focus on the present. Incorporating mindfulness techniques such as meditation, relaxation, yoga, and moderate cardiovascular exercise, into your daily routine will help keep you present-oriented and consequently improve attention and memory. Apps like Calm, and CoAlign, have guided medications and mindfulness tools (search  mindfulness meditation ).  -Pay mindful attention. You may find that you need to make a conscious effort to pay attention to conversations or when learning new information. When attention is not focused, it is difficult for the brain  to learn new information. Thus, making a mindful effort to pay attention as you go through daily tasks will assist and facilitate memory recall later on.  -Allow for time. Take the time needed to learn and recall information. Some people tend to worry if they can't immediately recall something. Instead, you should take time to express a thought, or complete a task rather than be critical or harsh on yourself.  -Use external aids to increase structure in daily life. Ongoing use of compensatory strategies such as notes, lists, and a centralized calendar are supported. Tools such as smart phones with alarms and reminders are helpful in bringing structure to daily activities.   The current evaluation will serve as an objective cognitive baseline against which results of future evaluations may be compared. Ms. Ramírez may be referred for a follow-up neuropsychological evaluation in 12 months to objectively assess neurocognitive change.     Thank you for the opportunity to participate in Ms. Ramírez s care.  These findings and recommendations were reviewed with the patient and her son, Wilmar, in a separate feedback session on 5/16/2023 and all their questions were answered. If you have any questions regarding this evaluation, please do not hesitate to contact me.       Mayela Marr, Ph.D.,   Clinical Neuropsychologist    RELEVANT BACKGROUND INFORMATION AND SUPPORTING DOCUMENTATION  Gathered from a clinical interview with the patient and her son, Wilmar, and reviews of electronic medical record.     History of Presenting Problem(s)  Ms. Ramírez presented with a history of depression. She reported memory complaints for the past 1-1.5 years that have remained relatively stable over time. Specifically, she noted some days are better than others and she struggles to recall unimportant conversations, particularly when stressed. Cues are occasionally helpful. She noted she has more difficulty paying attention to  conversations and she may be thinking of other things. She denied trouble remembering important conversations. 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 Her son noted cognitive changes in the past couple years characterized by forgetting conversations and repeating questions. He corroborated that some days seem better than others but noted that his brother, who she lives with, believes her cognition is declining. He noted that she has always struggled with organization, and he has recently began assisting with managing finances as a result. She is otherwise independent with activities of daily living including managing medical appointments, medications, meal preparation, driving, and basic self-care. Physically, she reported 1 backwards fall last year during which she hit the back of her head without LOC or persisting symptoms. She denied additional falls or other sensorimotor disturbances. Emotionally, she denied any symptoms of depression, anxiety, or psychological distress recently.     Neurodiagnostic Findings   No neuroimaging available    Medical History  Microscopic colitis  Benign neoplasm of skin  Arthritis  Denied history of stroke, seizure, or head injury with LOC or persisting symptoms    Health Behaviors   Sleep: ~8 hours of cumulative sleep nightly; denied sleep disturbance; denied snoring, gasping arousals, or parasomnic behaviors  Exercise: Walking around the store at work; denied exercise outside of work  Pain: Knee pain when walking; denied pain during this evaluation    Psychiatric History  Ms. Ramírez reported current mood is  fine   History is notable for depression that has been well managed with medication for many years; she denied any recent symptoms of depression  She otherwise denied history of hypomanic or manic mood symptoms, excessive anxiety or uncontrollable worry, compulsive behaviors, alteration of sensory perceptual  processes or disordered thought.  Suicidality/ Self-harm: She denied any suicidal ideation, plan, or intent  Psychiatric treatment: Currently prescribed venlafaxine    Substance Use History  Alcohol: None  Nicotine: None  Cannabis: None  Problematic Substance Use: Denied    Current Medications (per patient report)  venlafaxine  simvastatin  vitamin D    Educational, & Occupational History  Childhood behavioral / emotional / academic problems: Reported math was difficult, but she did not receive any assistance or accommodations  Native Language: English  Education: Described self as an average student; graduated high school on time; may have taken a couple of courses in vocational school  Occupation: ; customer service; 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 Descriptive ranges are based on American Academy of Clinical Neuropsychology (2020) consensus guidelines, or test manuals where appropriate. All standardized scores are adjusted for age and additional adjustments for demographic factors such as education were performed where applicable.    PRE-MORBID ABILITY: Premorbid abilities were estimated within the average range based on single word reading ability and demographic factors including sex, ethnicity, education, occupation, and geographic region.  GENERAL COGNITIVE STATUS: Orientation was within expectation for place. She was not fully oriented to general personal information (incorrectly stating her age as  73 ), or time (3 days off on the date and 2 days off on the day  of the week). She was able to name the current [place] President but could not name any other recent [place] Presidents. Within the practical domain, performance was below expectation on a measure of conceptual understanding of issues pertaining to health and safety. Her score was consistent with individuals functioning at a low level of independence in the community.  ATTENTION/EXECUTIVE FUNCTIONS: Immediate auditory attention and working memory performances were average. Verbal abstract reasoning performance was low average. Visual reasoning through pattern identification was average. Performance on a test of set-shifting/cognitive flexibility was low average with 1 error. Response inhibition performance was below average. Copy of a complex figure was below average and notable for a disorganized and piecemeal approach. Psychomotor processing speed performances were average.  LANGUAGE: Confrontation naming performance was average. Letter-cue verbal fluency performance was average. Semantic verbal fluency performance was low average.   VISUOSPATIAL PROCESSING: Performance on a spatial perception task requiring judgement of angled lines was low average. Copy of increasingly intricate designs was high average. Copy of a complex figure was below average and notable for extra, missing, and misplaced lines; however, the overall figural gestalt was generally preserved.  LEARNING AND MEMORY: Initial encoding of a word list over multiple learning trials was low average. Delayed retrieval of the list was exceptionally low with no words recalled. Recognition of the word list was exceptionally low with several false positive responses. Initial encoding of contextualized verbal information in the form of short stories was below average and delayed retrieval was exceptionally low with no details recalled. Recognition of story details was exceptionally low. Encoding of visual information was below average and delayed retrieval was  exceptionally low with no figural details recalled. Recognition among distractors was normatively low average but notable for only 2/7 correct responses.    PSYCHOLOGICAL AND BEHAVIORAL: She endorsed mild symptoms of anxiety and minimal symptoms of depression on self-report questionnaires.   PERFORMANCE VALIDITY: Performance on neuropsychological tests is dependent upon a number of factors, including sufficient engagement and motivation, to reliably establish an examinee s level of cognitive functioning.  Based upon observations made throughout the evaluation, the patient did not appear to deliberately perform in a suboptimal manner and demonstrated good frustration tolerance on cognitively challenging tasks. Score on an imbedded index of performance validity was in the valid range. Overall, test results are believed to accurately represent the patient s current neurocognitive status.    BEHAVIORAL OBSERVATIONS  Presented early and was accompanied by her son  Alert, attentive and focused while undergoing testing  Appearance: Well-groomed, casually dressed  Gait/Posture: No abnormalities noted  Sensorimotor: No abnormalities noted  Behavior: Cooperative, pleasant, no behavioral abnormalities noted  Speech: Fluent, articulate, normal rate, prosody, and volume; no conversational word finding difficulty Thought process: Logical, linear, and goal-directed  Thought content: Logical, appropriate; son occasionally had to correct information during interview (e.g., time at current job, living situation, etc.)  Affect: Broad, responsive, consistent with reported mood; good eye contact  Mood: Euthymic  Insight and Judgment: Intact  Approach to testing: Efficient, deliberate; good frustration on cognitively demanding tasks  Rapport: Easily established and maintained      Activities included in this evaluation: CPT Code #Units Time (min)   Psychiatric diagnostic interview 11486 1 --   Test evaluation services by professional;  first hour. 64107 1 140   Test evaluation services by professional, additional hour (+) [id number] 1    Test administration and scoring by technician, first 30 mins [id number]   Test administration and scoring by technician, additional 30 mins (+) [id number]    Dx: G31.84        Again, thank you for allowing me to participate in the care of your patient.      Sincerely,    ANANDA BROUSSARD, PhD

## 2023-05-15 NOTE — NURSING NOTE
The patient was seen for neuropsychological evaluation at the request of Dr. Alen Vega, for the purposes of diagnostic clarification and treatment planning. 184 minutes of test administration and scoring were provided by this writer, Malick Power. Please see Dr. Mayela Marr's report for a full interpretation of the findings.

## 2023-05-16 NOTE — PROGRESS NOTES
Provider: CE      Tech: STEFANO      Patient Name: Francisca Ramírez      : [birth year]      MRN: [id number]      HUERTA: 5/15/23      Age: 72      Education: 12      Ethnicity: C      Handedness: Right      Station: OP             NEUROPSYCHOLOGICAL TESTS RAW SCORE STANDARDIZED SCORE* DESCRIPTIVE RANGE**   PREMORBID ABILITY       WAIS-IV ACS Test of Premorbid Functioning (Estimated FSIQ) 36 SS= 96 Average     Estimated FSIQ = 97 Average          ATTENTION AND EXECUTIVE FUNCTIONS RAW SCORE STANDARDIZED SCORE* DESCRIPTIVE RANGE**   Wechsler Adult Intelligence Scale - 4th Edition (WAIS-IV)      Digit Span Forward 9 ss= 9 Average   Digit Span Backward 5 ss= 6 Low Average   Digit Span Sequencing 6 ss= 9 Average   Digit Span Total 20 ss= 8 Average   Coding 51 ss= 10 Average   Similarities 17 ss= 7 Low Average   Matrix Reasoning 8 ss= 8 Average   Trail Making Test (Time/errors)        Part A s,r,e 38/0 TS= 50 Average   Part B s,r,e 145/1 TS= 41 Low Average   Stroop       Word e 72 TS= 32 Below Average   Color e 52 TS= 34 Below Average   Color/Word e 15 TS= 34 Below Average   Interference e -14 TS= 36 Below Average   Independent Living Scales (ILS)       Health and Safety 26 TS= 26 Low          LANGUAGE RAW SCORE STANDARDIZED SCORE* DESCRIPTIVE RANGE**   Mora Naming Test s,r,e 50 TS= 44 Average   Letter-Cue Verbal Fluency s,r,e 13-9-16 (38) TS= 50 Average   Animal Fluency s,r,e 12 TS=  37 Low Average          VISUOSPATIAL PROCESSING RAW SCORE STANDARDIZED SCORE* DESCRIPTIVE RANGE**   Marcio Complex Figure Test (RCFT) Copy 25.5 %ile= 2-5 Below Average   Repeatable Battery for the Assessment of Neuropsychological Status (RBANS; Form A)    Line Orientation 13 %ile= 10-16 Low Average   WMS-IV Visual Reproduction Copy 43 %= >75 High Average          LEARNING & MEMORY RAW SCORE STANDARDIZED SCORE* DESCRIPTIVE RANGE**   Wechsler Memory Scale-4th Edition (WMS-IV)       Logical Memory I 17 ss= 5 Below Average   Logical Memory II 0 ss= 1  Exceptionally Low   Logical Memory Recognition 10 %= <=2 Exceptionally Low   Visual Reproduction I 20 ss= 5 Below Average   Visual Reproduction II 0 ss= 1 Exceptionally Low   Visual Reproduction Recognition 2 %= 10-16 Low Average   Martin Verbal Learning Test - Revised (HVLT-R; Form 1)      Total Trials 1-3 5-6-6 (17) TS= 38 Low Average   Delayed Recall 0 TS= <=20 Exceptionally Low   Retention (%) 0% TS= <=20 Exceptionally Low   Recognition Hits 12 --     Recognition False Alarms 9 --     Recognition Discriminability 3 TS= <=20 Exceptionally Low          PSYCHOLOGICAL & BEHAVIORAL SYMPTOMS RAW SCORE STANDARDIZED SCORE* DESCRIPTIVE RANGE**   Geriatric Depression Scale (GDS) 1 --  Minimal   Geriatric Anxiey Scale (GAS)       Somatic 5 --  Minimal   Cognitive 4 --  Mild   Affective 3 --  Minimal   Total 12 --  Mild          PERFORMANCE VALIDITY RAW SCORE   DESCRIPTIVE RANGE**   RDS 7   Valid Range          * All standardized scores are adjusted for age. Superscripts denote additional adjustment for (s)ex, (r)ace/ethnicity, (l)anguage, and/or (e)ducation.   ** Descriptive ranges are based on American Academy of Clinical Neuropsychology (2020) consensus guidelines, or test manuals where appropriate.    WNL = within normal limits. DC = discontinued due to patient s inability to complete the test.      Standardized scores: TS = T-score; mean = 50, standard deviation =10; z = z-score; mean = 0, standard deviation = 1; ss = scaled score; mean = 10, standard deviation = 3; MAS = [place] Older Adult Normative Study age adjusted scaled score; mean = 10, standard deviation = 3; SS = standard score; mean = 100, standard deviation = 15.

## 2023-05-16 NOTE — PROGRESS NOTES
NEUROPSYCHOLOGICAL EVALUATION  **CONFIDENTIAL**    **This is a medical document intended for communication with the referring provider. It is written in medical language and may contain abbreviations or verbiage that are unfamiliar. It may appear blunt or direct. This report and the results within are not intended to be interpreted in isolation without consultation with your medical provider. **      Name: Francisca Ramírez Education: 12 years    (age): 1950 (72 years) HUERTA:  5/15/2023   Referral: Alen Vega MD  Department of Family Medicine Handedness:  MRN (Epic): Right  [id number]     IDENTIFYING INFORMATION AND REASON FOR REFERRAL   Ms. Ramírez is a 72-year-old, right-handed, White female with a history of depression. This evaluation was requested to provide a comprehensive assessment of her current neuropsychological status to inform treatment planning.     IMPRESSION  See below for relevant background information and detailed test results. See separate abstract for supporting documentation including a list of neuropsychological measures and test scores.    Ms. Ramírez s neuropsychological profile revealed variable encoding but severely impaired retrieval on tests of verbal and visual memory with no details recalled after a delay across memory tests, and impaired verbal recognition notable for a false-positive response bias. She also exhibited difficulty on tests of planning/organization, response inhibition, and knowledge of health and safety. Additionally, she was not fully orientated to person (incorrectly stating her age) or time (3 days off on the date and 2 days off on the day of the week). Performance was within expectation across other cognitive tests including immediate auditory attention and working memory, speeded processing, language, basic visuospatial processing, abstract reasoning, and mental flexibility. Assessment of current psychological and emotional status revealed mild symptoms of  anxiety and the absence of clinically significant depressive mood symptoms.    Overall, Ms. Ramírez exhibited significant difficulty on tests of verbal and visual memory characterized by amnestic rapid forgetting of information with low recognition scores, and difficulties on select tests of executive functioning. While findings suggest Ms. Ramírez may experience cognitive inefficiencies in her day-to-day life, she and her son reported limited functional difficulties at present. Importantly, she has several cognitive strengths that will be beneficial to her in order to compensate for any cognitive weaknesses. Taken together, observed test performance and reported levels of day-to-day functioning suggest a diagnosis of amnestic mild cognitive impairment (i.e., mild neurocognitive disorder). These findings are suggestive of an underlying neurodegenerative process and ongoing monitoring of her cognitive and functional status over time is recommended.       DIAGNOSTIC IMPRESSIONS (ICD-10)  ? 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 Results from the present evaluation revealed significant memory impairment. It is recommended that trusted family provide periodic oversight and monitoring for complex activities of daily living to ensure her safety and wellbeing are maintained.    2. Family members and providers may note that when communicating with Ms. Ramírez, she may benefit from novel information being broken down in small chunks and repeated across time to facilitate recall.  Additionally, we encourage Ms. Ramírez to be accompanied to all medical appointments to ensure the accurate exchange and recollection of information.  3. Ms. Ramírez is encouraged to continue to live a healthy lifestyle that promotes brain health, including getting appropriate exercise (as advised by her healthcare providers), getting regular sleep, and eating healthy.    4. We recommend that Ms. Ramírez  continue to maintain a socially active lifestyle. She is likely to benefit from continuing to engage in social activities that she enjoys as these activities may offer cognitive stimulation, help to prevent feelings of depression, and provide emotional benefits that will maximize quality of life.   5. Ms. Ramírez may wish to discuss pharmacological treatment options for cognitive decline with her prescribing physicians.  6. Ms. Ramírez is encouraged to utilize the following behavioral strategies to maximize cognitive functioning in her daily life:   -Eliminate distraction. Eliminating environmental distracters can facilitate attention and memory performance. For example, turning off music or the television while having a conversation, so that all of your attention is focused on the task at hand.  -Work on decreasing interference from intrusive thoughts. When intrusive thoughts begin to interfere with your thinking, do not concentrate on them. Instead, observe them passively and calmly redirect your attention back to task.   -Engage in calming activities that increase focus on the present. Incorporating mindfulness techniques such as meditation, relaxation, yoga, and moderate cardiovascular exercise, into your daily routine will help keep you present-oriented and consequently improve attention and memory. Apps like Calm, and TrackerSphere, have guided medications and mindfulness tools (search  mindfulness meditation ).  -Pay mindful attention. You may find that you need to make a conscious effort to pay attention to conversations or when learning new information. When attention is not focused, it is difficult for the brain to learn new information. Thus, making a mindful effort to pay attention as you go through daily tasks will assist and facilitate memory recall later on.  -Allow for time. Take the time needed to learn and recall information. Some people tend to worry if they can't immediately recall something.  Instead, you should take time to express a thought, or complete a task rather than be critical or harsh on yourself.  -Use external aids to increase structure in daily life. Ongoing use of compensatory strategies such as notes, lists, and a centralized calendar are supported. Tools such as smart phones with alarms and reminders are helpful in bringing structure to daily activities.   7. The current evaluation will serve as an objective cognitive baseline against which results of future evaluations may be compared. Ms. Ramírez may be referred for a follow-up neuropsychological evaluation in 12 months to objectively assess neurocognitive change.     Thank you for the opportunity to participate in Ms. Ramírez s care.  These findings and recommendations were reviewed with the patient and her son, Wilmar, in a separate feedback session on 5/16/2023 and all their questions were answered. If you have any questions regarding this evaluation, please do not hesitate to contact me.       Mayela Marr, Ph.D.,   Clinical Neuropsychologist    RELEVANT BACKGROUND INFORMATION AND SUPPORTING DOCUMENTATION  Gathered from a clinical interview with the patient and her son, Wilmar, and reviews of electronic medical record.     History of Presenting Problem(s)  Ms. Ramírez presented with a history of depression. She reported memory complaints for the past 1-1.5 years that have remained relatively stable over time. Specifically, she noted some days are better than others and she struggles to recall unimportant conversations, particularly when stressed. Cues are occasionally helpful. She noted she has more difficulty paying attention to conversations and she may be thinking of other things. She denied trouble remembering important conversations. 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 Her son noted cognitive changes in the past couple years characterized by forgetting  conversations and repeating questions. He corroborated that some days seem better than others but noted that his brother, who she lives with, believes her cognition is declining. He noted that she has always struggled with organization, and he has recently began assisting with managing finances as a result. She is otherwise independent with activities of daily living including managing medical appointments, medications, meal preparation, driving, and basic self-care. Physically, she reported 1 backwards fall last year during which she hit the back of her head without LOC or persisting symptoms. She denied additional falls or other sensorimotor disturbances. Emotionally, she denied any symptoms of depression, anxiety, or psychological distress recently.     Neurodiagnostic Findings   No neuroimaging available    Medical History  ? Microscopic colitis  ? Benign neoplasm of skin  ? Arthritis  ? Denied history of stroke, seizure, or head injury with LOC or persisting symptoms    Health Behaviors   ? Sleep: ~8 hours of cumulative sleep nightly; denied sleep disturbance; denied snoring, gasping arousals, or parasomnic behaviors  ? Exercise: Walking around the store at work; denied exercise outside of work  ? Pain: Knee pain when walking; denied pain during this evaluation    Psychiatric History  ? Ms. Ramírez reported current mood is  fine   ? History is notable for depression that has been well managed with medication for many years; she denied any recent symptoms of depression  ? She otherwise denied history of hypomanic or manic mood symptoms, excessive anxiety or uncontrollable worry, compulsive behaviors, alteration of sensory perceptual processes or disordered thought.  ? Suicidality/ Self-harm: She denied any suicidal ideation, plan, or intent  ? Psychiatric treatment: Currently prescribed venlafaxine    Substance Use History  ? Alcohol: None  ? Nicotine: None  ? Cannabis: None  ? Problematic Substance Use:  Denied    Current Medications (per patient report)  venlafaxine  simvastatin  vitamin D    Educational, & Occupational History  ? Childhood behavioral / emotional / academic problems: Reported math was difficult, but she did not receive any assistance or accommodations  ? Native Language: English  ? Education: Described self as an average student; graduated high school on time; may have taken a couple of courses in vocational school  ? 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 Born in [place] and raised in [place], [place]  ? Marital:   ? Children: 2 sons  ? Housing: Lives with son and granddaughter in a [facility]  ? Psychosocial support: Strong social support network of family and neighbors    Family History  ? No known history of dementia or neurological disorders    RESULTS  See separate abstract for list of neuropsychological measures and test scores. Descriptive ranges are based on American Academy of Clinical Neuropsychology (2020) consensus guidelines, or test manuals where appropriate. All standardized scores are adjusted for age and additional adjustments for demographic factors such as education were performed where applicable.    PRE-MORBID ABILITY: Premorbid abilities were estimated within the average range based on single word reading ability and demographic factors including sex, ethnicity, education, occupation, and geographic region.  GENERAL COGNITIVE STATUS: Orientation was within expectation for place. She was not fully oriented to general personal information (incorrectly stating her age as  73 ), or time (3 days off on the date and 2 days off on the day of the week). She was able to name the current [place] President but could not name any other recent [place] Presidents. Within the practical domain, performance was below expectation on a measure of conceptual understanding of issues pertaining to health  and safety. Her score was consistent with individuals functioning at a low level of independence in the community.  ATTENTION/EXECUTIVE FUNCTIONS: Immediate auditory attention and working memory performances were average. Verbal abstract reasoning performance was low average. Visual reasoning through pattern identification was average. Performance on a test of set-shifting/cognitive flexibility was low average with 1 error. Response inhibition performance was below average. Copy of a complex figure was below average and notable for a disorganized and piecemeal approach. Psychomotor processing speed performances were average.  LANGUAGE: Confrontation naming performance was average. Letter-cue verbal fluency performance was average. Semantic verbal fluency performance was low average.   VISUOSPATIAL PROCESSING: Performance on a spatial perception task requiring judgement of angled lines was low average. Copy of increasingly intricate designs was high average. Copy of a complex figure was below average and notable for extra, missing, and misplaced lines; however, the overall figural gestalt was generally preserved.  LEARNING AND MEMORY: Initial encoding of a word list over multiple learning trials was low average. Delayed retrieval of the list was exceptionally low with no words recalled. Recognition of the word list was exceptionally low with several false positive responses. Initial encoding of contextualized verbal information in the form of short stories was below average and delayed retrieval was exceptionally low with no details recalled. Recognition of story details was exceptionally low. Encoding of visual information was below average and delayed retrieval was exceptionally low with no figural details recalled. Recognition among distractors was normatively low average but notable for only 2/7 correct responses.    PSYCHOLOGICAL AND BEHAVIORAL: She endorsed mild symptoms of anxiety and minimal symptoms of  depression on self-report questionnaires.   PERFORMANCE VALIDITY: Performance on neuropsychological tests is dependent upon a number of factors, including sufficient engagement and motivation, to reliably establish an examinee s level of cognitive functioning.  Based upon observations made throughout the evaluation, the patient did not appear to deliberately perform in a suboptimal manner and demonstrated good frustration tolerance on cognitively challenging tasks. Score on an imbedded index of performance validity was in the valid range. Overall, test results are believed to accurately represent the patient s current neurocognitive status.    BEHAVIORAL OBSERVATIONS  ? Presented early and was accompanied by her son  ? Alert, attentive and focused while undergoing testing  ? Appearance: Well-groomed, casually dressed  ? Gait/Posture: No abnormalities noted  ? Sensorimotor: No abnormalities noted  ? Behavior: Cooperative, pleasant, no behavioral abnormalities noted  ? Speech: Fluent, articulate, normal rate, prosody, and volume; no conversational word finding difficulty Thought process: Logical, linear, and goal-directed  ? Thought content: Logical, appropriate; son occasionally had to correct information during interview (e.g., time at current job, living situation, etc.)  ? Affect: Broad, responsive, consistent with reported mood; good eye contact  ? Mood: Euthymic  ? Insight and Judgment: Intact  ? Approach to testing: Efficient, deliberate; good frustration on cognitively demanding tasks  ? Rapport: Easily established and maintained      Activities included in this evaluation: CPT Code #Units Time (min)   Psychiatric diagnostic interview 42027 1 --   Test evaluation services by professional; first hour. 10141 1 140   Test evaluation services by professional, additional hour (+) 42232 1    Test administration and scoring by technician, first 30 mins [id number] 1 184   Test administration and scoring by technician,  additional 30 mins (+) [id number] 5    Dx: G31.84

## 2023-05-17 NOTE — CONFIDENTIAL NOTE
Based on the results of neuropsych testing,  I would like her to see me and discuss about medicine option on the current health issue. Please help her to schedule VV on Monday

## 2023-05-17 NOTE — PROGRESS NOTES
Called pt, and LVM requesting callback to schedule appointment.    Cheryl MERCER    [facility]

## 2023-06-01 ENCOUNTER — HEALTH MAINTENANCE LETTER (OUTPATIENT)
Age: 73
End: 2023-06-01

## 2023-06-13 ENCOUNTER — TELEPHONE (OUTPATIENT)
Dept: FAMILY MEDICINE | Facility: CLINIC | Age: 73
End: 2023-06-13
Payer: COMMERCIAL

## 2023-06-13 NOTE — TELEPHONE ENCOUNTER
Needs of attention regarding:  -Breast Cancer Screening  -Wellness (Physical) Visit     Health Maintenance Topics with due status: Overdue       Topic Date Due    MAMMO SCREENING 01/22/2022    PHQ-2 (once per calendar year) 01/01/2023    COVID-19 Vaccine 04/20/2023    MEDICARE ANNUAL WELLNESS VISIT 04/26/2023       Communication:  Patient called - message left

## 2023-06-13 NOTE — LETTER
June 19, 2023      Francisca Ramírez  [address]  [place] [address]        Dear Francisca,    I care about your health and have reviewed your health plan. I have reviewed your medical conditions, medication list, and lab results and am making recommendations based on this review, to better manage your health.    You are in particular need of attention regarding:  -Breast Cancer Screening  -Wellness (Physical) Visit     I am recommending that you:  -schedule a WELLNESS (Physical) APPOINTMENT with me.   I will check fasting labs the same day - nothing to eat except water and meds for 8-10 hours prior. (If you go elsewhere for Wellness visits then please disregard this reminder.)    -schedule a MAMMOGRAM which is due.    1 in 8 women will develop invasive breast cancer during her lifetime and it is the most common non-skin cancer in American women.  EARLY detection, new treatments, and a better understanding of the disease have increased survival rates - the 5 year survival rate in the 1960s was 63% and today it is close to 90%.    If you are under/uninsured, we recommend you contact the [facility]. They offer mammograms at no charge or on a sliding fee charge. You can schedule with them at [phone]. Please have them send us the results.      Please disregard this reminder if you have had this exam elsewhere within the last year.  It would be helpful for us to have a copy of your mammogram report in your file so that we can best coordinate your care - please contact us with when your test was done so we can update your record.               Here is a list of Health Maintenance topics that are due now or due soon:  Health Maintenance Due   Topic Date Due    MAMMO SCREENING  01/22/2022    PHQ-2 (once per calendar year)  01/01/2023    COVID-19 Vaccine (5 - Pfizer series) 04/20/2023    MEDICARE ANNUAL WELLNESS VISIT  04/26/2023        Please call us (or use [employer]) to address the above recommendations.      Thank you for trusting [facility] and we appreciate the opportunity to serve you.  We look forward to supporting your healthcare needs in the future.    Healthy Regards,      Alen Vega MD.

## 2023-06-22 ENCOUNTER — TELEPHONE (OUTPATIENT)
Dept: FAMILY MEDICINE | Facility: CLINIC | Age: 73
End: 2023-06-22

## 2023-06-22 ENCOUNTER — OFFICE VISIT (OUTPATIENT)
Dept: FAMILY MEDICINE | Facility: CLINIC | Age: 73
End: 2023-06-22
Payer: COMMERCIAL

## 2023-06-22 VITALS
BODY MASS INDEX: 26.34 KG/M2 | RESPIRATION RATE: 18 BRPM | DIASTOLIC BLOOD PRESSURE: 72 MMHG | HEART RATE: 69 BPM | WEIGHT: 183.6 LBS | TEMPERATURE: 98.6 F | OXYGEN SATURATION: 97 % | SYSTOLIC BLOOD PRESSURE: 130 MMHG

## 2023-06-22 DIAGNOSIS — B35.1 ONYCHOMYCOSIS: ICD-10-CM

## 2023-06-22 DIAGNOSIS — H61.21 EXCESSIVE EAR WAX, RIGHT: Primary | ICD-10-CM

## 2023-06-22 PROCEDURE — 99213 OFFICE O/P EST LOW 20 MIN: CPT | Mod: 25 | Performed by: FAMILY MEDICINE

## 2023-06-22 PROCEDURE — 69210 REMOVE IMPACTED EAR WAX UNI: CPT | Mod: RT | Performed by: FAMILY MEDICINE

## 2023-06-22 RX ORDER — CICLOPIROX 80 MG/ML
SOLUTION TOPICAL
Qty: 6.6 ML | Refills: 4 | Status: SHIPPED | OUTPATIENT
Start: 2023-06-22 | End: 2024-01-08

## 2023-06-22 ASSESSMENT — PAIN SCALES - GENERAL: PAINLEVEL: NO PAIN (0)

## 2023-06-22 NOTE — PROGRESS NOTES
Assessment & Plan     (H61.21) Excessive ear wax, right  (primary encounter diagnosis)  Comment:   Plan: [place] REMOVAL IMPACTED CERUMEN IRRIGATION/LVG         UNILAT   Impacted wax  is noted.  Wax is removed by manual debridement using  ear curet but it was hard wax, so it was  followed by syringing, with good results . Tm looked normal pt also reported improvement I her sx    Instructions for home care to prevent wax buildup are given.      (B35.1) Onychomycosis  Comment: both big toe   Plan: ciclopirox (PENLAC) 8 % external solution    Discussed cares concerns and treatment options.    Talked about stubborn nature of this condition,  topical vs oral antifungal treatment and need to watch labs with that etc   Willing to try penlac. Pros/ cons discussed   Cares and  treatment discussed.  follow up in 3 months , sooner if problem     Patient expressed understanding and agreement with treatment plan. All patient's questions were answered, will let me know if has more later.  Medications: Rx's: Reviewed the potential side effects/complications of medications prescribed.       Mel Lazcano MD  [facility]    Favian Espinosa is a 72 year old, presenting for the following health issues:  Ear Problem        6/22/2023     9:12 AM   Additional Questions   Roomed by Antoni   Accompanied by Son/ Dell     History of Present Illness       Reason for visit:  Ear problems  Symptom onset:  1-2 weeks ago  Symptoms include:  Ear pain,feel like its clogged  Symptom intensity:  Mild  Symptom progression:  Staying the same  Had these symptoms before:  No  What makes it worse:  Na  What makes it better:  Na    Francisca Ramírez eats 2-3 servings of fruits and vegetables daily.Francisca Ramírez consumes 1 sweetened beverage(s) daily.Francisca Ramírez exercises with enough effort to increase Francisca Ramírez's heart rate 10 to 19 minutes per day.  Francisca Ramírez exercises with enough effort to  increase Francisca Ramírez's heart rate 3 or less days per week.   Francisca Ramírez is taking medications regularly.     RT ear, Just feeling plugged. no pain in ear . Hearing is ok.  no ringing.      no other uri or allergy elated sx or  recent illness.  Has had no issue with  ear wax in the past. no recent flying or swimming etc           additional problems for provider to add    Also concerned with toe nail fungus,  Mostly involves  both big toes.   ongoing problem for many years.  no pain  but nail is looking worse and deformed , so concerned and wants to try some treatment     Review of Systems   Constitutional, HEENT, cardiovascular, pulmonary, GI, , musculoskeletal, neuro, skin, endocrine and psych systems are negative, except as otherwise noted.      Objective    There were no vitals taken for this visit.  There is no height or weight on file to calculate BMI.  Physical Exam   GENERAL: healthy, alert and no distress  EYES: Eyes grossly normal to inspection, PERRL and conjunctivae and sclerae normal  HENT: ear - rt ear canal filled with hard wax and not able to visualized tm  - left  canal and TM's normal, oropharynx clear and oral mucous membranes moist  NECK: no adenopathy,   RESP: lungs clear to auscultation - no rales, rhonchi or wheezes  CV: regular rate and rhythm, normal S1 S2, no S3 or S4,   MS: feet- both feet big toes looks deformed and discolored grayish  especially on distal half and has a very narrow clear margin close to nail base along periungual area

## 2023-06-22 NOTE — TELEPHONE ENCOUNTER
Prior Authorization Retail Medication Request    Medication/Dose: ciclopirox (PENLAC) 8 % external solution  ICD code (if different than what is on RX):    Previously Tried and Failed:    Rationale:      Insurance Name:  na  Insurance ID:  na      Pharmacy Information (if different than what is on RX)  Name:  same  Phone:  same

## 2023-06-22 NOTE — PATIENT INSTRUCTIONS
Take medications as directed.  Cares and symptomatic cares discussed   Follow up  in 3 months but sooner if problem or concern

## 2023-06-23 ENCOUNTER — TELEPHONE (OUTPATIENT)
Dept: FAMILY MEDICINE | Facility: CLINIC | Age: 73
End: 2023-06-23
Payer: COMMERCIAL

## 2023-06-23 DIAGNOSIS — B35.1 ONYCHOMYCOSIS: Primary | ICD-10-CM

## 2023-06-23 RX ORDER — PRENATAL VIT 91/IRON/FOLIC/DHA 28-975-200
COMBINATION PACKAGE (EA) ORAL 2 TIMES DAILY
Qty: 30 G | Refills: 1 | Status: SHIPPED | OUTPATIENT
Start: 2023-06-23 | End: 2024-01-08

## 2023-06-23 NOTE — TELEPHONE ENCOUNTER
Unfortunately lot of insurance do not cover toe nail  fungus treatments( I had told pt about this during appointment)  , bc they consider it cosmetic.     Generic alternate may be cheaper so ask pharmacy for that, if pt wants to treat it. ( PA may not work any way) other alternate is OTC Lamisil ( again generic is fine too) script faxed . They can check with pharmacy and can use whichever is more affordable for pt if she really wants to try

## 2023-06-23 NOTE — TELEPHONE ENCOUNTER
Spoke with the patient's son Wilmar. He was calling about treatment for an ear infection. That is what he thought the patient said.  No C2C on file. He is bringing patient in on Tuesday and will assist patient with filling out consent to communicate. Liliana Iyer RN

## 2023-06-23 NOTE — TELEPHONE ENCOUNTER
Patient's son Wilmar calling to request alternate script be sent in for patient's ear symptoms. Wilmar stated previous medication is not covered under patient's insurance and they would not like to wait for the PA to be reviewed. Wilmar and patient are requesting an alternative prescription for symptoms.    Routing to ordering provider for review.

## 2023-06-27 ENCOUNTER — ANCILLARY PROCEDURE (OUTPATIENT)
Dept: MAMMOGRAPHY | Facility: CLINIC | Age: 73
End: 2023-06-27
Payer: COMMERCIAL

## 2023-06-27 DIAGNOSIS — Z12.31 VISIT FOR SCREENING MAMMOGRAM: ICD-10-CM

## 2023-06-27 PROCEDURE — 77063 BREAST TOMOSYNTHESIS BI: CPT | Mod: TC | Performed by: STUDENT IN AN ORGANIZED HEALTH CARE EDUCATION/TRAINING PROGRAM

## 2023-06-27 PROCEDURE — 77067 SCR MAMMO BI INCL CAD: CPT | Mod: TC | Performed by: STUDENT IN AN ORGANIZED HEALTH CARE EDUCATION/TRAINING PROGRAM

## 2023-06-27 NOTE — TELEPHONE ENCOUNTER
PA Initiation    Medication: CICLOPIROX 8 % EX SOLN  Insurance Company: [employer] - Phone [phone] Fax [phone]  Pharmacy Filling the Rx[id number] [place] [facility] - [place] [facility] - [address] [place] [address]  Filling Pharmacy Phone: [phone]  Filling Pharmacy Fax:    Start Date: 6/27/2023

## 2023-06-28 NOTE — TELEPHONE ENCOUNTER
Prior Authorization Approval    Medication: CICLOPIROX 8 % EX SOLN  Authorization Effective Date: 2/1/2023  Authorization Expiration Date: 12/31/2023  Approved Dose/Quantity: 6.6/30Reference #: [id number]   Insurance Company: [employer] - Phone [phone] Fax [phone]  Expected CoPay:       CoPay Card Available:      Financial Assistance Needed:   Which Pharm[facility] is filling the prescription[id number] [address]  Pharmacy Notified: Yes  Patient Notified: Yes

## 2023-07-23 DIAGNOSIS — E78.5 HYPERLIPIDEMIA LDL GOAL <130: ICD-10-CM

## 2023-07-24 RX ORDER — SIMVASTATIN 40 MG
TABLET ORAL
Qty: 30 TABLET | Refills: 1 | Status: SHIPPED | OUTPATIENT
Start: 2023-07-24 | End: 2023-08-24

## 2023-07-24 NOTE — TELEPHONE ENCOUNTER
Due for med check/CPE and fasting lab, 1 month and 1 refill supplies sent, please help her to schedule       Thx

## 2023-08-09 ENCOUNTER — OFFICE VISIT (OUTPATIENT)
Dept: FAMILY MEDICINE | Facility: CLINIC | Age: 73
End: 2023-08-09
Payer: COMMERCIAL

## 2023-08-09 VITALS
HEIGHT: 68 IN | WEIGHT: 189.4 LBS | HEART RATE: 85 BPM | DIASTOLIC BLOOD PRESSURE: 68 MMHG | SYSTOLIC BLOOD PRESSURE: 120 MMHG | BODY MASS INDEX: 28.7 KG/M2 | TEMPERATURE: 98.7 F | OXYGEN SATURATION: 96 % | RESPIRATION RATE: 18 BRPM

## 2023-08-09 DIAGNOSIS — R41.89 COGNITIVE IMPAIRMENT: ICD-10-CM

## 2023-08-09 DIAGNOSIS — H60.391 INFECTIVE OTITIS EXTERNA, RIGHT: ICD-10-CM

## 2023-08-09 DIAGNOSIS — M17.0 PRIMARY OSTEOARTHRITIS OF BOTH KNEES: ICD-10-CM

## 2023-08-09 DIAGNOSIS — Z00.01 ENCOUNTER FOR GENERAL ADULT MEDICAL EXAMINATION WITH ABNORMAL FINDINGS: Primary | ICD-10-CM

## 2023-08-09 LAB
ALBUMIN SERPL BCG-MCNC: 4.4 G/DL (ref 3.5–5.2)
ALP SERPL-CCNC: 65 U/L (ref 35–129)
ALT SERPL W P-5'-P-CCNC: 16 U/L (ref 0–70)
ANION GAP SERPL CALCULATED.3IONS-SCNC: 11 MMOL/L (ref 7–15)
AST SERPL W P-5'-P-CCNC: 32 U/L (ref 0–45)
BILIRUB SERPL-MCNC: 0.6 MG/DL
BUN SERPL-MCNC: 14.2 MG/DL (ref 8–23)
CALCIUM SERPL-MCNC: 9.8 MG/DL (ref 8.8–10.2)
CHLORIDE SERPL-SCNC: 104 MMOL/L (ref 98–107)
CHOLEST SERPL-MCNC: 191 MG/DL
CREAT SERPL-MCNC: 0.65 MG/DL (ref 0.51–1.17)
DEPRECATED HCO3 PLAS-SCNC: 27 MMOL/L (ref 22–29)
ERYTHROCYTE [DISTWIDTH] IN BLOOD BY AUTOMATED COUNT: 11.7 % (ref 10–15)
GFR SERPL CREATININE-BSD FRML MDRD: >90 ML/MIN/1.73M2
GLUCOSE SERPL-MCNC: 90 MG/DL (ref 70–99)
HCT VFR BLD AUTO: 43.3 % (ref 35–53)
HDLC SERPL-MCNC: 79 MG/DL
HGB BLD-MCNC: 14.3 G/DL (ref 11.7–17.7)
LDLC SERPL CALC-MCNC: 93 MG/DL
MCH RBC QN AUTO: 32.4 PG (ref 26.5–33)
MCHC RBC AUTO-ENTMCNC: 33 G/DL (ref 31.5–36.5)
MCV RBC AUTO: 98 FL (ref 78–100)
NONHDLC SERPL-MCNC: 112 MG/DL
PLATELET # BLD AUTO: 200 10E3/UL (ref 150–450)
POTASSIUM SERPL-SCNC: 4.4 MMOL/L (ref 3.4–5.3)
PROT SERPL-MCNC: 7.4 G/DL (ref 6.4–8.3)
RBC # BLD AUTO: 4.42 10E6/UL (ref 3.8–5.9)
SODIUM SERPL-SCNC: 142 MMOL/L (ref 136–145)
TRIGL SERPL-MCNC: 97 MG/DL
WBC # BLD AUTO: 6.5 10E3/UL (ref 4–11)

## 2023-08-09 PROCEDURE — 99213 OFFICE O/P EST LOW 20 MIN: CPT | Mod: 25 | Performed by: FAMILY MEDICINE

## 2023-08-09 PROCEDURE — 36415 COLL VENOUS BLD VENIPUNCTURE: CPT | Performed by: FAMILY MEDICINE

## 2023-08-09 PROCEDURE — 85027 COMPLETE CBC AUTOMATED: CPT | Performed by: FAMILY MEDICINE

## 2023-08-09 PROCEDURE — G0439 PPPS, SUBSEQ VISIT: HCPCS | Performed by: FAMILY MEDICINE

## 2023-08-09 PROCEDURE — 80061 LIPID PANEL: CPT | Performed by: FAMILY MEDICINE

## 2023-08-09 PROCEDURE — 80053 COMPREHEN METABOLIC PANEL: CPT | Performed by: FAMILY MEDICINE

## 2023-08-09 RX ORDER — DONEPEZIL HYDROCHLORIDE 5 MG/1
5 TABLET, FILM COATED ORAL AT BEDTIME
Qty: 90 TABLET | Refills: 1 | Status: SHIPPED | OUTPATIENT
Start: 2023-08-09 | End: 2023-12-07

## 2023-08-09 RX ORDER — NEOMYCIN SULFATE, POLYMYXIN B SULFATE, HYDROCORTISONE 3.5; 10000; 1 MG/ML; [USP'U]/ML; MG/ML
3 SOLUTION/ DROPS AURICULAR (OTIC) 4 TIMES DAILY
Qty: 10 ML | Refills: 0 | Status: SHIPPED | OUTPATIENT
Start: 2023-08-09 | End: 2024-01-08

## 2023-08-09 ASSESSMENT — ENCOUNTER SYMPTOMS
NAUSEA: 0
MYALGIAS: 1
FREQUENCY: 0
JOINT SWELLING: 0
COUGH: 0
SHORTNESS OF BREATH: 0
CONSTIPATION: 0
DYSURIA: 0
HEMATURIA: 0
DIZZINESS: 0
ARTHRALGIAS: 1
EYE PAIN: 0
CHILLS: 0
FEVER: 0
SORE THROAT: 0
ABDOMINAL PAIN: 0
PARESTHESIAS: 0
HEADACHES: 0
BREAST MASS: 0
HEARTBURN: 0
WEAKNESS: 1
DIARRHEA: 0
HEMATOCHEZIA: 0
NERVOUS/ANXIOUS: 1
PALPITATIONS: 0

## 2023-08-09 ASSESSMENT — ACTIVITIES OF DAILY LIVING (ADL): CURRENT_FUNCTION: NO ASSISTANCE NEEDED

## 2023-08-09 NOTE — PROGRESS NOTES
"The patient was provided with written information regarding signs of hearing loss.Answers submitted by the patient for this visit:  Annual Preventive Visit (Submitted on 8/9/2023)  Chief Complaint: Annual Exam:  In general, how would you rate your overall physical health?: good  Frequency of exercise:: 4-5 days/week  Do you usually eat at least 4 servings of fruit and vegetables a day, include whole grains & fiber, and avoid regularly eating high fat or \"junk\" foods? : Yes  Taking medications regularly:: Yes  Medication side effects:: None  Activities of Daily Living: no assistance needed  Home safety: no safety concerns identified  Hearing Impairment:: difficulty following a conversation in a noisy restaurant or crowded room  In the past 6 months, have you been bothered by leaking of urine?: No  abdominal pain: No  Blood in stool: No  Blood in urine: No  chest pain: No  chills: No  congestion: No  constipation: No  cough: No  diarrhea: No  dizziness: No  ear pain: Yes  eye pain: No  nervous/anxious: Yes  fever: No  frequency: No  genital sores: No  headaches: No  hearing loss: No  heartburn: No  arthralgias: Yes  joint swelling: No  peripheral edema: No  mood changes: No  myalgias: Yes  nausea: No  dysuria: No  palpitations: No  Skin sensation changes: No  sore throat: No  urgency: No  rash: No  shortness of breath: No  visual disturbance: No  weakness: Yes  pelvic pain: No  vaginal bleeding: No  vaginal discharge: No  tenderness: No  breast mass: No  breast discharge: No  impotence: No  penile discharge: No  In general, how would you rate your overall mental or emotional health?: good  Additional concerns today:: No  Exercise outside of work (Submitted on 8/9/2023)  Chief Complaint: Annual Exam:  Duration of exercise:: 30-45 minutes    "

## 2023-08-09 NOTE — PROGRESS NOTES
"SUBJECTIVE:   Francisca is a 72 year old who presents for Preventive Visit.      Are you in the first 12 months of your Medicare coverage?  No    Healthy Habits:     In general, how would you rate your overall health?  Good    Frequency of exercise:  4-5 days/week    Duration of exercise:  30-45 minutes    Do you usually eat at least 4 servings of fruit and vegetables a day, include whole grains    & fiber and avoid regularly eating high fat or \"junk\" foods?  Yes    Taking medications regularly:  Yes    Medication side effects:  None    Ability to successfully perform activities of daily living:  No assistance needed    Home Safety:  No safety concerns identified    Hearing Impairment:  Difficulty following a conversation in a noisy restaurant or crowded room    In the past 6 months, have you been bothered by leaking of urine?  No    In general, how would you rate your overall mental or emotional health?  Good    Additional concerns today:  No        Have you ever done Advance Care Planning? (For example, a Health Directive, POLST, or a discussion with a medical provider or your loved ones about your wishes): Yes, advance care planning is on file.       Fall risk  Fallen 2 or more times in the past year?: No  Any fall with injury in the past year?: No    Cognitive Screening   1) Repeat 3 items (Leader, Season, Table)    2) Clock draw: ABNORMAL Placed hands in wrong spot  3) 3 item recall: Recalls 1 object   Results: ABNORMAL clock, 1-2 items recalled: PROBABLE COGNITIVE IMPAIRMENT, **INFORM PROVIDER**    Mini-CogTM Copyright ARISTEO Zarate. Licensed by the author for use in [facility]; reprinted with permission (kale@[facility]). All rights reserved.      Do you have sleep apnea, excessive snoring or daytime drowsiness? : no    Reviewed and updated as needed this visit by clinical staff                  Reviewed and updated as needed this visit by Provider                 Social History     Tobacco Use    Smoking " status: Never    Smokeless tobacco: Never   Substance Use Topics    Alcohol use: Yes     Comment: two or more on occassion             8/9/2023     2:01 PM   Alcohol Use   Prescreen: >3 drinks/day or >7 drinks/week? No     Do you have a current opioid prescription? No  Do you use any other controlled substances or medications that are not prescribed by a provider? None        Current providers sharing in care for this patient include:   Patient Care Team:  Alen Vega MD as PCP - General (Family Medicine)  Alen Vega MD as Assigned PCP  Mayela Marr, PhD as Assigned Behavioral Health Provider    The following health maintenance items are reviewed in Epic and correct as of today:  Health Maintenance   Topic Date Due    COVID-19 Vaccine (5 - Pfizer series) 04/20/2023    MEDICARE ANNUAL WELLNESS VISIT  04/26/2023    INFLUENZA VACCINE (1) 09/01/2023    ANNUAL REVIEW OF HM ORDERS  12/20/2023    FALL RISK ASSESSMENT  08/09/2024    COLORECTAL CANCER SCREENING  10/16/2024    MAMMO SCREENING  06/27/2025    LIPID  01/12/2026    ADVANCE CARE PLANNING  01/12/2026    DTAP/TDAP/TD IMMUNIZATION (3 - Td or Tdap) 01/07/2029    DEXA  12/05/2031    HEPATITIS C SCREENING  Completed    PHQ-2 (once per calendar year)  Completed    Pneumococcal Vaccine: 65+ Years  Completed    ZOSTER IMMUNIZATION  Completed    IPV IMMUNIZATION  Aged Out    MENINGITIS IMMUNIZATION  Aged Out     BP Readings from Last 3 Encounters:   08/09/23 120/68   06/22/23 130/72   12/20/22 134/68    Wt Readings from Last 3 Encounters:   08/09/23 85.9 kg (189 lb 6.4 oz)   06/22/23 83.3 kg (183 lb 9.6 oz)   12/20/22 78.9 kg (174 lb)                  Patient Active Problem List   Diagnosis    Acute reaction to stress    HYPERLIPIDEMIA LDL GOAL <130    Advanced directives, counseling/discussion    Impaired fasting glucose    Knee pain    Family history of colon cancer    Benign neoplasm of skin of trunk, except scrotum    Microscopic colitis, unspecified  microscopic colitis type    Arthritis of right knee    Onychomycosis    Cognitive impairment     Past Surgical History:   Procedure Laterality Date    cataracts      COLONOSCOPY N/A 2014    Procedure: COLONOSCOPY;  Surgeon: Todd Borrego MD;  Location:  GI    EYE SURGERY      Cataract surgery on both eyes    GYN SURGERY  ?    d/c    SURGICAL HISTORY OF -       s/p D&C 25 years ago    SURGICAL HISTORY OF -   ,     Colonoscopy       Social History     Tobacco Use    Smoking status: Never    Smokeless tobacco: Never   Substance Use Topics    Alcohol use: Yes     Comment: two or more on occassion     Family History   Problem Relation Age of Onset    Cancer - colorectal Mother         82 yo     Colon Cancer Mother             Cancer Father         Brain tumor, age 46         Current Outpatient Medications   Medication Sig Dispense Refill    cholecalciferol (VITAMIN-D) 1000 UNIT capsule Take 1 capsule by mouth daily      ciclopirox (PENLAC) 8 % external solution Apply to adjacent skin and affected nails daily.  Remove with alcohol every 7 days, then repeat. 6.6 mL 4    donepezil (ARICEPT) 5 MG tablet Take 1 tablet (5 mg) by mouth At Bedtime 90 tablet 1    neomycin-polymyxin-hydrocortisone (CORTISPORIN) 3.5-04726-1 otic solution Place 3 drops into the right ear 4 times daily 10 mL 0    simvastatin (ZOCOR) 40 MG tablet TAKE 1 TABLET BY MOUTH EVERY DAY 30 tablet 1    terbinafine (LAMISIL) 1 % external cream Apply topically 2 times daily 30 g 1    venlafaxine (EFFEXOR XR) 37.5 MG 24 hr capsule TAKE 1 CAPSULE BY MOUTH EVERY DAY 90 capsule 2     Allergies   Allergen Reactions    Vicodin [Hydrocodone-Acetaminophen]      Recent Labs   Lab Test 22  1005 21  1010 20  0957 19  0932 16  1043 16  1140   A1C  --   --   --   --   --  5.8   LDL  --  98 79 111*   < >  --    HDL  --  77 55 70   < >  --    TRIG  --  100 78 104   < >  --    ALT 16 22 26 24   < >  --   "  CR 0.63 0.74 0.59 0.66   < >  --    GFRESTIMATED >90 81 >90 89   < >  --    GFRESTBLACK  --  >90 >90 >90   < >  --    POTASSIUM 4.4 4.1 3.9 4.4   < >  --    TSH 2.14  --   --   --   --   --     < > = values in this interval not displayed.              Review of Systems   Constitutional:  Negative for chills and fever.   HENT:  Positive for ear pain. Negative for congestion, hearing loss and sore throat.    Eyes:  Negative for pain and visual disturbance.   Respiratory:  Negative for cough and shortness of breath.    Cardiovascular:  Negative for chest pain and palpitations.   Gastrointestinal:  Negative for abdominal pain, constipation, diarrhea and nausea.   Genitourinary:  Negative for dysuria, frequency, genital sores, hematuria and urgency.   Musculoskeletal:  Positive for arthralgias and myalgias. Negative for joint swelling.   Skin:  Negative for rash.   Neurological:  Positive for weakness. Negative for dizziness and headaches.   Psychiatric/Behavioral:  The patient is nervous/anxious.          OBJECTIVE:   /68   Pulse 85   Temp 98.7  F (37.1  C) (Tympanic)   Resp 18   Ht 1.727 m (5' 8\")   Wt 85.9 kg (189 lb 6.4 oz)   SpO2 96%   BMI 28.80 kg/m   Estimated body mass index is 26.34 kg/m  as calculated from the following:    Height as of 12/20/22: 1.778 m (5' 10\").    Weight as of 6/22/23: 83.3 kg (183 lb 9.6 oz).  Physical Exam  GENERAL: healthy, alert and no distress  EYES: Eyes grossly normal to inspection, PERRL and conjunctivae and sclerae normal  HENT: ear canals and TM's normal, nose and mouth without ulcers or lesions  NECK: no adenopathy, no asymmetry, masses, or scars and thyroid normal to palpation  RESP: lungs clear to auscultation - no rales, rhonchi or wheezes  CV: regular rate and rhythm, normal S1 S2, no S3 or S4, no murmur, click or rub, no peripheral edema and peripheral pulses strong  ABDOMEN: soft, nontender, no hepatosplenomegaly, no masses and bowel sounds normal  MS: no " gross musculoskeletal defects noted, no edema  SKIN: no suspicious lesions or rashes  NEURO: Normal strength and tone, mentation intact and speech normal  BACK: no CVA tenderness, no paralumbar tenderness        ASSESSMENT / PLAN:   (Z00.01) Encounter for general adult medical examination with abnormal findings  (primary encounter diagnosis)  Plan: CBC with platelets, Comprehensive metabolic         panel (BMP + Alb, Alk Phos, ALT, AST, Total.         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 I will have her to start aricept, will have her to return in 3 months for follow up  Plan: CBC with platelets, Comprehensive metabolic         panel (BMP + Alb, Alk Phos, ALT, AST, Total.         Bili, TP), Lipid panel reflex to direct LDL         Fasting, donepezil (ARICEPT) 5 MG tablet            (M17.0) Primary osteoarthritis of both knees  Comment: worsening with swelling and stiffness over past 3-4 months, will have her to start PT,   Plan: Physical Therapy Referral            (H60.391) Infective otitis externa, right  Comment: on right ear,  has swelling and discharge  Will have her to start ear drops for control the infection   Plan: neomycin-polymyxin-hydrocortisone (CORTISPORIN)        3.5-14893-7 otic solution            Patient has been advised of split billing requirements and indicates understanding: Yes      COUNSELING:  Reviewed preventive health counseling, as reflected in patient instructions        Francisca Ramírez reports that Francisca Ramírez has never smoked. Francisca Ramírez has never used smokeless tobacco.      Appropriate preventive services were discussed with this patient,  including applicable screening as appropriate for cardiovascular disease, diabetes, osteopenia/osteoporosis, and glaucoma.  As appropriate for age/gender, discussed screening for colorectal cancer, prostate cancer, breast cancer, and cervical cancer. Checklist reviewing preventive services available has been given to the patient.    Reviewed patients plan of care and provided an AVS. The Basic Care Plan (routine screening as documented in Health Maintenance) for Francisac meets the Care Plan requirement. This Care Plan has been established and reviewed with the Patient.          Alen Vega MD  [facility]    Identified Health Risks:  I have reviewed Opioid Use Disorder and Substance Use Disorder risk factors and made any needed referrals.

## 2023-08-09 NOTE — PATIENT INSTRUCTIONS
Please start physical therapy, the order sheets is printed out. Please bring the sheets to the physical therapist clinic when you  have a first appointment  Please start taking ear drop on your right ear for infection   Please start taking medicine called (Donepezil) for your cognitive impairement, and plan to revisit for follow up with Dr Vega in 3 months.    Patient Education   Personalized Prevention Plan  You are due for the preventive services outlined below.  Your care team is available to assist you in scheduling these services.  If you have already completed any of these items, please share that information with your care team to update in your medical record.  Health Maintenance Due   Topic Date Due    COVID-19 Vaccine (5 - Pfizer series) 04/20/2023     Hearing Loss: Care Instructions  Overview     Hearing loss is a sudden or slow decrease in how well you hear. It can range from slight to profound. Permanent hearing loss can occur with aging. It also can happen when you are exposed long-term to loud noise. Examples include listening to loud music, riding motorcycles, or being around other loud machines.  Hearing loss can affect your work and home life. It can make you feel lonely or depressed. You may feel that you have lost your independence. But hearing aids and other devices can help you hear better and feel connected to others.  Follow-up care is a key part of your treatment and safety. Be sure to make and go to all appointments, and call your doctor if you are having problems. It's also a good idea to know your test results and keep a list of the medicines you take.  How can you care for yourself at home?  Avoid loud noises whenever possible. This helps keep your hearing from getting worse.  Always wear hearing protection around loud noises.  Wear a hearing aid as directed.  A professional can help you pick a hearing aid that will work best for you.  You can also get hearing aids over the counter for  "mild to moderate hearing loss.  Have hearing tests as your doctor suggests. They can show whether your hearing has changed. Your hearing aid may need to be adjusted.  Use other devices as needed. These may include:  Telephone amplifiers and hearing aids that can connect to a television, stereo, radio, or microphone.  Devices that use lights or vibrations. These alert you to the doorbell, a ringing telephone, or a baby monitor.  Television closed-captioning. This shows the words at the bottom of the screen. Most new TVs can do this.  TTY (text telephone). This lets you type messages back and forth on the telephone instead of talking or listening. These devices are also called TDD. When messages are typed on the keyboard, they are sent over the phone line to a receiving TTY. The message is shown on a monitor.  Use text messaging, social media, and email if it is hard for you to communicate by telephone.  Try to learn a listening technique called speechreading. It is not lipreading. You pay attention to people's gestures, expressions, posture, and tone of voice. These clues can help you understand what a person is saying. Face the person you are talking to, and have them face you. Make sure the lighting is good. You need to see the other person's face clearly.  Think about counseling if you need help to adjust to your hearing loss.  When should you call for help?  Watch closely for changes in your health, and be sure to contact your doctor if:    You think your hearing is getting worse.     You have new symptoms, such as dizziness or nausea.   Where can you learn more?  Go to https://www.[employer]net/patiented  Enter R798 in the search box to learn more about \"Hearing Loss: Care Instructions.\"  Current as of: March 1, 2023               Content Version: 13.7    7582-4703 [employer].   Care instructions adapted under license by your healthcare professional. If you have questions about a medical condition " or this instruction, always ask your healthcare professional. [employer] disclaims any warranty or liability for your use of this information.

## 2023-08-24 DIAGNOSIS — E78.5 HYPERLIPIDEMIA LDL GOAL <130: ICD-10-CM

## 2023-08-24 RX ORDER — SIMVASTATIN 40 MG
TABLET ORAL
Qty: 90 TABLET | Refills: 3 | Status: SHIPPED | OUTPATIENT
Start: 2023-08-24 | End: 2023-12-04

## 2023-09-18 ENCOUNTER — ALLIED HEALTH/NURSE VISIT (OUTPATIENT)
Dept: FAMILY MEDICINE | Facility: CLINIC | Age: 73
End: 2023-09-18
Payer: COMMERCIAL

## 2023-09-18 DIAGNOSIS — Z23 ENCOUNTER FOR IMMUNIZATION: Primary | ICD-10-CM

## 2023-09-18 PROCEDURE — 99207 PR NO CHARGE NURSE ONLY: CPT

## 2023-09-18 PROCEDURE — G0008 ADMIN INFLUENZA VIRUS VAC: HCPCS

## 2023-09-18 PROCEDURE — 90662 IIV NO PRSV INCREASED AG IM: CPT

## 2023-09-18 NOTE — PROGRESS NOTES
Prior to immunization administration, verified patients identity using patient s name and date of birth. Please see Immunization Activity for additional information.     Screening Questionnaire for Adult Immunization    Are you sick today?   No   Do you have allergies to medications, food, a vaccine component or latex?   No   Have you ever had a serious reaction after receiving a vaccination?   No   Do you have a long-term health problem with heart, lung, kidney, or metabolic disease (e.g., diabetes), asthma, a blood disorder, no spleen, complement component deficiency, a cochlear implant, or a spinal fluid leak?  Are you on long-term aspirin therapy?   No   Do you have cancer, leukemia, HIV/AIDS, or any other immune system problem?   No   Do you have a parent, brother, or sister with an immune system problem?   No   In the past 3 months, have you taken medications that affect  your immune system, such as prednisone, other steroids, or anticancer drugs; drugs for the treatment of rheumatoid arthritis, Crohn s disease, or psoriasis; or have you had radiation treatments?   No   Have you had a seizure, or a brain or other nervous system problem?   No   During the past year, have you received a transfusion of blood or blood    products, or been given immune (gamma) globulin or antiviral drug?   No   For women: Are you pregnant or is there a chance you could become       pregnant during the next month?   No   Have you received any vaccinations in the past 4 weeks?   No     Immunization questionnaire answers were all negative.    I have reviewed the following standing orders:   This patient is due and qualifies for the Influenza vaccine.    Click here for Influenza Vaccine Standing Order    I have reviewed the vaccines inclusion and exclusion criteria; No concerns regarding eligibility.     Patient instructed to remain in clinic for 15 minutes afterwards, and to report any adverse reactions.     Screening performed by  Feli Umana MA on 9/18/2023 at 1:25 PM.

## 2023-12-04 DIAGNOSIS — E78.5 HYPERLIPIDEMIA LDL GOAL <130: ICD-10-CM

## 2023-12-04 RX ORDER — SIMVASTATIN 40 MG
TABLET ORAL
Qty: 90 TABLET | Refills: 2 | Status: SHIPPED | OUTPATIENT
Start: 2023-12-04 | End: 2024-08-23

## 2023-12-04 NOTE — TELEPHONE ENCOUNTER
Medication Question or Refill    What medication are you calling about (include dose and sig)?:     simvastatin (ZOCOR) 40 MG tablet     Preferred Pharmacy:  [facility] [address] [place] [facility] [place] - [address]  [address] [facility]  [place] [address]  Phone: [phone] Fax: [phone]    Controlled Substance Agreement on file:   CSA -- Patient Level:    CSA: None found at the patient level.       Who prescribed the medication?: Alen Vega MD    Do you need a refill? Yes    When did you use the medication last?   Out of medication    Patient offered an appointment? No    Do you have any questions or concerns?  No    Could we send this information to you in [place] [place] or would you prefer to receive a phone call?:   Patient would prefer a phone call     Okay to leave a detailed message?: Yes at Cell number on file:    Telephone Information:   Mobile [phone]     Paige Perez on 12/4/2023 at 10:41 AM

## 2023-12-07 DIAGNOSIS — R41.89 COGNITIVE IMPAIRMENT: ICD-10-CM

## 2023-12-07 RX ORDER — DONEPEZIL HYDROCHLORIDE 5 MG/1
5 TABLET, FILM COATED ORAL AT BEDTIME
Qty: 90 TABLET | Refills: 1 | Status: SHIPPED | OUTPATIENT
Start: 2023-12-07 | End: 2024-01-08

## 2023-12-15 ENCOUNTER — NURSE TRIAGE (OUTPATIENT)
Dept: FAMILY MEDICINE | Facility: CLINIC | Age: 73
End: 2023-12-15
Payer: COMMERCIAL

## 2023-12-15 DIAGNOSIS — F43.0 ACUTE REACTION TO STRESS: ICD-10-CM

## 2023-12-15 RX ORDER — VENLAFAXINE HYDROCHLORIDE 37.5 MG/1
CAPSULE, EXTENDED RELEASE ORAL
Qty: 90 CAPSULE | Refills: 2 | Status: SHIPPED | OUTPATIENT
Start: 2023-12-15 | End: 2024-08-27

## 2023-12-15 NOTE — TELEPHONE ENCOUNTER
Called pt to assist with scheduling OV for R arm pain. No answer, left voice message for pt to return call.    Ava Leigh,  [facility]

## 2023-12-15 NOTE — TELEPHONE ENCOUNTER
Nurse Triage SBAR    Is this a 2nd Level Triage? YES, LICENSED PRACTITIONER REVIEW IS REQUIRED    Situation:   Patient states that it is her right arm that is sore. States that she has right arm pain that comes and goes. Hurts when she turns over in bed. Shoulder hurts. Rates arm pain as 3-4/10.  Has been going on a couple of weeks. No other symptoms.   Patient was reading online on her phone. She called informing the writer that the pharmacy told her to stop her simvastatin.   Patient has no other muscle or joint pain.    Background: Cognitive impairment    Assessment: Moderate right arm pain with no known cause.     Protocol Recommended Disposition:   See in Office Within 3 Days    Recommendation: Please advise if OK to use same day next week.      Routed to provider    Does the patient meet one of the following criteria for ADS visit consideration? 16+ years old, with an [facility] PCP     TIP  Providers, please consider if this condition is appropriate for management at one of our Acute and Diagnostic Services sites.     If patient is a good candidate, please use dotphrase <dot>triageresponse and select Refer to ADS to document.    .   Reason for Disposition   MODERATE pain (e.g., interferes with normal activities) and present > 3 days    Additional Information   Negative: Shock suspected (e.g., cold/pale/clammy skin, too weak to stand, low BP, rapid pulse)   Negative: Similar pain previously and it was from 'heart attack'   Negative: Similar pain previously from 'angina' and not relieved by nitroglycerin   Negative: Sounds like a life-threatening emergency to the triager   Negative: Followed an injury to arm   Negative: Chest pain   Negative: Wound looks infected   Negative: Elbow pain is main symptom   Negative: Hand or wrist pain is main symptom   Negative: Difficulty breathing or unusual sweating (e.g., sweating without exertion)   Negative: Chest pain lasting longer than 5 minutes   Negative: Age > 40 and no  "obvious cause for pain, pain still present even when not moving the arm   Negative: Fever and red area (or area very tender to touch)   Negative: Swollen joint and fever   Negative: Entire arm is swollen   Negative: Patient sounds very sick or weak to the triager   Negative: SEVERE pain (e.g., excruciating, unable to do any normal activities)   Negative: Red area or streak > 2 inches (or 5 cm)   Negative: Cast on wrist or arm and now increasing pain   Negative: Weakness (i.e., loss of strength) in hand or fingers  (Exception: Not truly weak; hand feels weak because of pain.)   Negative: Arm pains with exertion (e.g., occurs with walking; goes away on resting)   Negative: Painful rash with multiple small blisters grouped together (i.e., dermatomal distribution or 'band' or 'stripe')   Negative: Looks like a boil, infected sore, deep ulcer, or other infected rash (spreading redness, pus)   Negative: Localized rash is very painful (no fever)   Negative: Numbness (i.e., loss of sensation) in hand or fingers   Negative: Localized pain, redness or hard lump along vein   Negative: Patient wants to be seen    Answer Assessment - Initial Assessment Questions  1. ONSET: \"When did the pain start?\"      Couple of weeks ago  2. LOCATION: \"Where is the pain located?\"      Right arm and shoulder  3. PAIN: \"How bad is the pain?\" (Scale 1-10; or mild, moderate, severe)    - MILD (1-3): Doesn't interfere with normal activities.    - MODERATE (4-7): Interferes with normal activities (e.g., work or school) or awakens from sleep.    - SEVERE (8-10): Excruciating pain, unable to do any normal activities, unable to hold a cup of water.      3-4/10  4. WORK OR EXERCISE: \"Has there been any recent work or exercise that involved this part of the body?\"      no  5. CAUSE: \"What do you think is causing the arm pain?\"      simvastatin  6. OTHER SYMPTOMS: \"Do you have any other symptoms?\" (e.g., neck pain, swelling, rash, fever, numbness, " "weakness)      no  7. PREGNANCY: \"Is there any chance you are pregnant?\" \"When was your last menstrual period?\"      NA    Protocols used: Arm Pain-A-OH  Liliana Iyer RN    "

## 2023-12-19 NOTE — TELEPHONE ENCOUNTER
Left voicemail for patient to call back schedule- have appointment held for Thursday for patient.

## 2024-01-08 ENCOUNTER — OFFICE VISIT (OUTPATIENT)
Dept: FAMILY MEDICINE | Facility: CLINIC | Age: 74
End: 2024-01-08
Attending: FAMILY MEDICINE
Payer: COMMERCIAL

## 2024-01-08 VITALS
SYSTOLIC BLOOD PRESSURE: 128 MMHG | HEART RATE: 83 BPM | WEIGHT: 206 LBS | TEMPERATURE: 97.9 F | OXYGEN SATURATION: 97 % | DIASTOLIC BLOOD PRESSURE: 72 MMHG | HEIGHT: 69 IN | BODY MASS INDEX: 30.51 KG/M2 | RESPIRATION RATE: 18 BRPM

## 2024-01-08 DIAGNOSIS — E78.5 HYPERLIPIDEMIA LDL GOAL <130: ICD-10-CM

## 2024-01-08 DIAGNOSIS — M67.911 TENDINOPATHY OF RIGHT SHOULDER: ICD-10-CM

## 2024-01-08 DIAGNOSIS — R41.89 COGNITIVE IMPAIRMENT: ICD-10-CM

## 2024-01-08 DIAGNOSIS — F43.0 ACUTE REACTION TO STRESS: Primary | ICD-10-CM

## 2024-01-08 PROCEDURE — 90480 ADMN SARSCOV2 VAC 1/ONLY CMP: CPT | Performed by: FAMILY MEDICINE

## 2024-01-08 PROCEDURE — 99215 OFFICE O/P EST HI 40 MIN: CPT | Mod: 25 | Performed by: FAMILY MEDICINE

## 2024-01-08 PROCEDURE — 91320 SARSCV2 VAC 30MCG TRS-SUC IM: CPT | Performed by: FAMILY MEDICINE

## 2024-01-08 RX ORDER — DONEPEZIL HYDROCHLORIDE 5 MG/1
5 TABLET, FILM COATED ORAL AT BEDTIME
Qty: 90 TABLET | Refills: 1 | Status: SHIPPED | OUTPATIENT
Start: 2024-01-08 | End: 2024-07-12

## 2024-01-08 ASSESSMENT — PAIN SCALES - GENERAL: PAINLEVEL: EXTREME PAIN (8)

## 2024-01-08 NOTE — PATIENT INSTRUCTIONS
Please continue taking Donepezil 5mg daily  We will recheck with you within about 3 months, So please schedule visit with me early April, 2024  Please consider taking RSV vaccine at the pharmacy due to Medicare coverage  I highlighted your medication in the list for you to take daily without skipping or missing   Please schedule physical therapy for right shoulder tendinopathy

## 2024-01-08 NOTE — PROGRESS NOTES
"  Assessment & Plan     Acute reaction to stress  Has been on venlafaxine without side effect, will keep her on the current dose     Cognitive impairment  Hasn't been taking the meds, sx has been worsening, encouraged her to take it ASAP and follow up with me in 3 months, the instruction was written and highlighted   - donepezil (ARICEPT) 5 MG tablet; Take 1 tablet (5 mg) by mouth at bedtime    Hyperlipidemia LDL goal <130  Has been stable with current dose of meds, will keep monitoring and recheck in 3 months     Tendinopathy of right shoulder  On right shoulder, has no injury in the past, will have her to start PT  - Physical Therapy Referral; Future      50 minutes spent by me on the date of the encounter doing chart review, history and exam, documentation and further activities per the note       BMI:   Estimated body mass index is 30.51 kg/m  as calculated from the following:    Height as of this encounter: 1.75 m (5' 8.9\").    Weight as of this encounter: 93.4 kg (206 lb).   Weight management plan: Discussed healthy diet and exercise guidelines    FUTURE APPOINTMENTS:       - Follow-up visit in 4 months     Alen Vega MD  [facility] is a 73 year old, presenting for the following health issues:  Recheck Medication, Shoulder Pain (Right shoulder), and Derm Problem (Blotchy spot on right arm )        1/8/2024     1:49 PM   Additional Questions   Roomed by Nadiya CHASITY   Accompanied by Son       Shoulder Pain    History of Present Illness       Reason for visit:  Check up on medication and pain in right sholder  Symptom onset:  3-4 weeks ago  Symptoms include:  Pain in right sholuder  Symptom intensity:  Moderate  Symptom progression:  Staying the same  Had these symptoms before:  No  What makes it worse:  Na  What makes it better:  Na    Francisca TRAY Darrell eats 0-1 servings of fruits and vegetables daily.Francisca TRAY Ramírez consumes 0 sweetened beverage(s) daily.Francisca FELIZ" "Darrell exercises with enough effort to increase Francisca Ramírez's heart rate 9 or less minutes per day.  Francisca Ramírez exercises with enough effort to increase Francisca Ramírez's heart rate 3 or less days per week. Francisca Ramírez is missing 1 dose(s) of medications per week.         Hyperlipidemia Follow-Up    Are you regularly taking any medication or supplement to lower your cholesterol?   Yes- statin  Are you having muscle aches or other side effects that you think could be caused by your cholesterol lowering medication?  No    Depression and Anxiety Follow-Up  How are you doing with your depression since your last visit? No change  How are you doing with your anxiety since your last visit?  No change  Are you having other symptoms that might be associated with depression or anxiety? No  Have you had a significant life event? No   Do you have any concerns with your use of alcohol or other drugs? No    Social History     Tobacco Use    Smoking status: Never    Smokeless tobacco: Never   Vaping Use    Vaping Use: Never used   Substance Use Topics    Alcohol use: Yes     Comment: two or more on occassion    Drug use: No         11/27/2017     8:53 AM 4/30/2018     2:00 PM 1/7/2019     9:30 AM   PHQ   PHQ-9 Total Score 1 9 2   Q9: Thoughts of better off dead/self-harm past 2 weeks Not at all Not at all Not at all         11/27/2017     8:53 AM 4/30/2018     2:00 PM 1/7/2019     9:30 AM   BELA-7 SCORE   Total Score 2 11 1         Suicide Assessment Five-step Evaluation and Treatment (SAFE-T)          Review of Systems   Constitutional, HEENT, cardiovascular, pulmonary, gi and gu systems are negative, except as otherwise noted.      Objective    /72 (BP Location: Left arm, Patient Position: Sitting, Cuff Size: Adult Regular)   Pulse 83   Temp 97.9  F (36.6  C) (Tympanic)   Resp 18   Ht 1.75 m (5' 8.9\")   Wt 93.4 kg (206 lb)   SpO2 97%   BMI 30.51 kg/m    Body mass index is 30.51 " kg/m .  Physical Exam   GENERAL: healthy, alert and no distress  EYES: Eyes grossly normal to inspection, PERRL and conjunctivae and sclerae normal  HENT: ear canals and TM's normal, nose and mouth without ulcers or lesions  NECK: no adenopathy, no asymmetry, masses, or scars and thyroid normal to palpation  RESP: lungs clear to auscultation - no rales, rhonchi or wheezes  CV: regular rate and rhythm, normal S1 S2, no S3 or S4, no murmur, click or rub, no peripheral edema and peripheral pulses strong  ABDOMEN: soft, nontender, no hepatosplenomegaly, no masses and bowel sounds normal  MS: no gross musculoskeletal defects noted, no edema  SKIN: no suspicious lesions or rashes  NEURO: Normal strength and tone, mentation intact and speech normal  BACK: no CVA tenderness, no paralumbar tenderness  PSYCH: mentation appears normal, affect normal/bright  LYMPH: no cervical, supraclavicular, axillary, or inguinal adenopathy

## 2024-01-08 NOTE — COMMUNITY RESOURCES LIST (ENGLISH)
01/08/2024   [facility] [employer]  N/A  For questions about this resource list or additional care needs, please contact your primary care clinic or care manager.  Phone: [phone]   Email: N/A   Address: [address] [place] [id number]   Hours: N/A        Financial Stability       Utility payment assistance  1  [employer] ([facility]) [facility] [place] - Energy Assistance Program (EAP) Distance: 5.25 miles      Phone/Virtual   [address] [place] [address]  Language: English, Nam[place]ian  Hours: Mon - Fri 8:00 AM - 4:30 PM  Fees: Free   Phone: (784) 229-1415 Email: info@[facility].org Website: https://www.SensibleSelf.org/     2  [facility] ([facility]) - [employer] - Homelessness Prevention - Utility payment assistance Distance: 12.01 miles      In-Person, Phone/Virtual   [address] [address] [place] [address]  Language: English, Polish, Namibian  Hours: Mon 12:00 AM - 7:00 PM , Tue 10:00 AM - 3:00 PM , Wed - Thu 10:00 AM - 2:30 PM  Fees: Free   Phone: (159) 572-3727 Email: ica@[employer].org Website: http://www.icafoodshelf.org          Important Numbers & Websites       Emergency Services   911  [facility] Services   311  Poison Control   (791) 234-7593  Suicide Prevention Lifeline   (164) 854-5166 (TALK)  Child Abuse Hotline   (307) 983-9585 (4-A-Child)  Sexual Assault Hotline   (423) 943-7997 (HOPE)  National Runaway Safeline   (338) 228-1156 (RUNAWAY)  All-Options Talkline   (648) 377-7566  Substance Abuse Referral   (758) 330-7277 (HELP)

## 2024-02-15 ENCOUNTER — TELEPHONE (OUTPATIENT)
Dept: NEUROPSYCHOLOGY | Facility: CLINIC | Age: 74
End: 2024-02-15
Payer: COMMERCIAL

## 2024-02-15 NOTE — TELEPHONE ENCOUNTER
[facility]    Phone Message    May a detailed message be left on voicemail: yes     Reason for Call: Other: Patients son Vladislav called in to schedule a follow up Neuropsych eval with Tejinder. Son said they were to follow up in a year. Writer unsure of what type of visit to schedule. Please call vladislav back to set something up.      Action Taken: Message routed to:  [facility] ([facility] Neuropsychology    Travel Screening: Not Applicable

## 2024-02-16 ENCOUNTER — TELEPHONE (OUTPATIENT)
Dept: FAMILY MEDICINE | Facility: CLINIC | Age: 74
End: 2024-02-16
Payer: COMMERCIAL

## 2024-02-16 DIAGNOSIS — R41.89 COGNITIVE IMPAIRMENT: Primary | ICD-10-CM

## 2024-02-16 NOTE — TELEPHONE ENCOUNTER
We will note this on the referral too, but just documenting here also if he calls before the referral is triaged! This patient can be seen by any provider at any location on/after May 2024. As Levi said, we do recommend they stick with the original provider they saw for continuity of care, but if the family wishes to try to get in sooner then they can be seen by anyone. Additionally for some more context, Dr. Marr recommended follow up in 12 months, but it generally is okay if the return appt occurs awhile after that date. The time range is most important so that they are not seen before that time, as there can be practice effects if the tests are given too close together. When they are scheduled, I would recommend that they get on the wait list to try to get a sooner appt that is closer to the 12 month time frame (but not sooner than May 2024).    Radha Rodriguez  Psychometrist

## 2024-02-16 NOTE — TELEPHONE ENCOUNTER
Per clinical team: Need a new referral placed for neuropsychology in order to schedule. They could have Dr. Vega place the referral or another provider and that will get to us for triage. Would need to make sure the referral placed will not  before they are able to get in for a re-evaluation.     Spoke with Wilmar. Relayed above. Advised Wilmar that at time of call Dr. Marr's soonest openings are late Nov/early Dec.    Wilmar verbalized understanding, stated he will get a referral. Expressed interest in completing repeat eval with a different provider to meet 1 year deadline.     Writer advised that other providers are booking out until Oct, but will send message to clinical team relaying this request.    Levi Tracy on 2024 at 9:56 AM

## 2024-02-16 NOTE — TELEPHONE ENCOUNTER
Order/Referral Request    Who is requesting: [facility] in [place]     Orders being requested: Neurologist Referral     Reason service is needed/diagnosis: Memory loss     When are orders needed by: ASAP     Has this been discussed with Provider: Yes, son states Dr. Vega should be aware of Francisca's situation     Does patient have a preference on a Group/Provider/Facility? [facility]     Does patient have an appointment scheduled?: No, not specifically for this request    Where to send orders: Place orders within Epic    Could we send this information to you in [facility] or would you prefer to receive a phone call?:   Patient would prefer a phone call   Okay to leave a detailed message?: Yes at Other phone number:  [phone] - son's phone number *

## 2024-02-16 NOTE — TELEPHONE ENCOUNTER
Spoke with Son ( Wilmar) let him know that Dr Vega has placed the referral. He will contact Neurology in [place].

## 2024-04-09 ENCOUNTER — TELEPHONE (OUTPATIENT)
Dept: FAMILY MEDICINE | Facility: CLINIC | Age: 74
End: 2024-04-09

## 2024-04-09 NOTE — TELEPHONE ENCOUNTER
Spoke to pt and son and helped change appointment to 4/15/24 with PCP.     Mikayla Addison RN on 4/9/2024 at 4:23 PM

## 2024-04-09 NOTE — TELEPHONE ENCOUNTER
Reason for Call:  Appointment Request  Follow-up appt with labwork.     Patient missed appt:   Pt definitely confused about where she was to go for today's appt (4/9/24). Pt did not show up at clinic and went somewhere else (don't know where).    Pt rescheduled 5/20/24 w/ Dr. Vega for follow-up with labwork.     Request:   Pt's son Wilmar requesting appt sooner.  At least have a lab appt prior to 5/20/24.     Requested provider: Alen Vega    Please advise.     Requesting phone call.   All calls should go to Wilmar Ramírez at [phone].    Call taken on 4/9/2024 at 12:19 PM by Paige Perez

## 2024-04-15 ENCOUNTER — OFFICE VISIT (OUTPATIENT)
Dept: FAMILY MEDICINE | Facility: CLINIC | Age: 74
End: 2024-04-15
Payer: COMMERCIAL

## 2024-04-15 VITALS
DIASTOLIC BLOOD PRESSURE: 79 MMHG | RESPIRATION RATE: 18 BRPM | HEIGHT: 68 IN | OXYGEN SATURATION: 99 % | TEMPERATURE: 97.8 F | WEIGHT: 206.8 LBS | SYSTOLIC BLOOD PRESSURE: 133 MMHG | BODY MASS INDEX: 31.34 KG/M2 | HEART RATE: 75 BPM

## 2024-04-15 DIAGNOSIS — E78.5 HYPERLIPIDEMIA LDL GOAL <130: ICD-10-CM

## 2024-04-15 DIAGNOSIS — R79.89 ELEVATED TSH: ICD-10-CM

## 2024-04-15 DIAGNOSIS — R41.89 COGNITIVE IMPAIRMENT: Primary | ICD-10-CM

## 2024-04-15 DIAGNOSIS — F43.0 ACUTE REACTION TO STRESS: ICD-10-CM

## 2024-04-15 PROCEDURE — 99214 OFFICE O/P EST MOD 30 MIN: CPT | Performed by: FAMILY MEDICINE

## 2024-04-15 PROCEDURE — 80053 COMPREHEN METABOLIC PANEL: CPT | Performed by: FAMILY MEDICINE

## 2024-04-15 PROCEDURE — 83721 ASSAY OF BLOOD LIPOPROTEIN: CPT | Performed by: FAMILY MEDICINE

## 2024-04-15 PROCEDURE — 93000 ELECTROCARDIOGRAM COMPLETE: CPT | Performed by: FAMILY MEDICINE

## 2024-04-15 PROCEDURE — 36415 COLL VENOUS BLD VENIPUNCTURE: CPT | Performed by: FAMILY MEDICINE

## 2024-04-15 ASSESSMENT — PAIN SCALES - GENERAL: PAINLEVEL: NO PAIN (0)

## 2024-04-15 NOTE — PROGRESS NOTES
Assessment & Plan     HYPERLIPIDEMIA LDL GOAL <130  Has bee stable with medicine and life style modification   Will keep monitoring   - EKG 12-lead complete w/read - Clinics  - Comprehensive metabolic panel (BMP + Alb, Alk Phos, ALT, AST, Total. Bili, TP); Future  - LDL cholesterol direct; Future    Acute reaction to stress  Stable with current dose of meds, will keep monitoring   - EKG 12-lead complete w/read - Clinics  - Comprehensive metabolic panel (BMP + Alb, Alk Phos, ALT, AST, Total. Bili, TP); Future  - LDL cholesterol direct; Future    Cognitive impairment  Has been stable with current dose of meds, will keep monitoring   She is on Donepezil, and EKG is normal   - EKG 12-lead complete w/read - Clinics  - Comprehensive metabolic panel (BMP + Alb, Alk Phos, ALT, AST, Total. Bili, TP); Future  - LDL cholesterol direct; Future    Elevated TSH  Stable   - EKG 12-lead complete w/read - Clinics  - Comprehensive metabolic panel (BMP + Alb, Alk Phos, ALT, AST, Total. Bili, TP); Future  - LDL cholesterol direct; Future          FUTURE APPOINTMENTS:       - Follow-up visit in 6 months for CPE and med check     Subjective   Francisca is a 73 year old, presenting for the following health issues:  Recheck Medication and Follow Up (LABS)        4/15/2024    10:44 AM   Additional Questions   Roomed by Mikayla CASANOVA     History of Present Illness       Reason for visit:  Labs    Francisca eats 2-3 servings of fruits and vegetables daily.Francisca consumes 1 sweetened beverage(s) daily.Francisca exercises with enough effort to increase Francisca's heart rate 9 or less minutes per day.  Francisca exercises with enough effort to increase Francisca's heart rate 3 or less days per week.   Francisca is taking medications regularly.         Hyperlipidemia Follow-Up    Are you regularly taking any medication or supplement to lower your cholesterol?   Yes- statin  Are you having muscle aches or other side effects that you think could be caused by your cholesterol  lowering medication?  No    Anxiety   How are you doing with your anxiety since your last visit? No change  Are you having other symptoms that might be associated with anxiety? No  Have you had a significant life event? No   Are you feeling depressed? No  Do you have any concerns with your use of alcohol or other drugs? No    Social History     Tobacco Use    Smoking status: Never    Smokeless tobacco: Never   Vaping Use    Vaping status: Never Used   Substance Use Topics    Alcohol use: Yes     Comment: two or more on occassion    Drug use: No         11/27/2017     8:53 AM 4/30/2018     2:00 PM 1/7/2019     9:30 AM   BELA-7 SCORE   Total Score 2 11 1         11/27/2017     8:53 AM 4/30/2018     2:00 PM 1/7/2019     9:30 AM   PHQ   PHQ-9 Total Score 1 9 2   Q9: Thoughts of better off dead/self-harm past 2 weeks Not at all Not at all Not at all         1/7/2019     9:30 AM   Last PHQ-9   1.  Little interest or pleasure in doing things 0   2.  Feeling down, depressed, or hopeless 1   3.  Trouble falling or staying asleep, or sleeping too much 0   4.  Feeling tired or having little energy 1   5.  Poor appetite or overeating 0   6.  Feeling bad about yourself 0   7.  Trouble concentrating 0   8.  Moving slowly or restless 0   Q9: Thoughts of better off dead/self-harm past 2 weeks 0   PHQ-9 Total Score 2   Difficulty at work, home, or with people Not difficult at all         1/7/2019     9:30 AM   BELA-7    1. Feeling nervous, anxious, or on edge 1   2. Not being able to stop or control worrying 0   3. Worrying too much about different things 0   4. Trouble relaxing 0   5. Being so restless that it is hard to sit still 0   6. Becoming easily annoyed or irritable 0   7. Feeling afraid, as if something awful might happen 0   BELA-7 Total Score 1   If you checked any problems, how difficult have they made it for you to do your work, take care of things at home, or get along with other people? Not difficult at all     How many  "servings of fruits and vegetables do you eat daily?  2-3  On average, how many sweetened beverages do you drink each day (Examples: soda, juice, sweet tea, etc.  Do NOT count diet or artificially sweetened beverages)?   4  How many days per week do you exercise enough to make your heart beat faster? 6  How many minutes a day do you exercise enough to make your heart beat faster? 20 - 29  How many days per week do you miss taking your medication? 0        Review of Systems  Constitutional, HEENT, cardiovascular, pulmonary, GI, , musculoskeletal, neuro, skin, endocrine and psych systems are negative, except as otherwise noted.      Objective    /79 (BP Location: Right arm, Patient Position: Sitting, Cuff Size: Adult Large)   Pulse 75   Temp 97.8  F (36.6  C) (Tympanic)   Resp 18   Ht 1.73 m (5' 8.11\")   Wt 93.8 kg (206 lb 12.8 oz)   SpO2 99%   BMI 31.34 kg/m    Body mass index is 31.34 kg/m .  Physical Exam   GENERAL: alert and no distress  EYES: Eyes grossly normal to inspection, PERRL and conjunctivae and sclerae normal  HENT: ear canals and TM's normal, nose and mouth without ulcers or lesions  NECK: no adenopathy, no asymmetry, masses, or scars  RESP: lungs clear to auscultation - no rales, rhonchi or wheezes  CV: regular rate and rhythm, normal S1 S2, no S3 or S4, no murmur, click or rub, no peripheral edema  ABDOMEN: soft, nontender, no hepatosplenomegaly, no masses and bowel sounds normal  MS: no gross musculoskeletal defects noted, no edema  SKIN: no suspicious lesions or rashes  NEURO: Normal strength and tone, mentation intact and speech normal  BACK: no CVA tenderness, no paralumbar tenderness  PSYCH: mentation appears normal, affect normal/bright  LYMPH: no cervical, supraclavicular, axillary, or inguinal adenopathy            Signed Electronically by: Alen Vega MD    "

## 2024-04-16 LAB
ALBUMIN SERPL BCG-MCNC: 4.3 G/DL (ref 3.5–5.2)
ALP SERPL-CCNC: 86 U/L (ref 40–150)
ALT SERPL W P-5'-P-CCNC: 18 U/L (ref 0–70)
ANION GAP SERPL CALCULATED.3IONS-SCNC: 10 MMOL/L (ref 7–15)
AST SERPL W P-5'-P-CCNC: 32 U/L (ref 0–45)
BILIRUB SERPL-MCNC: 0.5 MG/DL
BUN SERPL-MCNC: 13.5 MG/DL (ref 8–23)
CALCIUM SERPL-MCNC: 9.6 MG/DL (ref 8.8–10.2)
CHLORIDE SERPL-SCNC: 104 MMOL/L (ref 98–107)
CREAT SERPL-MCNC: 0.75 MG/DL (ref 0.51–1.17)
DEPRECATED HCO3 PLAS-SCNC: 28 MMOL/L (ref 22–29)
EGFRCR SERPLBLD CKD-EPI 2021: 83 ML/MIN/1.73M2
GLUCOSE SERPL-MCNC: 84 MG/DL (ref 70–99)
LDLC SERPL DIRECT ASSAY-MCNC: 125 MG/DL
POTASSIUM SERPL-SCNC: 4.8 MMOL/L (ref 3.4–5.3)
PROT SERPL-MCNC: 7.4 G/DL (ref 6.4–8.3)
SODIUM SERPL-SCNC: 142 MMOL/L (ref 135–145)

## 2024-04-25 ENCOUNTER — TRANSFERRED RECORDS (OUTPATIENT)
Dept: HEALTH INFORMATION MANAGEMENT | Facility: CLINIC | Age: 74
End: 2024-04-25

## 2024-06-12 ENCOUNTER — OFFICE VISIT (OUTPATIENT)
Dept: NEUROPSYCHOLOGY | Facility: CLINIC | Age: 74
End: 2024-06-12
Payer: COMMERCIAL

## 2024-06-12 DIAGNOSIS — G31.84 AMNESTIC MCI (MILD COGNITIVE IMPAIRMENT WITH MEMORY LOSS): Primary | ICD-10-CM

## 2024-06-12 PROCEDURE — 96139 PSYCL/NRPSYC TST TECH EA: CPT

## 2024-06-12 PROCEDURE — 96133 NRPSYC TST EVAL PHYS/QHP EA: CPT

## 2024-06-12 PROCEDURE — 96138 PSYCL/NRPSYC TECH 1ST: CPT

## 2024-06-12 PROCEDURE — 96132 NRPSYC TST EVAL PHYS/QHP 1ST: CPT

## 2024-06-12 PROCEDURE — 90791 PSYCH DIAGNOSTIC EVALUATION: CPT

## 2024-06-12 NOTE — LETTER
2024      RE: Francisca Ramírez  [address] [facility]  [place] [address]       NEUROPSYCHOLOGICAL EVALUATION  **CONFIDENTIAL**    **This is a medical document intended for communication with the referring provider. It is written in medical language and may contain abbreviations or verbiage that are unfamiliar. It may appear blunt or direct. This report and the results within are not intended to be interpreted in isolation without consultation with your medical provider. **      Name: Francisca Ramírez Education: 12 years    (age): 1950 (73 years) HUERTA:  2024   Referral: Alen Vega MD  Department of Family Medicine Handedness:  MRN (Epic): Right  [id number]     IDENTIFYING INFORMATION AND REASON FOR REFERRAL   Ms. Ramírez is a 72-year-old, right-handed, White female. This evaluation was requested to provide a comprehensive re-evaluation of her neuropsychological status to inform treatment planning.     IMPRESSION  See below for relevant background information and detailed test results. See separate abstract for supporting documentation including a list of neuropsychological measures and test scores.    Ms. Ramírez underwent neuropsychological testing on 5/15/2023 which revealed significant difficulty on tests of verbal and visual memory characterized by amnestic rapid forgetting of information and difficulties on select tests of executive functioning. Her current neuropsychological performance revealed a generally stable cognitive profile with a few improved and declined scores. Specifically, while recall of a story improved to the below average range, her learning of a word list declined from low average to exceptionally low range. Consistent with her prior evaluation, she was unable to recall any words from the list or any figural details from a visual memory task following a delay and recognition scores across all memory tests were below expectation. Performances improved on tests of speeded  word reading and knowledge of health and safety; however, scores declined on tests of mental flexibility, semantic verbal fluency, and spatial perception. Overall, scores fell below expectation on tests of learning and memory, semantic verbal fluency, and aspects of executive functioning including mental flexibly and planning/organization. Scores were within expectation on tests of attention/working memory, processing speed, naming, letter-cue verbal fluency, basic visuoconstruction, cognitive inhibition, and abstract reasoning. Assessment of psychological status revealed the absence of clinically significant depression or excessive anxiety symptoms. While findings suggest Ms. Ramírez likely experiences cognitive inefficiencies in her day-to-day life, she and her son again reported limited functional difficulties at present. Importantly, she has several cognitive strengths that will be beneficial to her in order to compensate for any cognitive weaknesses. Taken together, observed test performance and reported levels of day-to-day functioning continue to suggest a diagnosis of mild neurocognitive disorder (i.e., amnestic mild cognitive impairment). Amnestic MCI is highly associated with future development of Alzheimer-type dementia and ongoing monitoring of her cognitive and functional status over time is recommended.     DIAGNOSTIC IMPRESSIONS (ICD-10)  Amnestic mild cognitive impairment    RECOMMENDATIONS  Results from the present evaluation revealed significant memory impairment. It is recommended that trusted family continue to provide periodic oversight and monitoring for complex activities of daily living to ensure her safety and wellbeing are maintained. For example, her son should continue to check that her medications are set up correctly and being taken daily.   Family members and providers may note that when communicating with Ms. Ramírez, she may benefit from novel information being broken down in  small chunks and repeated across time to facilitate recall.  Additionally, we encourage Ms. Ramírez to be accompanied to all medical appointments to ensure the accurate exchange and recollection of information.  Ms. Ramírez is encouraged to continue to live a healthy lifestyle that promotes brain health, including getting appropriate exercise (as advised by her healthcare providers), getting regular sleep, and eating healthy.    Ms. Ramírez should continue to maintain a socially active lifestyle. She is likely to benefit from continuing to engage in social activities that she enjoys as these activities may offer cognitive stimulation, help to prevent feelings of depression, and provide emotional benefits that will maximize quality of life.   Ms. Ramírez is encouraged to continue utilizing behavioral strategies to maximize cognitive functioning in her daily life such as eliminating distractions, avoiding multitasking, engaging in mindfulness/relaxation, using external aids like lists and notes, and taking the time needed to learn and recall information.   The current evaluation will serve as another data point against which results of future evaluations may be compared. Ms. Ramírez may be referred for a follow-up neuropsychological evaluation in 12 months to objectively assess neurocognitive change, or when changes in functional status become apparent.   Thank you for the opportunity to participate in Ms. Ramírez's care.  These findings and recommendations were reviewed with the patient and her son, Wilmar, in a separate feedback session on 6/19/2024 and all their questions were answered. If you have any questions regarding this evaluation, please do not hesitate to contact me.       Mayela Marr, Ph.D.,   Clinical Neuropsychologist    RELEVANT BACKGROUND INFORMATION AND SUPPORTING DOCUMENTATION  Gathered from a clinical interview with the patient and her son, Wilmar, and reviews of electronic medical  record.     History of Presenting Problem(s)  Ms. Ramírez underwent neuropsychological testing on 5/15/2023 which revealed significant difficulty on tests of verbal and visual memory characterized by amnestic rapid forgetting of information and difficulties on select tests of executive functioning, consistent with a diagnosis if mild neurocognitive disorder (amnestic mild cognitive impairment) in the setting of intact activities of daily living. She was started on donepezil after that evaluation. She and her son indicated her cognitive abilities may have improved since her prior evaluation. They described occasionally repeating questions and trouble recalling details of unimportant conversations, particularly when she is tired, but they denied other cognitive complaints. Functionally, they noted her ability to complete activities of daily living remain stable. Wilmar continues to assist with managing finances due to longstanding difficulties with organization, but she continues to be independent with other activities of daily living including managing appointments, meal preparation, and basic self-care. She only drives within ~2 miles of her home but she and her family denied any concerns about her driving. She generally manages her medications independently but her son double checks to make sure the box is set up correctly (they denied any errors) and he may remind her once per week to take them. She denied any falls, physical complaints, or sensorimotor disturbances and there have been no reported changes to her medical status in the past year. Emotionally, she denied any symptoms of depression, excessive anxiety, or psychological distress recently and there have been no notable changes to her behavior or personality.     Neurodiagnostic Findings   None    Medical History  Microscopic colitis  Benign neoplasm of skin  Arthritis  Denied history of stroke, seizure, or head injury with LOC or persisting  symptoms    Health Behaviors   Sleep: Denied sleep disturbance; denied snoring, gasping arousals, or parasomnic behaviors  Exercise: Walking daily  Pain: Occasional knee pain; denied pain during this evaluation    Psychiatric History  Ms. Ramírez reported current mood is  good   History is notable for depression that has been well managed with medication for many years; she denied any recent symptoms of depression and stated she very rarely feels down.  She otherwise denied history of hypomanic or manic mood symptoms, excessive anxiety or uncontrollable worry, compulsive behaviors, alteration of sensory perceptual processes or disordered thought.  Suicidality/ Self-harm: She denied any suicidal ideation, plan, or intent  Psychiatric treatment: Currently prescribed venlafaxine    Substance Use History  Alcohol: None  Nicotine: None  Cannabis: None  Problematic Substance Use: Denied    Current Medications (per patient report)  venlafaxine  simvastatin  donepezil  vitamin D    Educational, & Occupational History  Childhood behavioral / emotional / academic problems: Reported math was difficult, but she did not receive any assistance or accommodations  Native Language: English  Education: Described self as an average student; graduated high school on time; may have taken a couple of courses in vocational school  Occupation: ; customer service; 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 Descriptive ranges are based on American Academy of Clinical Neuropsychology (2020)  consensus guidelines, or test manuals where appropriate. All standardized scores are adjusted for age and additional adjustments for demographic factors such as education were performed where applicable. Results were compared to her prior evaluation on 5/15/2023.    PRE-MORBID ABILITY: Premorbid abilities were estimated within the average range based on single word reading ability assessed during her prior evaluation.   GENERAL COGNITIVE STATUS: Orientation was within expectation for place. She was not fully oriented to general personal information (incorrectly stating her age as  74 ), or time (2 months and 2 days off on the date ( April 10 ). She was able to name the current and the most recent [place] Presidents. Within the practical domain, performance was within expectation on a measure of conceptual understanding of issues pertaining to health and safety, reflecting an improvement compared to prior testing. Her score was consistent with individuals functioning at a high level of independence in the community.  ATTENTION/EXECUTIVE FUNCTIONS: Immediate auditory attention and working memory performances were average and stable. Verbal abstract reasoning performance was average and stable. Visual reasoning through pattern identification was low average and stable. Response inhibition performance was low average and stable. Performance on a test of set-shifting/cognitive flexibility was discontinued due to inability to complete the task with several errors, representing a decline. Copy of a complex figure was below average and stable but again notable for a disorganized and piecemeal approach with some inattention to detail. Psychomotor processing speed performances were average and stable.  LANGUAGE: Confrontation naming performance was average and stable. Letter-cue verbal fluency performance was average and stable. Semantic verbal fluency performance was exceptionally low and declined. Of note, she appeared to lose  track of task instructions saying only words in the category that start with a certain letter.  VISUOSPATIAL PROCESSING: Performance on a spatial perception task requiring judgement of angled lines was exceptionally low and declined. Copy of increasingly intricate designs was average and stable. Copy of a complex figure was below average and notable for extra and misplaced lines, poor line closures, and incorrect proportion of elements; however, the overall figural gestalt was generally preserved.  LEARNING AND MEMORY: Initial encoding of a word list over multiple learning trials was exceptionally low and declined. Delayed retrieval of the list was exceptionally low with no words recalled, consistent with prior testing. Recognition of the word list was exceptionally low with several false positive responses. Initial encoding of contextualized verbal information in the form of short stories was low average and stable, and delayed retrieval was below average and improved with some details recalled. Recognition of story details was below average. Encoding of visual information was average and delayed retrieval was exceptionally low with no figural details recalled, consistent with prior testing. Recognition among distractors was below average with only 1/7 correct responses.    PSYCHOLOGICAL AND BEHAVIORAL: She endorsed minimal symptoms of anxiety and depression on self-report questionnaires.   PERFORMANCE VALIDITY: Performance on neuropsychological tests is dependent upon a number of factors, including sufficient engagement and motivation, to reliably establish an examinee's level of cognitive functioning.  Based upon observations made throughout the evaluation, the patient did not appear to deliberately perform in a suboptimal manner and demonstrated good frustration tolerance on cognitively challenging tasks. Score on an imbedded index of performance validity was in the valid range. Overall, test results are  believed to accurately represent the patient's current neurocognitive status.    BEHAVIORAL OBSERVATIONS  Presented early and was accompanied by her son  Alert, attentive and focused while undergoing testing  Appearance: Well-groomed, casually dressed  Gait/Posture: No abnormalities noted  Sensorimotor: No abnormalities noted  Behavior: Cooperative, pleasant; slightly impulsive at times during testing  Speech: Conversational word finding difficulty noted; fluent, articulate, normal rate, prosody, and volume  Thought process: Logical, linear, and goal-directed  Thought content: Logical, appropriate  Affect: Broad, responsive, consistent with reported mood; good eye contact  Mood: Euthymic  Insight and Judgment: Fair  Approach to testing: Efficient, deliberate; good frustration on cognitively demanding tasks  Rapport: Easily established and maintained      Activities included in this evaluation: CPT Code #Units Time (min)   Psychiatric diagnostic interview [id number] --   Test evaluation services by professional; first hour. [id number] [phone]   Test evaluation services by professional, additional hour (+) [id number] 2    Test administration and scoring by technician, first 30 mins [id number] [phone]   Test administration and scoring by technician, additional 30 mins (+) [id number]    G31.84      ANANDA BROUSSARD, PhD

## 2024-06-12 NOTE — LETTER
2024       RE: Francisca Ramírez  [address]  [place] [address]     Dear Colleague,    Thank you for referring your patient, Francisca Ramírez, to the [facility] [place] at [facility]. Please see a copy of my visit note below.    NEUROPSYCHOLOGICAL EVALUATION  **CONFIDENTIAL**    **This is a medical document intended for communication with the referring provider. It is written in medical language and may contain abbreviations or verbiage that are unfamiliar. It may appear blunt or direct. This report and the results within are not intended to be interpreted in isolation without consultation with your medical provider. **      Name: Francisca Ramírez Education: 12 years    (age): 1950 (73 years) HUERTA:  2024   Referral: Alen Vega MD  Department of Family Medicine Handedness:  MRN (Epic): Right  [id number]     IDENTIFYING INFORMATION AND REASON FOR REFERRAL   Ms. Ramírez is a 72-year-old, right-handed, White female. This evaluation was requested to provide a comprehensive re-evaluation of her neuropsychological status to inform treatment planning.     IMPRESSION  See below for relevant background information and detailed test results. See separate abstract for supporting documentation including a list of neuropsychological measures and test scores.    Ms. Ramírez underwent neuropsychological testing on 5/15/2023 which revealed significant difficulty on tests of verbal and visual memory characterized by amnestic rapid forgetting of information and difficulties on select tests of executive functioning. Her current neuropsychological performance revealed a generally stable cognitive profile with a few improved and declined scores. Specifically, while recall of a story improved to the below average range, her learning of a word list declined from low average to exceptionally low range. Consistent with her prior  evaluation, she was unable to recall any words from the list or any figural details from a visual memory task following a delay and recognition scores across all memory tests were below expectation. Performances improved on tests of speeded word reading and knowledge of health and safety; however, scores declined on tests of mental flexibility, semantic verbal fluency, and spatial perception. Overall, scores fell below expectation on tests of learning and memory, semantic verbal fluency, and aspects of executive functioning including mental flexibly and planning/organization. Scores were within expectation on tests of attention/working memory, processing speed, naming, letter-cue verbal fluency, basic visuoconstruction, cognitive inhibition, and abstract reasoning. Assessment of psychological status revealed the absence of clinically significant depression or excessive anxiety symptoms. While findings suggest Ms. Ramírez likely experiences cognitive inefficiencies in her day-to-day life, she and her son again reported limited functional difficulties at present. Importantly, she has several cognitive strengths that will be beneficial to her in order to compensate for any cognitive weaknesses. Taken together, observed test performance and reported levels of day-to-day functioning continue to suggest a diagnosis of mild neurocognitive disorder (i.e., amnestic mild cognitive impairment). Amnestic MCI is highly associated with future development of Alzheimer-type dementia and ongoing monitoring of her cognitive and functional status over time is recommended.     DIAGNOSTIC IMPRESSIONS (ICD-10)  Amnestic mild cognitive impairment    RECOMMENDATIONS  Results from the present evaluation revealed significant memory impairment. It is recommended that trusted family continue to provide periodic oversight and monitoring for complex activities of daily living to ensure her safety and wellbeing are maintained. For example, her  son should continue to check that her medications are set up correctly and being taken daily.   Family members and providers may note that when communicating with Ms. Ramírez, she may benefit from novel information being broken down in small chunks and repeated across time to facilitate recall.  Additionally, we encourage Ms. Ramírez to be accompanied to all medical appointments to ensure the accurate exchange and recollection of information.  Ms. Ramírez is encouraged to continue to live a healthy lifestyle that promotes brain health, including getting appropriate exercise (as advised by her healthcare providers), getting regular sleep, and eating healthy.    Ms. Ramírez should continue to maintain a socially active lifestyle. She is likely to benefit from continuing to engage in social activities that she enjoys as these activities may offer cognitive stimulation, help to prevent feelings of depression, and provide emotional benefits that will maximize quality of life.   Ms. Ramírez is encouraged to continue utilizing behavioral strategies to maximize cognitive functioning in her daily life such as eliminating distractions, avoiding multitasking, engaging in mindfulness/relaxation, using external aids like lists and notes, and taking the time needed to learn and recall information.   The current evaluation will serve as another data point against which results of future evaluations may be compared. Ms. Ramírez may be referred for a follow-up neuropsychological evaluation in 12 months to objectively assess neurocognitive change, or when changes in functional status become apparent.   Thank you for the opportunity to participate in Ms. Ramírez's care.  These findings and recommendations were reviewed with the patient and her son, Wilmar, in a separate feedback session on 6/19/2024 and all their questions were answered. If you have any questions regarding this evaluation, please do not hesitate to contact  jonnie Marr, Ph.D.,   Clinical Neuropsychologist    RELEVANT BACKGROUND INFORMATION AND SUPPORTING DOCUMENTATION  Gathered from a clinical interview with the patient and her son, Wilmar, and reviews of electronic medical record.     History of Presenting Problem(s)  Ms. Ramírez underwent neuropsychological testing on 5/15/2023 which revealed significant difficulty on tests of verbal and visual memory characterized by amnestic rapid forgetting of information and difficulties on select tests of executive functioning, consistent with a diagnosis if mild neurocognitive disorder (amnestic mild cognitive impairment) in the setting of intact activities of daily living. She was started on donepezil after that evaluation. She and her son indicated her cognitive abilities may have improved since her prior evaluation. They described occasionally repeating questions and trouble recalling details of unimportant conversations, particularly when she is tired, but they denied other cognitive complaints. Functionally, they noted her ability to complete activities of daily living remain stable. Wilmar continues to assist with managing finances due to longstanding difficulties with organization, but she continues to be independent with other activities of daily living including managing appointments, meal preparation, and basic self-care. She only drives within ~2 miles of her home but she and her family denied any concerns about her driving. She generally manages her medications independently but her son double checks to make sure the box is set up correctly (they denied any errors) and he may remind her once per week to take them. She denied any falls, physical complaints, or sensorimotor disturbances and there have been no reported changes to her medical status in the past year. Emotionally, she denied any symptoms of depression, excessive anxiety, or psychological distress recently and there have been no notable  changes to her behavior or personality.     Neurodiagnostic Findings   None    Medical History  Microscopic colitis  Benign neoplasm of skin  Arthritis  Denied history of stroke, seizure, or head injury with LOC or persisting symptoms    Health Behaviors   Sleep: Denied sleep disturbance; denied snoring, gasping arousals, or parasomnic behaviors  Exercise: Walking daily  Pain: Occasional knee pain; denied pain during this evaluation    Psychiatric History  Ms. Ramírez reported current mood is  good   History is notable for depression that has been well managed with medication for many years; she denied any recent symptoms of depression and stated she very rarely feels down.  She otherwise denied history of hypomanic or manic mood symptoms, excessive anxiety or uncontrollable worry, compulsive behaviors, alteration of sensory perceptual processes or disordered thought.  Suicidality/ Self-harm: She denied any suicidal ideation, plan, or intent  Psychiatric treatment: Currently prescribed venlafaxine    Substance Use History  Alcohol: None  Nicotine: None  Cannabis: None  Problematic Substance Use: Denied    Current Medications (per patient report)  venlafaxine  simvastatin  donepezil  vitamin D    Educational, & Occupational History  Childhood behavioral / emotional / academic problems: Reported math was difficult, but she did not receive any assistance or accommodations  Native Language: English  Education: Described self as an average student; graduated high school on time; may have taken a couple of courses in vocational school  Occupation: ; customer service; 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 Descriptive ranges are based on American Academy of Clinical Neuropsychology (2020) consensus guidelines, or test manuals where appropriate. All standardized scores are adjusted for age and additional adjustments for demographic factors such as education were performed where applicable. Results were compared to her prior evaluation on 5/15/2023.    PRE-MORBID ABILITY: Premorbid abilities were estimated within the average range based on single word reading ability assessed during her prior evaluation.   GENERAL COGNITIVE STATUS: Orientation was within expectation for place. She was not fully oriented to general personal information (incorrectly stating her age as  74 ), or time (2 months and 2 days off on the date ( April 10 ). She was able to name the current and the most recent [place] Presidents. Within the practical domain, performance was within expectation on a measure of conceptual understanding of issues pertaining to health and safety, reflecting an improvement compared to prior testing. Her score was consistent with individuals functioning at a high level of independence in the community.  ATTENTION/EXECUTIVE FUNCTIONS: Immediate auditory attention and working memory performances were average and stable. Verbal abstract reasoning performance was average and stable. Visual reasoning through pattern identification was low average and stable. Response inhibition performance was low average and stable. Performance on a test of set-shifting/cognitive flexibility was discontinued due to inability to complete the task with several errors, representing a decline. Copy of a complex figure was below average and stable but again notable for a disorganized and piecemeal approach with some inattention to detail. Psychomotor processing speed performances were average and  stable.  LANGUAGE: Confrontation naming performance was average and stable. Letter-cue verbal fluency performance was average and stable. Semantic verbal fluency performance was exceptionally low and declined. Of note, she appeared to lose track of task instructions saying only words in the category that start with a certain letter.  VISUOSPATIAL PROCESSING: Performance on a spatial perception task requiring judgement of angled lines was exceptionally low and declined. Copy of increasingly intricate designs was average and stable. Copy of a complex figure was below average and notable for extra and misplaced lines, poor line closures, and incorrect proportion of elements; however, the overall figural gestalt was generally preserved.  LEARNING AND MEMORY: Initial encoding of a word list over multiple learning trials was exceptionally low and declined. Delayed retrieval of the list was exceptionally low with no words recalled, consistent with prior testing. Recognition of the word list was exceptionally low with several false positive responses. Initial encoding of contextualized verbal information in the form of short stories was low average and stable, and delayed retrieval was below average and improved with some details recalled. Recognition of story details was below average. Encoding of visual information was average and delayed retrieval was exceptionally low with no figural details recalled, consistent with prior testing. Recognition among distractors was below average with only 1/7 correct responses.    PSYCHOLOGICAL AND BEHAVIORAL: She endorsed minimal symptoms of anxiety and depression on self-report questionnaires.   PERFORMANCE VALIDITY: Performance on neuropsychological tests is dependent upon a number of factors, including sufficient engagement and motivation, to reliably establish an examinee's level of cognitive functioning.  Based upon observations made throughout the evaluation, the patient did not  appear to deliberately perform in a suboptimal manner and demonstrated good frustration tolerance on cognitively challenging tasks. Score on an imbedded index of performance validity was in the valid range. Overall, test results are believed to accurately represent the patient's current neurocognitive status.    BEHAVIORAL OBSERVATIONS  Presented early and was accompanied by her son  Alert, attentive and focused while undergoing testing  Appearance: Well-groomed, casually dressed  Gait/Posture: No abnormalities noted  Sensorimotor: No abnormalities noted  Behavior: Cooperative, pleasant; slightly impulsive at times during testing  Speech: Conversational word finding difficulty noted; fluent, articulate, normal rate, prosody, and volume  Thought process: Logical, linear, and goal-directed  Thought content: Logical, appropriate  Affect: Broad, responsive, consistent with reported mood; good eye contact  Mood: Euthymic  Insight and Judgment: Fair  Approach to testing: Efficient, deliberate; good frustration on cognitively demanding tasks  Rapport: Easily established and maintained      Activities included in this evaluation: CPT Code #Units Time (min)   Psychiatric diagnostic interview 93603 1 --   Test evaluation services by professional; first hour. [id number]   Test evaluation services by professional, additional hour (+) [id number] 2    Test administration and scoring by technician, first 30 mins [id number] [phone]   Test administration and scoring by technician, additional 30 mins (+) [id number]    G31.84

## 2024-06-12 NOTE — LETTER
2024      RE: Francisca Ramírez  [address] [facility]  [place] [address]       NEUROPSYCHOLOGICAL EVALUATION  **CONFIDENTIAL**    **This is a medical document intended for communication with the referring provider. It is written in medical language and may contain abbreviations or verbiage that are unfamiliar. It may appear blunt or direct. This report and the results within are not intended to be interpreted in isolation without consultation with your medical provider. **      Name: Francisca Ramírez Education: 12 years    (age): 1950 (73 years) HUERTA:  2024   Referral: Alen Vega MD  Department of Family Medicine Handedness:  MRN (Epic): Right  [id number]     IDENTIFYING INFORMATION AND REASON FOR REFERRAL   Ms. Ramírez is a 72-year-old, right-handed, White female. This evaluation was requested to provide a comprehensive re-evaluation of her neuropsychological status to inform treatment planning.     IMPRESSION  See below for relevant background information and detailed test results. See separate abstract for supporting documentation including a list of neuropsychological measures and test scores.    Ms. Ramírez underwent neuropsychological testing on 5/15/2023 which revealed significant difficulty on tests of verbal and visual memory characterized by amnestic rapid forgetting of information and difficulties on select tests of executive functioning. Her current neuropsychological performance revealed a generally stable cognitive profile with a few improved and declined scores. Specifically, while recall of a story improved to the below average range, her learning of a word list declined from low average to exceptionally low range. Consistent with her prior evaluation, she was unable to recall any words from the list or any figural details from a visual memory task following a delay and recognition scores across all memory tests were below expectation. Performances improved on tests of speeded  word reading and knowledge of health and safety; however, scores declined on tests of mental flexibility, semantic verbal fluency, and spatial perception. Overall, scores fell below expectation on tests of learning and memory, semantic verbal fluency, and aspects of executive functioning including mental flexibly and planning/organization. Scores were within expectation on tests of attention/working memory, processing speed, naming, letter-cue verbal fluency, basic visuoconstruction, cognitive inhibition, and abstract reasoning. Assessment of psychological status revealed the absence of clinically significant depression or excessive anxiety symptoms. While findings suggest Ms. Ramírez likely experiences cognitive inefficiencies in her day-to-day life, she and her son again reported limited functional difficulties at present. Importantly, she has several cognitive strengths that will be beneficial to her in order to compensate for any cognitive weaknesses. Taken together, observed test performance and reported levels of day-to-day functioning continue to suggest a diagnosis of mild neurocognitive disorder (i.e., amnestic mild cognitive impairment). Amnestic MCI is highly associated with future development of Alzheimer-type dementia and ongoing monitoring of her cognitive and functional status over time is recommended.     DIAGNOSTIC IMPRESSIONS (ICD-10)  Amnestic mild cognitive impairment    RECOMMENDATIONS  Results from the present evaluation revealed significant memory impairment. It is recommended that trusted family continue to provide periodic oversight and monitoring for complex activities of daily living to ensure her safety and wellbeing are maintained. For example, her son should continue to check that her medications are set up correctly and being taken daily.   Family members and providers may note that when communicating with Ms. Ramírez, she may benefit from novel information being broken down in  small chunks and repeated across time to facilitate recall.  Additionally, we encourage Ms. Ramírez to be accompanied to all medical appointments to ensure the accurate exchange and recollection of information.  Ms. Ramírez is encouraged to continue to live a healthy lifestyle that promotes brain health, including getting appropriate exercise (as advised by her healthcare providers), getting regular sleep, and eating healthy.    Ms. Ramírez should continue to maintain a socially active lifestyle. She is likely to benefit from continuing to engage in social activities that she enjoys as these activities may offer cognitive stimulation, help to prevent feelings of depression, and provide emotional benefits that will maximize quality of life.   Ms. Ramírez is encouraged to continue utilizing behavioral strategies to maximize cognitive functioning in her daily life such as eliminating distractions, avoiding multitasking, engaging in mindfulness/relaxation, using external aids like lists and notes, and taking the time needed to learn and recall information.   The current evaluation will serve as another data point against which results of future evaluations may be compared. Ms. Ramírez may be referred for a follow-up neuropsychological evaluation in 12 months to objectively assess neurocognitive change, or when changes in functional status become apparent.   Thank you for the opportunity to participate in Ms. Ramírez's care.  These findings and recommendations were reviewed with the patient and her son, Wilmar, in a separate feedback session on 6/19/2024 and all their questions were answered. If you have any questions regarding this evaluation, please do not hesitate to contact me.       Mayela Marr, Ph.D.,   Clinical Neuropsychologist    RELEVANT BACKGROUND INFORMATION AND SUPPORTING DOCUMENTATION  Gathered from a clinical interview with the patient and her son, Wilmar, and reviews of electronic medical  record.     History of Presenting Problem(s)  Ms. Ramírez underwent neuropsychological testing on 5/15/2023 which revealed significant difficulty on tests of verbal and visual memory characterized by amnestic rapid forgetting of information and difficulties on select tests of executive functioning, consistent with a diagnosis if mild neurocognitive disorder (amnestic mild cognitive impairment) in the setting of intact activities of daily living. She was started on donepezil after that evaluation. She and her son indicated her cognitive abilities may have improved since her prior evaluation. They described occasionally repeating questions and trouble recalling details of unimportant conversations, particularly when she is tired, but they denied other cognitive complaints. Functionally, they noted her ability to complete activities of daily living remain stable. Wilmar continues to assist with managing finances due to longstanding difficulties with organization, but she continues to be independent with other activities of daily living including managing appointments, meal preparation, and basic self-care. She only drives within ~2 miles of her home but she and her family denied any concerns about her driving. She generally manages her medications independently but her son double checks to make sure the box is set up correctly (they denied any errors) and he may remind her once per week to take them. She denied any falls, physical complaints, or sensorimotor disturbances and there have been no reported changes to her medical status in the past year. Emotionally, she denied any symptoms of depression, excessive anxiety, or psychological distress recently and there have been no notable changes to her behavior or personality.     Neurodiagnostic Findings   None    Medical History  Microscopic colitis  Benign neoplasm of skin  Arthritis  Denied history of stroke, seizure, or head injury with LOC or persisting  symptoms    Health Behaviors   Sleep: Denied sleep disturbance; denied snoring, gasping arousals, or parasomnic behaviors  Exercise: Walking daily  Pain: Occasional knee pain; denied pain during this evaluation    Psychiatric History  Ms. Ramírez reported current mood is  good   History is notable for depression that has been well managed with medication for many years; she denied any recent symptoms of depression and stated she very rarely feels down.  She otherwise denied history of hypomanic or manic mood symptoms, excessive anxiety or uncontrollable worry, compulsive behaviors, alteration of sensory perceptual processes or disordered thought.  Suicidality/ Self-harm: She denied any suicidal ideation, plan, or intent  Psychiatric treatment: Currently prescribed venlafaxine    Substance Use History  Alcohol: None  Nicotine: None  Cannabis: None  Problematic Substance Use: Denied    Current Medications (per patient report)  venlafaxine  simvastatin  donepezil  vitamin D    Educational, & Occupational History  Childhood behavioral / emotional / academic problems: Reported math was difficult, but she did not receive any assistance or accommodations  Native Language: English  Education: Described self as an average student; graduated high school on time; may have taken a couple of courses in vocational school  Occupation: ; customer service; 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 Descriptive ranges are based on American Academy of Clinical Neuropsychology (2020)  consensus guidelines, or test manuals where appropriate. All standardized scores are adjusted for age and additional adjustments for demographic factors such as education were performed where applicable. Results were compared to her prior evaluation on 5/15/2023.    PRE-MORBID ABILITY: Premorbid abilities were estimated within the average range based on single word reading ability assessed during her prior evaluation.   GENERAL COGNITIVE STATUS: Orientation was within expectation for place. She was not fully oriented to general personal information (incorrectly stating her age as  74 ), or time (2 months and 2 days off on the date ( April 10 ). She was able to name the current and the most recent [place] Presidents. Within the practical domain, performance was within expectation on a measure of conceptual understanding of issues pertaining to health and safety, reflecting an improvement compared to prior testing. Her score was consistent with individuals functioning at a high level of independence in the community.  ATTENTION/EXECUTIVE FUNCTIONS: Immediate auditory attention and working memory performances were average and stable. Verbal abstract reasoning performance was average and stable. Visual reasoning through pattern identification was low average and stable. Response inhibition performance was low average and stable. Performance on a test of set-shifting/cognitive flexibility was discontinued due to inability to complete the task with several errors, representing a decline. Copy of a complex figure was below average and stable but again notable for a disorganized and piecemeal approach with some inattention to detail. Psychomotor processing speed performances were average and stable.  LANGUAGE: Confrontation naming performance was average and stable. Letter-cue verbal fluency performance was average and stable. Semantic verbal fluency performance was exceptionally low and declined. Of note, she appeared to lose  track of task instructions saying only words in the category that start with a certain letter.  VISUOSPATIAL PROCESSING: Performance on a spatial perception task requiring judgement of angled lines was exceptionally low and declined. Copy of increasingly intricate designs was average and stable. Copy of a complex figure was below average and notable for extra and misplaced lines, poor line closures, and incorrect proportion of elements; however, the overall figural gestalt was generally preserved.  LEARNING AND MEMORY: Initial encoding of a word list over multiple learning trials was exceptionally low and declined. Delayed retrieval of the list was exceptionally low with no words recalled, consistent with prior testing. Recognition of the word list was exceptionally low with several false positive responses. Initial encoding of contextualized verbal information in the form of short stories was low average and stable, and delayed retrieval was below average and improved with some details recalled. Recognition of story details was below average. Encoding of visual information was average and delayed retrieval was exceptionally low with no figural details recalled, consistent with prior testing. Recognition among distractors was below average with only 1/7 correct responses.    PSYCHOLOGICAL AND BEHAVIORAL: She endorsed minimal symptoms of anxiety and depression on self-report questionnaires.   PERFORMANCE VALIDITY: Performance on neuropsychological tests is dependent upon a number of factors, including sufficient engagement and motivation, to reliably establish an examinee's level of cognitive functioning.  Based upon observations made throughout the evaluation, the patient did not appear to deliberately perform in a suboptimal manner and demonstrated good frustration tolerance on cognitively challenging tasks. Score on an imbedded index of performance validity was in the valid range. Overall, test results are  believed to accurately represent the patient's current neurocognitive status.    BEHAVIORAL OBSERVATIONS  Presented early and was accompanied by her son  Alert, attentive and focused while undergoing testing  Appearance: Well-groomed, casually dressed  Gait/Posture: No abnormalities noted  Sensorimotor: No abnormalities noted  Behavior: Cooperative, pleasant; slightly impulsive at times during testing  Speech: Conversational word finding difficulty noted; fluent, articulate, normal rate, prosody, and volume  Thought process: Logical, linear, and goal-directed  Thought content: Logical, appropriate  Affect: Broad, responsive, consistent with reported mood; good eye contact  Mood: Euthymic  Insight and Judgment: Fair  Approach to testing: Efficient, deliberate; good frustration on cognitively demanding tasks  Rapport: Easily established and maintained      Activities included in this evaluation: CPT Code #Units Time (min)   Psychiatric diagnostic interview [id number] --   Test evaluation services by professional; first hour. [id number] [id number]   Test evaluation services by professional, additional hour (+) [id number] 2    Test administration and scoring by technician, first 30 mins [id number] [phone]   Test administration and scoring by technician, additional 30 mins (+) [id number]    G31.84      ANANDA BROUSSARD, PhD

## 2024-06-13 NOTE — NURSING NOTE
Pt was seen for neuropsychological evaluation at the request of Alen Vega MD for the purposes of diagnostic clarification and treatment planning. 209 minutes of test administration and scoring were provided by this writer. Please see Dr. Mayela Marr's report for a full interpretation of the findings.    Sydni Silva  Psychometrist

## 2024-06-19 NOTE — PROGRESS NOTES
Tech:       Patient Name: Francisca Ramírez      : [birth year]      MRN: [id number]      HUERTA: 24      Age: 73      Education: 12      Ethnicity: W      Handedness: Right      Station: OP             NEUROPSYCHOLOGICAL TESTS              ATTENTION AND EXECUTIVE FUNCTIONS RAW SCORE STANDARDIZED SCORE* DESCRIPTIVE RANGE**   Wechsler Adult Intelligence Scale - 4th Edition (WAIS-IV)      Digit Span Forward 10 ss= 10 Average   Digit Span Backward 6 ss= 8 Average   Digit Span Sequencing 8 ss= 11 Average   Digit Span Total 24 ss= 10 Average   Coding 45 ss= 10 Average   Similarities 21 ss= 9 Average   Matrix Reasoning 5 ss= 6 Low Average   Trail Making Test (Time/errors)        Part A s,r,e 26/0 TS= 61 High Average   Part B s,r,e DC@206/5 ?   Exceptionally Low   Stroop       Word e 84 TS= 47? Average   Color e 47 TS= 41 Low Average   Color/Word e 12 TS= 33 Below Average   Interference e -17 TS= 39 Low Average   Independent Living Scales (ILS)       Health and Safety 32 TS= 42? Moderate          LANGUAGE RAW SCORE STANDARDIZED SCORE* DESCRIPTIVE RANGE**   [place] Naming Test s,r,e 49 TS= 46 Average   Letter-Cue Verbal Fluency s,r,e 15-8-14 (37) TS= 47 Average   Animal Fluency s,r,e 8 TS=  25? Exceptionally Low          VISUOSPATIAL PROCESSING RAW SCORE STANDARDIZED SCORE* DESCRIPTIVE RANGE**   Marcio Complex Figure Test (RCFT) Copy 26.5 %ile= 6-10 Below Average-Low Average   Repeatable Battery for the Assessment of Neuropsychological Status (RBANS; Form B)    Line Orientation 9 %ile= <2? Exceptionally Low   WMS-IV Visual Reproduction Copy 42 %= 51-75 Average          LEARNING & MEMORY RAW SCORE STANDARDIZED SCORE* DESCRIPTIVE RANGE**   Wechsler Memory Scale-4th Edition (WMS-IV)       Logical Memory I 18 ss= 6 Low Average   Logical Memory II 4 ss= 5? Below Average   Logical Memory Recognition 12 %= 3-9 Below Average   Visual Reproduction I 23 ss= 8 Average   Visual Reproduction II 0 ss= 2 Exceptionally Low   Visual  Reproduction Recognition 1 %= 3-9 Below Average   Martin Verbal Learning Test - Revised (HVLT-R; Form 3)      Total Trials 1-3 1-3-4 (8) TS= 21? Exceptionally Low   Delayed Recall 0 TS= <20 Exceptionally Low   Retention (%) 0% TS= <20 Exceptionally Low   Recognition Hits 8 --     Recognition False Alarms 8 --     Recognition Discriminability 0 TS= <20 Exceptionally Low          PSYCHOLOGICAL & BEHAVIORAL SYMPTOMS RAW SCORE STANDARDIZED SCORE* DESCRIPTIVE RANGE**   Geriatric Depression Scale (GDS) 3 --  Minimal   Geriatric Anxiey Scale (GAS)       Somatic 4 --  Minimal   Cognitive 2 --  Minimal   Affective 1 --  Minimal   Total 7 --  Minimal          PERFORMANCE VALIDITY RAW SCORE   DESCRIPTIVE RANGE**   RDS 8 --  Valid Range          * All standardized scores are adjusted for age. Superscripts denote additional adjustment for (s)ex, (r)ace/ethnicity, (l)anguage, and/or (e)ducation.   ** Descriptive ranges are based on American Academy of Clinical Neuropsychology (2020) consensus guidelines, or test manuals where appropriate.    WNL = within normal limits. DC = discontinued due to patient s inability to complete the test.      Standardized scores: TS = T-score; mean = 50, standard deviation =10; z = z-score; mean = 0, standard deviation = 1; ss = scaled score; mean = 10, standard deviation = 3; MAS = [place] Older Adult Normative Study age adjusted scaled score; mean = 10, standard deviation = 3; SS = standard score; mean = 100, standard deviation = 15.   ??Arrows denote improvement or decline in scores relative to prior testing.

## 2024-06-19 NOTE — PROGRESS NOTES
NEUROPSYCHOLOGICAL EVALUATION  **CONFIDENTIAL**    **This is a medical document intended for communication with the referring provider. It is written in medical language and may contain abbreviations or verbiage that are unfamiliar. It may appear blunt or direct. This report and the results within are not intended to be interpreted in isolation without consultation with your medical provider. **      Name: Francisca Ramírez Education: 12 years    (age): 1950 (73 years) HUERTA:  2024   Referral: Alen Vega MD  Department of Family Medicine Handedness:  MRN (Epic): Right  [id number]     IDENTIFYING INFORMATION AND REASON FOR REFERRAL   Ms. Ramírez is a 72-year-old, right-handed, White female. This evaluation was requested to provide a comprehensive re-evaluation of her neuropsychological status to inform treatment planning.     IMPRESSION  See below for relevant background information and detailed test results. See separate abstract for supporting documentation including a list of neuropsychological measures and test scores.    Ms. Ramírez underwent neuropsychological testing on 5/15/2023 which revealed significant difficulty on tests of verbal and visual memory characterized by amnestic rapid forgetting of information and difficulties on select tests of executive functioning. Her current neuropsychological performance revealed a generally stable cognitive profile with a few improved and declined scores. Specifically, while recall of a story improved to the below average range, her learning of a word list declined from low average to exceptionally low range. Consistent with her prior evaluation, she was unable to recall any words from the list or any figural details from a visual memory task following a delay and recognition scores across all memory tests were below expectation. Performances improved on tests of speeded word reading and knowledge of health and safety; however, scores declined on tests of  mental flexibility, semantic verbal fluency, and spatial perception. Overall, scores fell below expectation on tests of learning and memory, semantic verbal fluency, and aspects of executive functioning including mental flexibly and planning/organization. Scores were within expectation on tests of attention/working memory, processing speed, naming, letter-cue verbal fluency, basic visuoconstruction, cognitive inhibition, and abstract reasoning. Assessment of psychological status revealed the absence of clinically significant depression or excessive anxiety symptoms. While findings suggest Ms. Ramírez likely experiences cognitive inefficiencies in her day-to-day life, she and her son again reported limited functional difficulties at present. Importantly, she has several cognitive strengths that will be beneficial to her in order to compensate for any cognitive weaknesses. Taken together, observed test performance and reported levels of day-to-day functioning continue to suggest a diagnosis of mild neurocognitive disorder (i.e., amnestic mild cognitive impairment). Amnestic MCI is highly associated with future development of Alzheimer-type dementia and ongoing monitoring of her cognitive and functional status over time is recommended.     DIAGNOSTIC IMPRESSIONS (ICD-10)  Amnestic mild cognitive impairment    RECOMMENDATIONS  Results from the present evaluation revealed significant memory impairment. It is recommended that trusted family continue to provide periodic oversight and monitoring for complex activities of daily living to ensure her safety and wellbeing are maintained. For example, her son should continue to check that her medications are set up correctly and being taken daily.   Family members and providers may note that when communicating with Ms. Ramírez, she may benefit from novel information being broken down in small chunks and repeated across time to facilitate recall.  Additionally, we encourage  Ms. Ramírez to be accompanied to all medical appointments to ensure the accurate exchange and recollection of information.  Ms. Ramírez is encouraged to continue to live a healthy lifestyle that promotes brain health, including getting appropriate exercise (as advised by her healthcare providers), getting regular sleep, and eating healthy.    Ms. Ramírez should continue to maintain a socially active lifestyle. She is likely to benefit from continuing to engage in social activities that she enjoys as these activities may offer cognitive stimulation, help to prevent feelings of depression, and provide emotional benefits that will maximize quality of life.   Ms. Ramírez is encouraged to continue utilizing behavioral strategies to maximize cognitive functioning in her daily life such as eliminating distractions, avoiding multitasking, engaging in mindfulness/relaxation, using external aids like lists and notes, and taking the time needed to learn and recall information.   The current evaluation will serve as another data point against which results of future evaluations may be compared. Ms. Ramírez may be referred for a follow-up neuropsychological evaluation in 12 months to objectively assess neurocognitive change, or when changes in functional status become apparent.   Thank you for the opportunity to participate in Ms. Ramírez's care.  These findings and recommendations were reviewed with the patient and her son, Wilmar, in a separate feedback session on 6/19/2024 and all their questions were answered. If you have any questions regarding this evaluation, please do not hesitate to contact me.       Mayela Marr, Ph.D.,   Clinical Neuropsychologist    RELEVANT BACKGROUND INFORMATION AND SUPPORTING DOCUMENTATION  Gathered from a clinical interview with the patient and her son, Wilmar, and reviews of electronic medical record.     History of Presenting Problem(s)  Ms. Ramírez underwent neuropsychological  testing on 5/15/2023 which revealed significant difficulty on tests of verbal and visual memory characterized by amnestic rapid forgetting of information and difficulties on select tests of executive functioning, consistent with a diagnosis if mild neurocognitive disorder (amnestic mild cognitive impairment) in the setting of intact activities of daily living. She was started on donepezil after that evaluation. She and her son indicated her cognitive abilities may have improved since her prior evaluation. They described occasionally repeating questions and trouble recalling details of unimportant conversations, particularly when she is tired, but they denied other cognitive complaints. Functionally, they noted her ability to complete activities of daily living remain stable. Wilmar continues to assist with managing finances due to longstanding difficulties with organization, but she continues to be independent with other activities of daily living including managing appointments, meal preparation, and basic self-care. She only drives within ~2 miles of her home but she and her family denied any concerns about her driving. She generally manages her medications independently but her son double checks to make sure the box is set up correctly (they denied any errors) and he may remind her once per week to take them. She denied any falls, physical complaints, or sensorimotor disturbances and there have been no reported changes to her medical status in the past year. Emotionally, she denied any symptoms of depression, excessive anxiety, or psychological distress recently and there have been no notable changes to her behavior or personality.     Neurodiagnostic Findings   None    Medical History  Microscopic colitis  Benign neoplasm of skin  Arthritis  Denied history of stroke, seizure, or head injury with LOC or persisting symptoms    Health Behaviors   Sleep: Denied sleep disturbance; denied snoring, gasping arousals, or  parasomnic behaviors  Exercise: Walking daily  Pain: Occasional knee pain; denied pain during this evaluation    Psychiatric History  Ms. Ramírez reported current mood is  good   History is notable for depression that has been well managed with medication for many years; she denied any recent symptoms of depression and stated she very rarely feels down.  She otherwise denied history of hypomanic or manic mood symptoms, excessive anxiety or uncontrollable worry, compulsive behaviors, alteration of sensory perceptual processes or disordered thought.  Suicidality/ Self-harm: She denied any suicidal ideation, plan, or intent  Psychiatric treatment: Currently prescribed venlafaxine    Substance Use History  Alcohol: None  Nicotine: None  Cannabis: None  Problematic Substance Use: Denied    Current Medications (per patient report)  venlafaxine  simvastatin  donepezil  vitamin D    Educational, & Occupational History  Childhood behavioral / emotional / academic problems: Reported math was difficult, but she did not receive any assistance or accommodations  Native Language: English  Education: Described self as an average student; graduated high school on time; may have taken a couple of courses in vocational school  Occupation: ; customer service; 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 Descriptive ranges are based on American Academy of Clinical Neuropsychology (2020) consensus guidelines, or test manuals where appropriate. All standardized scores are adjusted for age  and additional adjustments for demographic factors such as education were performed where applicable. Results were compared to her prior evaluation on 5/15/2023.    PRE-MORBID ABILITY: Premorbid abilities were estimated within the average range based on single word reading ability assessed during her prior evaluation.   GENERAL COGNITIVE STATUS: Orientation was within expectation for place. She was not fully oriented to general personal information (incorrectly stating her age as  74 ), or time (2 months and 2 days off on the date ( April 10 ). She was able to name the current and the most recent [place] Presidents. Within the practical domain, performance was within expectation on a measure of conceptual understanding of issues pertaining to health and safety, reflecting an improvement compared to prior testing. Her score was consistent with individuals functioning at a high level of independence in the community.  ATTENTION/EXECUTIVE FUNCTIONS: Immediate auditory attention and working memory performances were average and stable. Verbal abstract reasoning performance was average and stable. Visual reasoning through pattern identification was low average and stable. Response inhibition performance was low average and stable. Performance on a test of set-shifting/cognitive flexibility was discontinued due to inability to complete the task with several errors, representing a decline. Copy of a complex figure was below average and stable but again notable for a disorganized and piecemeal approach with some inattention to detail. Psychomotor processing speed performances were average and stable.  LANGUAGE: Confrontation naming performance was average and stable. Letter-cue verbal fluency performance was average and stable. Semantic verbal fluency performance was exceptionally low and declined. Of note, she appeared to lose track of task instructions saying only words in the category that start with a certain  letter.  VISUOSPATIAL PROCESSING: Performance on a spatial perception task requiring judgement of angled lines was exceptionally low and declined. Copy of increasingly intricate designs was average and stable. Copy of a complex figure was below average and notable for extra and misplaced lines, poor line closures, and incorrect proportion of elements; however, the overall figural gestalt was generally preserved.  LEARNING AND MEMORY: Initial encoding of a word list over multiple learning trials was exceptionally low and declined. Delayed retrieval of the list was exceptionally low with no words recalled, consistent with prior testing. Recognition of the word list was exceptionally low with several false positive responses. Initial encoding of contextualized verbal information in the form of short stories was low average and stable, and delayed retrieval was below average and improved with some details recalled. Recognition of story details was below average. Encoding of visual information was average and delayed retrieval was exceptionally low with no figural details recalled, consistent with prior testing. Recognition among distractors was below average with only 1/7 correct responses.    PSYCHOLOGICAL AND BEHAVIORAL: She endorsed minimal symptoms of anxiety and depression on self-report questionnaires.   PERFORMANCE VALIDITY: Performance on neuropsychological tests is dependent upon a number of factors, including sufficient engagement and motivation, to reliably establish an examinee's level of cognitive functioning.  Based upon observations made throughout the evaluation, the patient did not appear to deliberately perform in a suboptimal manner and demonstrated good frustration tolerance on cognitively challenging tasks. Score on an imbedded index of performance validity was in the valid range. Overall, test results are believed to accurately represent the patient's current neurocognitive status.    BEHAVIORAL  OBSERVATIONS  Presented early and was accompanied by her son  Alert, attentive and focused while undergoing testing  Appearance: Well-groomed, casually dressed  Gait/Posture: No abnormalities noted  Sensorimotor: No abnormalities noted  Behavior: Cooperative, pleasant; slightly impulsive at times during testing  Speech: Conversational word finding difficulty noted; fluent, articulate, normal rate, prosody, and volume  Thought process: Logical, linear, and goal-directed  Thought content: Logical, appropriate  Affect: Broad, responsive, consistent with reported mood; good eye contact  Mood: Euthymic  Insight and Judgment: Fair  Approach to testing: Efficient, deliberate; good frustration on cognitively demanding tasks  Rapport: Easily established and maintained      Activities included in this evaluation: CPT Code #Units Time (min)   Psychiatric diagnostic interview 23858 1 --   Test evaluation services by professional; first hour. [id number] 1 155   Test evaluation services by professional, additional hour (+) [id number] 2    Test administration and scoring by technician, first 30 mins [id number] [id number]   Test administration and scoring by technician, additional 30 mins (+) [id number] 6    G31.84

## 2024-07-03 ENCOUNTER — ANCILLARY PROCEDURE (OUTPATIENT)
Dept: MAMMOGRAPHY | Facility: CLINIC | Age: 74
End: 2024-07-03
Attending: FAMILY MEDICINE
Payer: COMMERCIAL

## 2024-07-03 DIAGNOSIS — Z12.31 SCREENING MAMMOGRAM, ENCOUNTER FOR: ICD-10-CM

## 2024-07-03 PROCEDURE — 77067 SCR MAMMO BI INCL CAD: CPT | Mod: TC | Performed by: RADIOLOGY

## 2024-07-03 PROCEDURE — 77063 BREAST TOMOSYNTHESIS BI: CPT | Mod: TC | Performed by: RADIOLOGY

## 2024-07-12 DIAGNOSIS — R41.89 COGNITIVE IMPAIRMENT: ICD-10-CM

## 2024-07-12 RX ORDER — DONEPEZIL HYDROCHLORIDE 5 MG/1
5 TABLET, FILM COATED ORAL AT BEDTIME
Qty: 90 TABLET | Refills: 1 | Status: SHIPPED | OUTPATIENT
Start: 2024-07-12 | End: 2024-08-27

## 2024-08-23 DIAGNOSIS — E78.5 HYPERLIPIDEMIA LDL GOAL <130: ICD-10-CM

## 2024-08-23 RX ORDER — SIMVASTATIN 40 MG
TABLET ORAL
Qty: 90 TABLET | Refills: 3 | Status: SHIPPED | OUTPATIENT
Start: 2024-08-23

## 2024-08-27 ENCOUNTER — OFFICE VISIT (OUTPATIENT)
Dept: FAMILY MEDICINE | Facility: CLINIC | Age: 74
End: 2024-08-27
Payer: COMMERCIAL

## 2024-08-27 VITALS
SYSTOLIC BLOOD PRESSURE: 124 MMHG | RESPIRATION RATE: 15 BRPM | HEIGHT: 67 IN | HEART RATE: 84 BPM | DIASTOLIC BLOOD PRESSURE: 70 MMHG | OXYGEN SATURATION: 97 % | BODY MASS INDEX: 32.33 KG/M2 | TEMPERATURE: 98.3 F | WEIGHT: 206 LBS

## 2024-08-27 DIAGNOSIS — E78.5 HYPERLIPIDEMIA LDL GOAL <130: ICD-10-CM

## 2024-08-27 DIAGNOSIS — F43.0 ACUTE REACTION TO STRESS: ICD-10-CM

## 2024-08-27 DIAGNOSIS — R73.01 IMPAIRED FASTING GLUCOSE: ICD-10-CM

## 2024-08-27 DIAGNOSIS — Z00.01 ENCOUNTER FOR GENERAL ADULT MEDICAL EXAMINATION WITH ABNORMAL FINDINGS: Primary | ICD-10-CM

## 2024-08-27 DIAGNOSIS — B07.0 VERRUCA PLANTARIS: ICD-10-CM

## 2024-08-27 DIAGNOSIS — L82.1 SEBORRHEIC KERATOSES: ICD-10-CM

## 2024-08-27 DIAGNOSIS — R41.89 COGNITIVE IMPAIRMENT: ICD-10-CM

## 2024-08-27 LAB
ALBUMIN SERPL BCG-MCNC: 4.1 G/DL (ref 3.5–5.2)
ALP SERPL-CCNC: 77 U/L (ref 40–150)
ALT SERPL W P-5'-P-CCNC: 15 U/L (ref 0–70)
ANION GAP SERPL CALCULATED.3IONS-SCNC: 10 MMOL/L (ref 7–15)
AST SERPL W P-5'-P-CCNC: 33 U/L (ref 0–45)
BILIRUB SERPL-MCNC: 0.6 MG/DL
BUN SERPL-MCNC: 14.6 MG/DL (ref 8–23)
CALCIUM SERPL-MCNC: 9.5 MG/DL (ref 8.8–10.4)
CHLORIDE SERPL-SCNC: 103 MMOL/L (ref 98–107)
CHOLEST SERPL-MCNC: 201 MG/DL
CREAT SERPL-MCNC: 0.81 MG/DL (ref 0.51–1.17)
EGFRCR SERPLBLD CKD-EPI 2021: 75 ML/MIN/1.73M2
ERYTHROCYTE [DISTWIDTH] IN BLOOD BY AUTOMATED COUNT: 12.3 % (ref 10–15)
FASTING STATUS PATIENT QL REPORTED: YES
FASTING STATUS PATIENT QL REPORTED: YES
GLUCOSE SERPL-MCNC: 96 MG/DL (ref 70–99)
HCO3 SERPL-SCNC: 28 MMOL/L (ref 22–29)
HCT VFR BLD AUTO: 44.4 % (ref 35–53)
HDLC SERPL-MCNC: 64 MG/DL
HGB BLD-MCNC: 14.6 G/DL (ref 11.7–17.7)
LDLC SERPL CALC-MCNC: 107 MG/DL
MCH RBC QN AUTO: 32.9 PG (ref 26.5–33)
MCHC RBC AUTO-ENTMCNC: 32.9 G/DL (ref 31.5–36.5)
MCV RBC AUTO: 100 FL (ref 78–100)
NONHDLC SERPL-MCNC: 137 MG/DL
PLATELET # BLD AUTO: 191 10E3/UL (ref 150–450)
POTASSIUM SERPL-SCNC: 4.2 MMOL/L (ref 3.4–5.3)
PROT SERPL-MCNC: 7.4 G/DL (ref 6.4–8.3)
RBC # BLD AUTO: 4.44 10E6/UL (ref 3.8–5.9)
SODIUM SERPL-SCNC: 141 MMOL/L (ref 135–145)
TRIGL SERPL-MCNC: 152 MG/DL
WBC # BLD AUTO: 6.7 10E3/UL (ref 4–11)

## 2024-08-27 PROCEDURE — 36415 COLL VENOUS BLD VENIPUNCTURE: CPT | Performed by: FAMILY MEDICINE

## 2024-08-27 PROCEDURE — 85027 COMPLETE CBC AUTOMATED: CPT | Performed by: FAMILY MEDICINE

## 2024-08-27 PROCEDURE — 80061 LIPID PANEL: CPT | Performed by: FAMILY MEDICINE

## 2024-08-27 PROCEDURE — 99214 OFFICE O/P EST MOD 30 MIN: CPT | Mod: 25 | Performed by: FAMILY MEDICINE

## 2024-08-27 PROCEDURE — 17110 DESTRUCTION B9 LES UP TO 14: CPT | Performed by: FAMILY MEDICINE

## 2024-08-27 PROCEDURE — 80053 COMPREHEN METABOLIC PANEL: CPT | Performed by: FAMILY MEDICINE

## 2024-08-27 PROCEDURE — G0439 PPPS, SUBSEQ VISIT: HCPCS | Performed by: FAMILY MEDICINE

## 2024-08-27 RX ORDER — DONEPEZIL HYDROCHLORIDE 10 MG/1
10 TABLET, FILM COATED ORAL AT BEDTIME
Qty: 90 TABLET | Refills: 1 | Status: SHIPPED | OUTPATIENT
Start: 2024-08-27

## 2024-08-27 RX ORDER — VENLAFAXINE HYDROCHLORIDE 37.5 MG/1
CAPSULE, EXTENDED RELEASE ORAL
Qty: 90 CAPSULE | Refills: 2 | Status: SHIPPED | OUTPATIENT
Start: 2024-08-27

## 2024-08-27 SDOH — HEALTH STABILITY: PHYSICAL HEALTH: ON AVERAGE, HOW MANY MINUTES DO YOU ENGAGE IN EXERCISE AT THIS LEVEL?: 30 MIN

## 2024-08-27 SDOH — HEALTH STABILITY: PHYSICAL HEALTH: ON AVERAGE, HOW MANY DAYS PER WEEK DO YOU ENGAGE IN MODERATE TO STRENUOUS EXERCISE (LIKE A BRISK WALK)?: 2 DAYS

## 2024-08-27 ASSESSMENT — ANXIETY QUESTIONNAIRES
IF YOU CHECKED OFF ANY PROBLEMS ON THIS QUESTIONNAIRE, HOW DIFFICULT HAVE THESE PROBLEMS MADE IT FOR YOU TO DO YOUR WORK, TAKE CARE OF THINGS AT HOME, OR GET ALONG WITH OTHER PEOPLE: NOT DIFFICULT AT ALL
GAD7 TOTAL SCORE: 0
1. FEELING NERVOUS, ANXIOUS, OR ON EDGE: NOT AT ALL
7. FEELING AFRAID AS IF SOMETHING AWFUL MIGHT HAPPEN: NOT AT ALL
6. BECOMING EASILY ANNOYED OR IRRITABLE: NOT AT ALL
2. NOT BEING ABLE TO STOP OR CONTROL WORRYING: NOT AT ALL
3. WORRYING TOO MUCH ABOUT DIFFERENT THINGS: NOT AT ALL
5. BEING SO RESTLESS THAT IT IS HARD TO SIT STILL: NOT AT ALL
GAD7 TOTAL SCORE: 0

## 2024-08-27 ASSESSMENT — SOCIAL DETERMINANTS OF HEALTH (SDOH): HOW OFTEN DO YOU GET TOGETHER WITH FRIENDS OR RELATIVES?: ONCE A WEEK

## 2024-08-27 ASSESSMENT — PATIENT HEALTH QUESTIONNAIRE - PHQ9
5. POOR APPETITE OR OVEREATING: NOT AT ALL
SUM OF ALL RESPONSES TO PHQ QUESTIONS 1-9: 0

## 2024-08-27 ASSESSMENT — PAIN SCALES - GENERAL: PAINLEVEL: NO PAIN (0)

## 2024-08-27 NOTE — PROGRESS NOTES
"Preventive Care Visit  [facility] APURVA Vega MD, Family Medicine  Aug 27, 2024      Assessment & Plan     Hyperlipidemia LDL goal <130    - Lipid panel reflex to direct LDL Non-fasting; Future    Encounter for general adult medical examination with abnormal findings    - Lipid panel reflex to direct LDL Non-fasting; Future  - CBC with platelets; Future  - Comprehensive metabolic panel (BMP + Alb, Alk Phos, ALT, AST, Total. Bili, TP); Future    Impaired fasting glucose    - Lipid panel reflex to direct LDL Non-fasting; Future  - CBC with platelets; Future  - Comprehensive metabolic panel (BMP + Alb, Alk Phos, ALT, AST, Total. Bili, TP); Future    Cognitive impairment  Has been worsening, will increase the dose of Aricept to 10mg   - Lipid panel reflex to direct LDL Non-fasting; Future  - CBC with platelets; Future  - Comprehensive metabolic panel (BMP + Alb, Alk Phos, ALT, AST, Total. Bili, TP); Future  - donepezil (ARICEPT) 10 MG tablet; Take 1 tablet (10 mg) by mouth at bedtime.    Acute reaction to stress  Stable, keep monitoring   - venlafaxine (EFFEXOR XR) 37.5 MG 24 hr capsule; TAKE 1 CAPSULE BY MOUTH EVERY DAY    Verruca plantaris  On chin x 1, Treated with liquid nitrogen 3 times without complication   - DESTRUCT BENIGN LESION, UP TO 14    Seborrheic keratoses  On bilateral arm x 2, treated with liquid nitrogen 3 times without complication  - DESTRUCT BENIGN LESION, UP TO 14      Patient has been advised of split billing requirements and indicates understanding: Yes        BMI  Estimated body mass index is 32.26 kg/m  as calculated from the following:    Height as of this encounter: 1.702 m (5' 7.01\").    Weight as of this encounter: 93.4 kg (206 lb).   Weight management plan: Discussed healthy diet and exercise guidelines    Counseling  Appropriate preventive services were addressed with this patient via screening, questionnaire, or discussion as appropriate for fall prevention, " nutrition, physical activity, Tobacco-use cessation, social engagement, weight loss and cognition.  Checklist reviewing preventive services available has been given to the patient.  Reviewed patient's diet, addressing concerns and/or questions.   Francisca is at risk for lack of exercise and has been provided with information to increase physical activity for the benefit of Francisca's well-being.       FUTURE APPOINTMENTS:       - Follow-up visit in 6 months for cognitive dysfunction     Subjective   Francisca is a 73 year old, presenting for the following:  Wellness Visit        8/27/2024    10:17 AM   Additional Questions   Roomed by Nadiya GASPAR           Health Care Directive  Patient does not have a Health Care Directive or Living Will: Discussed advance care planning with patient; however, patient declined at this time.    HPI      Hyperlipidemia Follow-Up    Are you regularly taking any medication or supplement to lower your cholesterol?   Yes-    Are you having muscle aches or other side effects that you think could be caused by your cholesterol lowering medication?  No        8/27/2024   General Health   How would you rate your overall physical health? Good   Feel stress (tense, anxious, or unable to sleep) Not at all            8/27/2024   Nutrition   Diet: Regular (no restrictions)            8/27/2024   Exercise   Days per week of moderate/strenous exercise 2 days   Average minutes spent exercising at this level 30 min      (!) EXERCISE CONCERN      8/27/2024   Social Factors   Frequency of gathering with friends or relatives Once a week   Worry food won't last until get money to buy more No    No   Food not last or not have enough money for food? No    No   Do you have housing? (Housing is defined as stable permanent housing and does not include staying ouside in a car, in a tent, in an abandoned building, in an overnight shelter, or couch-surfing.) Yes    Yes   Are you worried about losing your housing? No    No    Lack of transportation? No    No   Unable to get utilities (heat,electricity)? No    No       Multiple values from one day are sorted in reverse-chronological order         8/27/2024   Fall Risk   Fallen 2 or more times in the past year? No    No   Trouble with walking or balance? No    No       Multiple values from one day are sorted in reverse-chronological order          8/27/2024   Activities of Daily Living- Home Safety   Needs help with the following daily activites None of the above   Safety concerns in the home None of the above            8/27/2024   Dental   Dentist two times every year? Yes            8/27/2024   Hearing Screening   Hearing concerns? None of the above            8/27/2024   Driving Risk Screening   Patient/family members have concerns about driving No            8/27/2024   General Alertness/Fatigue Screening   Have you been more tired than usual lately? No            8/27/2024   Urinary Incontinence Screening   Bothered by leaking urine in past 6 months No            8/27/2024   TB Screening   Were you born outside of the [place]? No            Today's PHQ-2 Score:       8/27/2024    10:11 AM   PHQ-2 ( 1999 [employer])   Q1: Little interest or pleasure in doing things 0   Q2: Feeling down, depressed or hopeless 0   PHQ-2 Score 0   Q1: Little interest or pleasure in doing things Not at all   Q2: Feeling down, depressed or hopeless Not at all   PHQ-2 Score 0           8/27/2024   Substance Use   Alcohol more than 3/day or more than 7/wk No   Do you have a current opioid prescription? No   How severe/bad is pain from 1 to 10? 0/10 (No Pain)   Do you use any other substances recreationally? No        Social History     Tobacco Use    Smoking status: Never    Smokeless tobacco: Never   Vaping Use    Vaping status: Never Used   Substance Use Topics    Alcohol use: Yes     Comment: two or more on occassion    Drug use: No           7/3/2024   LAST FHS-7 RESULTS   1st degree relative breast or ovarian  cancer No   Any relative bilateral breast cancer No   Any male have breast cancer No   Any ONE woman have BOTH breast AND ovarian cancer No   Any woman with breast cancer before 50yrs No   2 or more relatives with breast AND/OR ovarian cancer No   2 or more relatives with breast AND/OR bowel cancer Yes           Mammogram Screening - After age 74- determine frequency with patient based on health status, life expectancy and patient goals    ASCVD Risk   The 10-year ASCVD risk score (Luis JOYCE, et al., 2019) is: 15.2%    Values used to calculate the score:      Age: 73 years      Sex: Female      Is Non- : No      Diabetic: No      Tobacco smoker: No      Systolic Blood Pressure: 124 mmHg      Is BP treated: No      HDL Cholesterol: 79 mg/dL      Total Cholesterol: 191 mg/dL              Reviewed and updated as needed this visit by Provider                    Past Medical History:   Diagnosis Date    Arthritis of right knee 8/1/2018    Depressive disorder     Family history of colon cancer     Mixed hyperlipidemia      Past Surgical History:   Procedure Laterality Date    cataracts      COLONOSCOPY N/A 12/4/2014    Procedure: COLONOSCOPY;  Surgeon: Todd Borrego MD;  Location:  GI    EYE SURGERY  2014    Cataract surgery on both eyes    GYN SURGERY  ?    d/c    SURGICAL HISTORY OF -       s/p D&C 25 years ago    SURGICAL HISTORY OF -   8/99, 2004    Colonoscopy     Labs reviewed in EPIC  BP Readings from Last 3 Encounters:   08/27/24 124/70   04/15/24 133/79   01/08/24 128/72    Wt Readings from Last 3 Encounters:   08/27/24 93.4 kg (206 lb)   04/15/24 93.8 kg (206 lb 12.8 oz)   01/08/24 93.4 kg (206 lb)                  Patient Active Problem List   Diagnosis    Acute reaction to stress    HYPERLIPIDEMIA LDL GOAL <130    Impaired fasting glucose    Knee pain    Family history of colon cancer    Benign neoplasm of skin of trunk, except scrotum    Microscopic colitis,  unspecified microscopic colitis type    Arthritis of right knee    Onychomycosis    Cognitive impairment     Past Surgical History:   Procedure Laterality Date    cataracts      COLONOSCOPY N/A 2014    Procedure: COLONOSCOPY;  Surgeon: Todd Borrego MD;  Location:  GI    EYE SURGERY      Cataract surgery on both eyes    GYN SURGERY  ?    d/c    SURGICAL HISTORY OF -       s/p D&C 25 years ago    SURGICAL HISTORY OF -   ,     Colonoscopy       Social History     Tobacco Use    Smoking status: Never    Smokeless tobacco: Never   Substance Use Topics    Alcohol use: Yes     Comment: two or more on occassion     Family History   Problem Relation Age of Onset    Cancer - colorectal Mother         82 yo     Colon Cancer Mother             Cancer Father         Brain tumor, age 46         Current Outpatient Medications   Medication Sig Dispense Refill    cholecalciferol (VITAMIN-D) 1000 UNIT capsule Take 1 capsule by mouth daily      donepezil (ARICEPT) 10 MG tablet Take 1 tablet (10 mg) by mouth at bedtime. 90 tablet 1    simvastatin (ZOCOR) 40 MG tablet TAKE 1 TABLET BY MOUTH EVERY DAY 90 tablet 3    venlafaxine (EFFEXOR XR) 37.5 MG 24 hr capsule TAKE 1 CAPSULE BY MOUTH EVERY DAY 90 capsule 2     Allergies   Allergen Reactions    Vicodin [Hydrocodone-Acetaminophen]     Hydrocodone-Acetaminophen Other (See Comments)     Recent Labs   Lab Test 04/15/24  1121 23  1509 22  1005 22  1005 21  1010 20  0957   * 93  --   --  98 79   HDL  --  79  --   --  77 55   TRIG  --  97  --   --  100 78   ALT 18 16  --  16 22 26   CR 0.75 0.65   < > 0.63 0.74 0.59   GFRESTIMATED 83 >90   < > >90 81 >90   GFRESTBLACK  --   --   --   --  >90 >90   POTASSIUM 4.8 4.4   < > 4.4 4.1 3.9   TSH  --   --   --  2.14  --   --     < > = values in this interval not displayed.      Current providers sharing in care for this patient include:  Patient Care Team:  Alen Vega MD as  "PCP - General (Family Medicine)  Alen Vega MD as Assigned PCP  Mayela Marr, PhD as Assigned Behavioral Health Provider  Mayela Marr, PhD as Psychologist (Neuropsychology)    The following health maintenance items are reviewed in Epic and correct as of today:  Health Maintenance   Topic Date Due    COVID-19 Vaccine (6 - 2023-24 season) 07/25/2024    LIPID  08/09/2024    INFLUENZA VACCINE (1) 09/01/2024    COLORECTAL CANCER SCREENING  10/16/2024    ANNUAL REVIEW OF HM ORDERS  01/08/2025    MEDICARE ANNUAL WELLNESS VISIT  08/27/2025    FALL RISK ASSESSMENT  08/27/2025    GLUCOSE  04/15/2027    DTAP/TDAP/TD IMMUNIZATION (3 - Td or Tdap) 01/07/2029    ADVANCE CARE PLANNING  08/27/2029    DEXA  12/05/2031    HEPATITIS C SCREENING  Completed    PHQ-2 (once per calendar year)  Completed    Pneumococcal Vaccine: 65+ Years  Completed    ZOSTER IMMUNIZATION  Completed    RSV VACCINE (Pregnancy & 60+)  Completed    HPV IMMUNIZATION  Aged Out    MENINGITIS IMMUNIZATION  Aged Out    RSV MONOCLONAL ANTIBODY  Aged Out    MAMMO SCREENING  Discontinued         Review of Systems  Constitutional, HEENT, cardiovascular, pulmonary, GI, , musculoskeletal, neuro, skin, endocrine and psych systems are negative, except as otherwise noted.     Objective    Exam  /70 (BP Location: Right arm, Patient Position: Sitting, Cuff Size: Adult Regular)   Pulse 84   Temp 98.3  F (36.8  C) (Tympanic)   Resp 15   Ht 1.702 m (5' 7.01\")   Wt 93.4 kg (206 lb)   SpO2 97%   BMI 32.26 kg/m     Estimated body mass index is 32.26 kg/m  as calculated from the following:    Height as of this encounter: 1.702 m (5' 7.01\").    Weight as of this encounter: 93.4 kg (206 lb).    Physical Exam  GENERAL: alert and no distress  EYES: Eyes grossly normal to inspection, PERRL and conjunctivae and sclerae normal  HENT: ear canals and TM's normal, nose and mouth without ulcers or lesions  NECK: no adenopathy, no asymmetry, masses, or " scars  RESP: lungs clear to auscultation - no rales, rhonchi or wheezes  CV: regular rate and rhythm, normal S1 S2, no S3 or S4, no murmur, click or rub, no peripheral edema  ABDOMEN: soft, nontender, no hepatosplenomegaly, no masses and bowel sounds normal  MS: no gross musculoskeletal defects noted, no edema  SKIN: no suspicious lesions or rashes  NEURO: Normal strength and tone, mentation intact and speech normal  BACK: no CVA tenderness, no paralumbar tenderness  PSYCH: mentation appears normal, affect normal/bright  LYMPH: no cervical, supraclavicular, axillary, or inguinal adenopathy         8/27/2024   Mini Cog   Clock Draw Score 2 Normal   3 Item Recall 0 objects recalled   Mini Cog Total Score 2                 Signed Electronically by: Alen Vega MD

## 2024-08-27 NOTE — PATIENT INSTRUCTIONS
Patient Education   Preventive Care Advice   This is general advice given by our system to help you stay healthy. However, your care team may have specific advice just for you. Please talk to your care team about your preventive care needs.  Nutrition  Eat 5 or more servings of fruits and vegetables each day.  Try wheat bread, brown rice and whole grain pasta (instead of white bread, rice, and pasta).  Get enough calcium and vitamin D. Check the label on foods and aim for 100% of the RDA (recommended daily allowance).  Lifestyle  Exercise at least 150 minutes each week  (30 minutes a day, 5 days a week).  Do muscle strengthening activities 2 days a week. These help control your weight and prevent disease.  No smoking.  Wear sunscreen to prevent skin cancer.  Have a dental exam and cleaning every 6 months.  Yearly exams  See your health care team every year to talk about:  Any changes in your health.  Any medicines your care team has prescribed.  Preventive care, family planning, and ways to prevent chronic diseases.  Shots (vaccines)   HPV shots (up to age 26), if you've never had them before.  Hepatitis B shots (up to age 59), if you've never had them before.  COVID-19 shot: Get this shot when it's due.  Flu shot: Get a flu shot every year.  Tetanus shot: Get a tetanus shot every 10 years.  Pneumococcal, hepatitis A, and RSV shots: Ask your care team if you need these based on your risk.  Shingles shot (for age 50 and up)  General health tests  Diabetes screening:  Starting at age 35, Get screened for diabetes at least every 3 years.  If you are younger than age 35, ask your care team if you should be screened for diabetes.  Cholesterol test: At age 39, start having a cholesterol test every 5 years, or more often if advised.  Bone density scan (DEXA): At age 50, ask your care team if you should have this scan for osteoporosis (brittle bones).  Hepatitis C: Get tested at least once in your life.  STIs (sexually  transmitted infections)  Before age 24: Ask your care team if you should be screened for STIs.  After age 24: Get screened for STIs if you're at risk. You are at risk for STIs (including HIV) if:  You are sexually active with more than one person.  You don't use condoms every time.  You or a partner was diagnosed with a sexually transmitted infection.  If you are at risk for HIV, ask about PrEP medicine to prevent HIV.  Get tested for HIV at least once in your life, whether you are at risk for HIV or not.  Cancer screening tests  Cervical cancer screening: If you have a cervix, begin getting regular cervical cancer screening tests starting at age 21.  Breast cancer scan (mammogram): If you've ever had breasts, begin having regular mammograms starting at age 40. This is a scan to check for breast cancer.  Colon cancer screening: It is important to start screening for colon cancer at age 45.  Have a colonoscopy test every 10 years (or more often if you're at risk) Or, ask your provider about stool tests like a FIT test every year or Cologuard test every 3 years.  To learn more about your testing options, visit:   .  For help making a decision, visit:   https://bit.ly/gi56545.  Prostate cancer screening test: If you have a prostate, ask your care team if a prostate cancer screening test (PSA) at age 55 is right for you.  Lung cancer screening: If you are a current or former smoker ages 50 to 80, ask your care team if ongoing lung cancer screenings are right for you.  For informational purposes only. Not to replace the advice of your health care provider. Copyright   2023 [employer] All rights reserved. Clinically reviewed by the [facility] Transitions Program. [employer]. 644981 - REV 01/24.

## 2024-09-16 ENCOUNTER — PATIENT OUTREACH (OUTPATIENT)
Dept: CARE COORDINATION | Facility: CLINIC | Age: 74
End: 2024-09-16
Payer: COMMERCIAL

## 2024-10-03 ENCOUNTER — TELEPHONE (OUTPATIENT)
Dept: FAMILY MEDICINE | Facility: CLINIC | Age: 74
End: 2024-10-03
Payer: COMMERCIAL

## 2024-10-03 DIAGNOSIS — R41.89 COGNITIVE IMPAIRMENT: Primary | ICD-10-CM

## 2024-10-03 NOTE — TELEPHONE ENCOUNTER
Consent to communicate on file for patient's son Wilmar. Son is trying to schedule patient's annual neuropsych visit for 2025 and was informed that he needs a new referral for 2025 before he can schedule.    Son requests call back on referral has been ordered.  Son lives in [place] and states that they are scheduling 8-9 months out. Liliana Iyer RN

## 2024-10-03 NOTE — TELEPHONE ENCOUNTER
Spoke to WILLIAM Urban on file. Relayed that request has been fulfilled.     Tahira Arguelles RN

## 2024-11-15 ENCOUNTER — TELEPHONE (OUTPATIENT)
Dept: FAMILY MEDICINE | Facility: CLINIC | Age: 74
End: 2024-11-15
Payer: COMMERCIAL

## 2024-11-15 NOTE — TELEPHONE ENCOUNTER
Forms/Letter Request    Type of form/letter: DME (wrist orthosis)    Type of DME requested: TBD    Do we have the form/letter: Yes, red folder, Dr. Vega's inbox.    Who is the form from? [facility]    Where did/will the form come from? form was faxed in    When is form/letter needed by: As able    How would you like the form/letter returned:   Fax to  [phone]    Paige Perez on 11/15/2024 at 12:10 PM

## 2025-04-08 DIAGNOSIS — R41.89 COGNITIVE IMPAIRMENT: ICD-10-CM

## 2025-04-08 RX ORDER — DONEPEZIL HYDROCHLORIDE 10 MG/1
10 TABLET, FILM COATED ORAL AT BEDTIME
Qty: 90 TABLET | Refills: 1 | Status: SHIPPED | OUTPATIENT
Start: 2025-04-08

## 2025-04-10 ENCOUNTER — OFFICE VISIT (OUTPATIENT)
Dept: FAMILY MEDICINE | Facility: CLINIC | Age: 75
End: 2025-04-10
Payer: COMMERCIAL

## 2025-04-10 VITALS
TEMPERATURE: 97.7 F | SYSTOLIC BLOOD PRESSURE: 122 MMHG | OXYGEN SATURATION: 91 % | WEIGHT: 198.6 LBS | HEIGHT: 67 IN | DIASTOLIC BLOOD PRESSURE: 82 MMHG | RESPIRATION RATE: 16 BRPM | HEART RATE: 87 BPM | BODY MASS INDEX: 31.17 KG/M2

## 2025-04-10 DIAGNOSIS — F43.0 ACUTE REACTION TO STRESS: Primary | ICD-10-CM

## 2025-04-10 DIAGNOSIS — R41.89 COGNITIVE IMPAIRMENT: ICD-10-CM

## 2025-04-10 DIAGNOSIS — E78.5 HYPERLIPIDEMIA LDL GOAL <130: ICD-10-CM

## 2025-04-10 DIAGNOSIS — R73.01 IMPAIRED FASTING GLUCOSE: ICD-10-CM

## 2025-04-10 RX ORDER — MEMANTINE HYDROCHLORIDE 5 MG/1
5 TABLET ORAL 2 TIMES DAILY
Status: CANCELLED | OUTPATIENT
Start: 2025-04-10

## 2025-04-10 ASSESSMENT — PAIN SCALES - GENERAL: PAINLEVEL_OUTOF10: NO PAIN (0)

## 2025-04-10 NOTE — PROGRESS NOTES
"  Assessment & Plan     Acute reaction to stress  Stable, keep monitoring with current regimen    Cognitive impairment  Stable with current regimen,  will keep monitoring   His son(caregiver) needs FMLA for his mother, will support     Impaired fasting glucose  Stable, keep monitoring     HYPERLIPIDEMIA LDL GOAL <130  Stable, keep monitoring           BMI  Estimated body mass index is 31.17 kg/m  as calculated from the following:    Height as of this encounter: 1.7 m (5' 6.93\").    Weight as of this encounter: 90.1 kg (198 lb 9.6 oz).   Weight management plan: Discussed healthy diet and exercise guidelines      FUTURE APPOINTMENTS:       - Follow-up visit at [facility]    Subjective   Francisca is a 74 year old, presenting for the following health issues:  Medication Follow-up and Forms (FMLA, leave of absence for her son, taking care of her needs. )        4/10/2025     1:26 PM   Additional Questions   Roomed by Maria R ARCHER   Accompanied by Alexis- Dell         4/10/2025   Forms   Any forms needing to be completed Yes     History of Present Illness       Reason for visit:  Son's visit and check up    Francisca eats 0-1 servings of fruits and vegetables daily.Francisca consumes 0 sweetened beverage(s) daily.Francisca exercises with enough effort to increase Francisca's heart rate 9 or less minutes per day.  Francisca exercises with enough effort to increase Francisca's heart rate 3 or less days per week. Francisca is missing 6 dose(s) of medications per week.  Francisca is not taking prescribed medications regularly due to remembering to take.          Hyperlipidemia Follow-Up    Are you regularly taking any medication or supplement to lower your cholesterol?   Yes- statin  Are you having muscle aches or other side effects that you think could be caused by your cholesterol lowering medication?  No          Medication Followup of Donepezil  Taking Medication as prescribed: yes  Side Effects:  None  Medication Helping Symptoms:  yes        Review of " "Systems  Constitutional, HEENT, cardiovascular, pulmonary, GI, , musculoskeletal, neuro, skin, endocrine and psych systems are negative, except as otherwise noted.      Objective    /82   Pulse 87   Temp 97.7  F (36.5  C) (Temporal)   Resp 16   Ht 1.7 m (5' 6.93\")   Wt 90.1 kg (198 lb 9.6 oz)   SpO2 (!) 91%   BMI 31.17 kg/m    Body mass index is 31.17 kg/m .  Physical Exam   GENERAL: alert and no distress  EYES: Eyes grossly normal to inspection, PERRL and conjunctivae and sclerae normal  HENT: ear canals and TM's normal, nose and mouth without ulcers or lesions  NECK: no adenopathy, no asymmetry, masses, or scars  RESP: lungs clear to auscultation - no rales, rhonchi or wheezes  CV: regular rate and rhythm, normal S1 S2, no S3 or S4, no murmur, click or rub, no peripheral edema  ABDOMEN: soft, nontender, no hepatosplenomegaly, no masses and bowel sounds normal  MS: no gross musculoskeletal defects noted, no edema  SKIN: no suspicious lesions or rashes  NEURO: Normal strength and tone, mentation intact and speech normal  BACK: no CVA tenderness, no paralumbar tenderness    The longitudinal plan of care for the diagnosis(es)/condition(s) as documented were addressed during this visit. Due to the added complexity in care, I will continue to support Francisca in the subsequent management and with ongoing continuity of care.          Signed Electronically by: Alen Vega MD    "

## 2025-06-09 ENCOUNTER — TELEPHONE (OUTPATIENT)
Dept: NEUROPSYCHOLOGY | Facility: CLINIC | Age: 75
End: 2025-06-09
Payer: COMMERCIAL

## 2025-06-09 NOTE — TELEPHONE ENCOUNTER
Left Voicemail (1st Attempt) and Sent [employer] (1st Attempt) for the patient to call back and schedule the following:    Appointment type: Interview Only virtual  Provider: Dr. Marr  Return date: see below  Specialty phone number: [phone]  Additional appointment(s) needed: NA  Additonal Notes:     Please scheduled for 7/15/25 at 10:00 am or 7/22/25 at 10:00 am.

## 2025-06-11 ENCOUNTER — TELEPHONE (OUTPATIENT)
Dept: NEUROPSYCHOLOGY | Facility: CLINIC | Age: 75
End: 2025-06-11
Payer: COMMERCIAL

## 2025-06-11 NOTE — TELEPHONE ENCOUNTER
Patient confirmed scheduled appointment:  Date: 7/22/25  Time: 10:00  Visit type: Interview Only  Provider: Dr. Marr  Location: Virtual  Testing/imaging: NA  Additional notes: Per IB message from the provider

## 2025-07-22 ENCOUNTER — VIRTUAL VISIT (OUTPATIENT)
Dept: NEUROPSYCHOLOGY | Facility: CLINIC | Age: 75
End: 2025-07-22
Payer: COMMERCIAL

## 2025-07-22 DIAGNOSIS — G31.84 AMNESTIC MCI (MILD COGNITIVE IMPAIRMENT WITH MEMORY LOSS): Primary | ICD-10-CM

## 2025-07-22 PROCEDURE — 99207 PR NO CHARGE LOS: CPT | Mod: 95

## 2025-07-22 NOTE — PROGRESS NOTES
Ms. Ramírez completed the interview portion of her neuropsychological evaluation today via telehealth along with her son, Wilmar. Full report to follow pending completion of in-person cognitive testing on 7/23/2025.

## 2025-07-23 ENCOUNTER — OFFICE VISIT (OUTPATIENT)
Dept: NEUROPSYCHOLOGY | Facility: CLINIC | Age: 75
End: 2025-07-23
Attending: FAMILY MEDICINE
Payer: COMMERCIAL

## 2025-07-23 DIAGNOSIS — F02.80 MAJOR NEUROCOGNITIVE DISORDER DUE TO ALZHEIMER'S DISEASE (H): Primary | ICD-10-CM

## 2025-07-23 DIAGNOSIS — G30.9 MAJOR NEUROCOGNITIVE DISORDER DUE TO ALZHEIMER'S DISEASE (H): Primary | ICD-10-CM

## 2025-07-23 PROCEDURE — 96132 NRPSYC TST EVAL PHYS/QHP 1ST: CPT

## 2025-07-23 PROCEDURE — 96139 PSYCL/NRPSYC TST TECH EA: CPT

## 2025-07-23 PROCEDURE — 90791 PSYCH DIAGNOSTIC EVALUATION: CPT

## 2025-07-23 PROCEDURE — 96133 NRPSYC TST EVAL PHYS/QHP EA: CPT

## 2025-07-23 PROCEDURE — 96138 PSYCL/NRPSYC TECH 1ST: CPT

## 2025-07-23 NOTE — LETTER
2025      RE: Francisca Ramírez  [address]  [place] [address]       The patient was seen for neuropsychological evaluation at the request of Alen Vega MD for the purposes of diagnostic clarification and treatment planning. 222 minutes of test administration and scoring were provided by this writer. Please see Dr. Mayela Marr's report for a full interpretation of the findings.    [facility]  Psychometrist          NEUROPSYCHOLOGICAL EVALUATION  **CONFIDENTIAL**    **This is a medical document intended for communication with the referring provider. It is written in medical language and may contain abbreviations or verbiage that are unfamiliar. It may appear blunt or direct. This report and the results within are not intended to be interpreted in isolation without consultation with your medical provider. **    Name: Francisca Ramírez Education: 12 years    (age): 1950 (74 years) HUERTA:  2025 & 2025   Referral: Alen Vega MD  Department of Family Medicine Handedness:  MRN (Epic): Right  [id number]     IDENTIFYING INFORMATION AND REASON FOR REFERRAL   Ms. Ramírez is a 74-year-old, right-handed, White female with a history of depression.  She completed neuropsychological testing on 5/15/2023 and 6/15/2024 which were suggestive of amnestic mild cognitive impairment. This evaluation was requested to provide a comprehensive re-assessment of her current neuropsychological status to inform treatment planning.     IMPRESSION  See below for relevant background information and detailed test results. See separate abstract for supporting documentation including a list of neuropsychological measures and test scores.    Ms. Ramírez's neuropsychological profile again revealed marked difficulty with learning and memory characterized by amnestic rapid forgetting of information as well as notable difficulty verbal fluency, spatial perception, and aspects of executive functioning (i.e., mental  flexibility, cognitive inhibition, practical reasoning/safety awareness). Declines were noted on tests of letter cue verbal fluency, working memory, and knowledge of health and safety compared to 2024. Performances were within normal limits and stable on tests of immediate auditory attention, basic visuoconstruction, confrontation naming, verbal and visual reasoning, and processing speed. Assessment of current psychological and emotional status revealed the absence of clinically significant depressive mood symptoms or excessive anxiety.    Overall, Ms. Ramírez continues to exhibit multidomain cognitive impairment with some declines compared to last year. These findings, in the context of her son's report that she is now needing assistance with activities of daily living, is suggestive of a diagnosis of major neurocognitive disorder (i.e., dementia). The pattern of memory impairment and progressive decline over time is strongly suggestive of a neurodegenerative process and Alzheimer's disease is the most likely etiology.     RECOMMENDATIONS  Results from the present evaluation revealed significant memory impairment. It is recommended that trusted family continue to provide regular oversight and monitoring for complex activities of daily living to ensure her safety and wellbeing are maintained.    Ms. Ramírez should be accompanied to all medical appointments to ensure the accurate exchange and recollection of information.  Ms. Ramírez is encouraged to continue to live a healthy lifestyle that promotes brain health, including getting appropriate exercise (as advised by her healthcare providers), getting regular sleep, and eating healthy.    We recommend that Ms. Ramírez continue to maintain a socially active lifestyle. She is likely to benefit from continuing to engage in social activities that she enjoys as these activities may offer cognitive stimulation, help to prevent feelings of depression, and provide  emotional benefits that will maximize quality of life.   The following resources may be helpful to Ms. Ramírez and her family:  -Family members needing additional information regarding dementia can contact the Alzheimer's Disease and Related Disorders Association at [phone], or visit the website online at http://www.alz.org. This organization provides educational and referral resources for various types of dementia and provides information that may be beneficial now or in the future.    -The [facility] Chapter of the Alzheimer's Association can provide further information about family support groups in the area, home health services, and respite services. Information is also available at their website: www.alz.org/[place]nd  -The Family Caregiver [place] website also has helpful information:  http://www.caregiver.org/caregiver/jsp/home.jsp.   -There are several resource guides that have been published for family members and caregivers of individuals with dementia.  -Creating Moments of [place] by Negrita Alexander provides a number of simple, helpful hints for caregivers.  -[facility] on Alzheimer's Disease provides practical information for families.  -A Dignified Life: The Best Friends Approach to Alzheimer's Care by GIOVANNI GaloS.[email or web address]. and LEWIS Palmer, M.P.H.  -The 36-Hour Day: A Family Guide to Caring for People Who Have Alzheimer's Disease and Other Dementias (7th Edition) by Yolanda Mcbride M.A. and Keaton Leigh M.D.  No neuropsychological follow-up is currently indicated.    Thank you for the opportunity to participate in Ms. Ramírez's care.  These findings and recommendations were reviewed with the patient and her son, Wilmar, in a separate feedback session on 7/29/2025 and all their questions were answered. If you have any questions regarding this evaluation, please do not hesitate to contact me.       Mayela Marr, Ph.D., LP, ABPP  Board Certified Clinical Neuropsychologist    RELEVANT  BACKGROUND INFORMATION AND SUPPORTING DOCUMENTATION  Gathered from a clinical interview with the patient and her son, Wilmar, and reviews of electronic medical record. Clinical interview was completed via telehealth on 7/22/2025.     History of Presenting Problem(s)  Ms. Ramírez presented with a history of depression. She completed neuropsychological testing on 5/15/2023 and 6/15/2024 and in the context of reported intact activities of daily living, a diagnosis of amnestic mild cognitive impairment was provided at both of those evaluations. At the time of this assessment Ms. Ramírez reported memory changes over the past year characterized by more trouble recalling details of conversations and appointments. She noted some days are better than others. Her son, Wilmar, noted progression of cognitive decline in the past year. He stated she continues to repeat questions but she needs more assistance with activities of daily living at this time. She continues to live with her son, Dell, and her son, Wilmar, is around in the summer. Her son continues to assist with finances, but now her sons are more involved with helping her keep track of upcoming medical appointments and events. Her son sets up her pillbox and they need to remind her to take her medications at the correct time. She does minimal meal preparation, making only simple meals, though Wilmar stated he would not feel comfortable with her using the strove or oven without observation. She stopped driving last month following an instance of getting lost. Specifically, [facility] [place] [facility] away from her home and she did not return for several hours prompting her family to contact the police to look for her. 11 hours later she managed to call her cell phone, that she had left at home, and her son was able to find her in [place], but she was unable to discuss what happened in those hours she was gone. She is independent with her basic self-care  and pet care though her son stated that he is uncomfortable with her being alone during the day.     Physical symptoms  Symptom Present Absent Notes   Balance difficulties [] [x]    Falls [] [x]    Gait changes [] [x] Son stated she walks slower   Tremor [] [x]    Numbness/Tingling [] [x]    Weakness [] [x]    Vision changes [] [x]    Hearing loss [] [x]    Hyposmia  [] [x]    Incontinence [] [x]    Other [x] [] GI distress     Psychiatric History  Ms. Ramírez reported current mood is  I guess fine. I'm not happy, I'm not sad.   History is notable for depression that has been well managed with medication (venlafaxine) for many years; she denied any recent symptoms of depression.  She otherwise denied history of hypomanic or manic mood symptoms, excessive anxiety or uncontrollable worry, compulsive behaviors, alteration of sensory perceptual processes or disordered thought.  Suicidality/ Self-harm: She denied any suicidal ideation, plan, or intent  Alteration of sensory perceptual processes or disordered thought: Denied  Behavioral/personality changes: Denied     Substance Use History  Alcohol: None  Nicotine: None  Cannabis: None  Problematic Substance Use: Denied    Health Behaviors   Sleep: ~8+ hours of cumulative sleep nightly. She denied sleep disturbance, snoring, gasping arousals, or parasomnic behaviors. She reported feeling rested in the morning though her son stated she seems to sleep longer lately, and he sometimes needs to get her out of bed. She denied daytime fatigue but noted she may nap in the afternoon.   Exercise: She may walk with her dog to get the mail (10-15min walk).   Pain: Denied; reported some GI distress that is worse recently.     Neurodiagnostic Findings   Prior neuropsychological evaluation (6/15/2024) Ms. Ramírez underwent neuropsychological testing on 5/15/2023 which revealed significant difficulty on tests of verbal and visual memory characterized by amnestic rapid forgetting of  information and difficulties on select tests of executive functioning. Her current neuropsychological performance revealed a generally stable cognitive profile with a few improved and declined scores. Specifically, while recall of a story improved to the below average range, her learning of a word list declined from low average to exceptionally low range. Consistent with her prior evaluation, she was unable to recall any words from the list or any figural details from a visual memory task following a delay and recognition scores across all memory tests were below expectation. Performances improved on tests of speeded word reading and knowledge of health and safety; however, scores declined on tests of mental flexibility, semantic verbal fluency, and spatial perception. Overall, scores fell below expectation on tests of learning and memory, semantic verbal fluency, and aspects of executive functioning including mental flexibly and planning/organization. Scores were within expectation on tests of attention/working memory, processing speed, naming, letter-cue verbal fluency, basic visuoconstruction, cognitive inhibition, and abstract reasoning. Assessment of psychological status revealed the absence of clinically significant depression or excessive anxiety symptoms. While findings suggest Ms. Ramírez likely experiences cognitive inefficiencies in her day-to-day life, she and her son again reported limited functional difficulties at present. Importantly, she has several cognitive strengths that will be beneficial to her in order to compensate for any cognitive weaknesses. Taken together, observed test performance and reported levels of day-to-day functioning continue to suggest a diagnosis of mild neurocognitive disorder (i.e., amnestic mild cognitive impairment). Amnestic MCI is highly associated with future development of Alzheimer-type dementia and ongoing monitoring of her cognitive and functional status over time  is recommended.     Medical History (per EMR)  Patient Active Problem List   Diagnosis     Acute reaction to stress     HYPERLIPIDEMIA LDL GOAL <130     Impaired fasting glucose     Knee pain     Family history of colon cancer     Benign neoplasm of skin of trunk, except scrotum     Microscopic colitis, unspecified microscopic colitis type     Arthritis of right knee     Onychomycosis     Cognitive impairment   Denied history of stroke, seizure, or head injury with LOC or post-concussive symptoms    Current Medications (per EMR)  Ms. Ramírez has a current medication list which includes the following prescription(s): cholecalciferol, donepezil, simvastatin, and venlafaxine.      Educational, & Occupational History  Childhood behavioral / emotional / academic problems: Reported math was difficult, but she did not receive any assistance or accommodations  Native Language: English  Education: She described self as an average student. 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     Psychosocial History  Born in [place] and raised in [place], [place]  Marital:   Children: 2 sons  Housing: Lives with her son  Psychosocial support: Strong social support network of family (sons, siblings) and neighbors    Family History  No known history of dementia or neurological disorders    RESULTS  See separate abstract for list of neuropsychological measures and test scores. Descriptive ranges are based on American Academy of Clinical Neuropsychology (2020) consensus guidelines, or test manuals where appropriate. All standardized scores are adjusted for age and additional adjustments for demographic factors such as education were performed where applicable. Comparisons were made to her neuropsychological evaluation in 6/2024.    PRE-MORBID  ABILITY: Premorbid abilities were estimated within the average range based on single word reading ability and demographic factors including sex, ethnicity, education, occupation, and geographic region completed during her prior evaluation.   GENERAL COGNITIVE STATUS: Orientation was within expectation for general personal information, place, and situation. She was not fully oriented to time (2 days off on the date, 1 month off, and she incorrectly estimated the time by ~7 hours). Within the practical domain, performance was below expectation on a measure of conceptual understanding of issues pertaining to health and safety. Her score was consistent with individuals functioning at a low level of independence in the community, which was consistent with prior testing.   ATTENTION/EXECUTIVE FUNCTIONS: Immediate auditory attention and working memory performances were low average and very slightly declined. Verbal abstract reasoning performance was average and stable. Visual reasoning through pattern identification was average and stable. Performance on a test of set-shifting/cognitive flexibility was discontinued due to inability to complete the task consistent with prior testing and notable for several (10) errors. Response inhibition performance was below average and stable. Spontaneous clock drawing was abnormal and notable for numbers in counter-clockwise order and incorrect (stimulus bound) placement of clock hands.   PROCESSING SPEED: Psychomotor processing speed performances were average to high average and stable.  LANGUAGE: Confrontation naming performance was average and stable. Letter-cue verbal fluency performance was below average and declined. Semantic verbal fluency performance was exceptionally low and stable.  VISUOSPATIAL PROCESSING: Performance on a spatial perception task requiring judgement of angled lines was exceptionally low and stable. Copy of increasingly intricate designs was average and  stable. Copy of a clock was within normal limits.  LEARNING AND MEMORY: Initial encoding of a word list over multiple learning trials was exceptionally low and stable. Delayed recall of the list was exceptionally low with no words recalled, consistent with prior testing. Recognition of the word list was exceptionally low with several false positive responses, which is also consistent with prior testing. Initial encoding of contextualized verbal information in the form of short stories was below average and stable. Delayed recall was exceptionally low with no details recalled, consistent with prior testing. Recognition of story details was below average and stable. Encoding of visual information was below average and slightly declined. Delayed recall was exceptionally low with 1 figural detail recalled and stable compared with prior testing. Recognition among distractors was below average with only 1/7 correct responses.     PSYCHOLOGICAL AND BEHAVIORAL: She endorsed minimal symptoms of depression and anxiety on self-report questionnaires.   PERFORMANCE VALIDITY: Performance on neuropsychological tests is dependent upon a number of factors, including sufficient engagement and motivation, to reliably establish an examinee's level of cognitive functioning.  Based upon observations made throughout the evaluation, the patient did not appear to deliberately perform in a suboptimal manner and demonstrated good frustration tolerance on cognitively challenging tasks. Score on an embedded index of performance validity was slightly below established cutoffs but this is not uncommon in individuals with significant cognitive impairment. Overall, test results are believed to accurately represent the patient's current neurocognitive status.    BEHAVIORAL OBSERVATIONS  Presented on time and was accompanied by her son  Alert, attentive and focused while undergoing testing  Appearance: Well-groomed, casually dressed  Gait/Posture: No  abnormalities noted  Sensorimotor: No abnormalities noted  Behavior: Cooperative, pleasant, no behavioral abnormalities noted  Speech: Self-corrected semantic paraphasic errors were noted on a confrontation naming test (e.g., harmonica for harp, raccoon for [place]). Her speech was fluent and articulate with normal rate, prosody, and volume; no conversational word finding difficulty   Thought process: Perseverative/repetitive at times  Thought content: Logical, appropriate  Affect: Broad, responsive, consistent with reported mood; good eye contact  Mood: Euthymic  Insight and Judgment: Fair  Approach to testing: Efficient, deliberate; good frustration on cognitively demanding tasks  Rapport: Easily established and maintained      Activities included in this evaluation: CPT Code #Units Time (min)   Psychiatric diagnostic interview 39951 [id number] --   Test evaluation services by professional; first hour. [id number]   Test evaluation services by professional, additional hour (+) [id number] 2    Test administration and scoring by technician, first 30 mins [id number]   Test administration and scoring by technician, additional 30 mins (+) [id number]      G30.9, F02.80     ANANDA BROUSSARD, PhD

## 2025-07-23 NOTE — LETTER
2025       RE: Francisca Ramírez  [address]  [place] [address]     Dear Colleague,    Thank you for referring your patient, Francisca Ramírez, to the [facility] [place] at [facility]. Please see a copy of my visit note below.    The patient was seen for neuropsychological evaluation at the request of Alen Vega MD for the purposes of diagnostic clarification and treatment planning. 222 minutes of test administration and scoring were provided by this writer. Please see Dr. Mayela Marr's report for a full interpretation of the findings.    Digna Franklin  Psychometrist          NEUROPSYCHOLOGICAL EVALUATION  **CONFIDENTIAL**    **This is a medical document intended for communication with the referring provider. It is written in medical language and may contain abbreviations or verbiage that are unfamiliar. It may appear blunt or direct. This report and the results within are not intended to be interpreted in isolation without consultation with your medical provider. **    Name: Francisca Ramírez Education: 12 years    (age): 1950 (74 years) HUERTA:  2025 & 2025   Referral: Alen Vega MD  Department of Family Medicine Handedness:  MRN (Epic): Right  [id number]     IDENTIFYING INFORMATION AND REASON FOR REFERRAL   Ms. Ramírez is a 74-year-old, right-handed, White female with a history of depression.  She completed neuropsychological testing on 5/15/2023 and 6/15/2024 which were suggestive of amnestic mild cognitive impairment. This evaluation was requested to provide a comprehensive re-assessment of her current neuropsychological status to inform treatment planning.     IMPRESSION  See below for relevant background information and detailed test results. See separate abstract for supporting documentation including a list of neuropsychological measures and test scores.    Ms. Ramírez's neuropsychological  profile again revealed marked difficulty with learning and memory characterized by amnestic rapid forgetting of information as well as notable difficulty verbal fluency, spatial perception, and aspects of executive functioning (i.e., mental flexibility, cognitive inhibition, practical reasoning/safety awareness). Declines were noted on tests of letter cue verbal fluency, working memory, and knowledge of health and safety compared to 2024. Performances were within normal limits and stable on tests of immediate auditory attention, basic visuoconstruction, confrontation naming, verbal and visual reasoning, and processing speed. Assessment of current psychological and emotional status revealed the absence of clinically significant depressive mood symptoms or excessive anxiety.    Overall, Ms. Ramírez continues to exhibit multidomain cognitive impairment with some declines compared to last year. These findings, in the context of her son's report that she is now needing assistance with activities of daily living, is suggestive of a diagnosis of major neurocognitive disorder (i.e., dementia). The pattern of memory impairment and progressive decline over time is strongly suggestive of a neurodegenerative process and Alzheimer's disease is the most likely etiology.     RECOMMENDATIONS  Results from the present evaluation revealed significant memory impairment. It is recommended that trusted family continue to provide regular oversight and monitoring for complex activities of daily living to ensure her safety and wellbeing are maintained.    Ms. Ramírez should be accompanied to all medical appointments to ensure the accurate exchange and recollection of information.  Ms. Ramírez is encouraged to continue to live a healthy lifestyle that promotes brain health, including getting appropriate exercise (as advised by her healthcare providers), getting regular sleep, and eating healthy.    We recommend that Ms. Ramírez  continue to maintain a socially active lifestyle. She is likely to benefit from continuing to engage in social activities that she enjoys as these activities may offer cognitive stimulation, help to prevent feelings of depression, and provide emotional benefits that will maximize quality of life.   The following resources may be helpful to Ms. Ramírez and her family:  -Family members needing additional information regarding dementia can contact the Alzheimer's Disease and Related Disorders Association at [phone], or visit the website online at http://www.alz.org. This organization provides educational and referral resources for various types of dementia and provides information that may be beneficial now or in the future.    -The [facility] Chapter of the Alzheimer's Association can provide further information about family support groups in the area, home health services, and respite services. Information is also available at their website: www.alz.org/mnnd  -The Family Caregiver [place] website also has helpful information:  http://www.caregiver.org/caregiver/jsp/home.jsp.   -There are several resource guides that have been published for family members and caregivers of individuals with dementia.  -Creating Moments of [place] by Negrita Alexander provides a number of simple, helpful hints for caregivers.  -[facility] on Alzheimer's Disease provides practical information for families.  -A Dignified Life: The Best Friends Approach to Alzheimer's Care by ANAM Elkins M.S.W. and LEWIS Palmer, M.P.H.  -The 36-Hour Day: A Family Guide to Caring for People Who Have Alzheimer's Disease and Other Dementias (7th Edition) by Yolanda Mcbride M.A. and Keaton Leigh M.D.  No neuropsychological follow-up is currently indicated.    Thank you for the opportunity to participate in Ms. Ramírez's care.  These findings and recommendations were reviewed with the patient and her son, Wilmar, in a separate feedback session on  7/29/2025 and all their questions were answered. If you have any questions regarding this evaluation, please do not hesitate to contact me.       Mayela Marr, Ph.D., , ABPP  Board Certified Clinical Neuropsychologist    RELEVANT BACKGROUND INFORMATION AND SUPPORTING DOCUMENTATION  Gathered from a clinical interview with the patient and her son, Wilmar, and reviews of electronic medical record. Clinical interview was completed via telehealth on 7/22/2025.     History of Presenting Problem(s)  Ms. Ramírez presented with a history of depression. She completed neuropsychological testing on 5/15/2023 and 6/15/2024 and in the context of reported intact activities of daily living, a diagnosis of amnestic mild cognitive impairment was provided at both of those evaluations. At the time of this assessment Ms. Ramírez reported memory changes over the past year characterized by more trouble recalling details of conversations and appointments. She noted some days are better than others. Her son, Wilmar, noted progression of cognitive decline in the past year. He stated she continues to repeat questions but she needs more assistance with activities of daily living at this time. She continues to live with her son, Dell, and her son, Wilmar, is around in the summer. Her son continues to assist with finances, but now her sons are more involved with helping her keep track of upcoming medical appointments and events. Her son sets up her pillbox and they need to remind her to take her medications at the correct time. She does minimal meal preparation, making only simple meals, though Wilmar stated he would not feel comfortable with her using the strove or oven without observation. She stopped driving last month following an instance of getting lost. Specifically, she left to go to [employer] in [place] 4 miles away from her home and she did not return for several hours prompting her family to contact the police to look for her. 11  hours later she managed to call her cell phone, that she had left at home, and her son was able to find her in [place], but she was unable to discuss what happened in those hours she was gone. She is independent with her basic self-care and pet care though her son stated that he is uncomfortable with her being alone during the day.     Physical symptoms  Symptom Present Absent Notes   Balance difficulties [] [x]    Falls [] [x]    Gait changes [] [x] Son stated she walks slower   Tremor [] [x]    Numbness/Tingling [] [x]    Weakness [] [x]    Vision changes [] [x]    Hearing loss [] [x]    Hyposmia  [] [x]    Incontinence [] [x]    Other [x] [] GI distress     Psychiatric History  Ms. Ramírez reported current mood is  I guess fine. I'm not happy, I'm not sad.   History is notable for depression that has been well managed with medication (venlafaxine) for many years; she denied any recent symptoms of depression.  She otherwise denied history of hypomanic or manic mood symptoms, excessive anxiety or uncontrollable worry, compulsive behaviors, alteration of sensory perceptual processes or disordered thought.  Suicidality/ Self-harm: She denied any suicidal ideation, plan, or intent  Alteration of sensory perceptual processes or disordered thought: Denied  Behavioral/personality changes: Denied     Substance Use History  Alcohol: None  Nicotine: None  Cannabis: None  Problematic Substance Use: Denied    Health Behaviors   Sleep: ~8+ hours of cumulative sleep nightly. She denied sleep disturbance, snoring, gasping arousals, or parasomnic behaviors. She reported feeling rested in the morning though her son stated she seems to sleep longer lately, and he sometimes needs to get her out of bed. She denied daytime fatigue but noted she may nap in the afternoon.   Exercise: She may walk with her dog to get the mail (10-15min walk).   Pain: Denied; reported some GI distress that is worse recently.     Neurodiagnostic  Findings   Prior neuropsychological evaluation (6/15/2024) Ms. Ramírez underwent neuropsychological testing on 5/15/2023 which revealed significant difficulty on tests of verbal and visual memory characterized by amnestic rapid forgetting of information and difficulties on select tests of executive functioning. Her current neuropsychological performance revealed a generally stable cognitive profile with a few improved and declined scores. Specifically, while recall of a story improved to the below average range, her learning of a word list declined from low average to exceptionally low range. Consistent with her prior evaluation, she was unable to recall any words from the list or any figural details from a visual memory task following a delay and recognition scores across all memory tests were below expectation. Performances improved on tests of speeded word reading and knowledge of health and safety; however, scores declined on tests of mental flexibility, semantic verbal fluency, and spatial perception. Overall, scores fell below expectation on tests of learning and memory, semantic verbal fluency, and aspects of executive functioning including mental flexibly and planning/organization. Scores were within expectation on tests of attention/working memory, processing speed, naming, letter-cue verbal fluency, basic visuoconstruction, cognitive inhibition, and abstract reasoning. Assessment of psychological status revealed the absence of clinically significant depression or excessive anxiety symptoms. While findings suggest Ms. Ramírez likely experiences cognitive inefficiencies in her day-to-day life, she and her son again reported limited functional difficulties at present. Importantly, she has several cognitive strengths that will be beneficial to her in order to compensate for any cognitive weaknesses. Taken together, observed test performance and reported levels of day-to-day functioning continue to suggest  a diagnosis of mild neurocognitive disorder (i.e., amnestic mild cognitive impairment). Amnestic MCI is highly associated with future development of Alzheimer-type dementia and ongoing monitoring of her cognitive and functional status over time is recommended.     Medical History (per EMR)  Patient Active Problem List   Diagnosis     Acute reaction to stress     HYPERLIPIDEMIA LDL GOAL <130     Impaired fasting glucose     Knee pain     Family history of colon cancer     Benign neoplasm of skin of trunk, except scrotum     Microscopic colitis, unspecified microscopic colitis type     Arthritis of right knee     Onychomycosis     Cognitive impairment   Denied history of stroke, seizure, or head injury with LOC or post-concussive symptoms    Current Medications (per EMR)  Ms. Ramírez has a current medication list which includes the following prescription(s): cholecalciferol, donepezil, simvastatin, and venlafaxine.      Educational, & Occupational History  Childhood behavioral / emotional / academic problems: Reported math was difficult, but she did not receive any assistance or accommodations  Native Language: English  Education: She described self as an average student. 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     Psychosocial History  Born in [place] and raised in [place], [place]  Marital:   Children: 2 sons  Housing: Lives with her son  Psychosocial support: Strong social support network of family (sons, siblings) and neighbors    Family History  No known history of dementia or neurological disorders    RESULTS  See separate abstract for list of neuropsychological measures and test scores. Descriptive ranges are based on American Academy of Clinical Neuropsychology (2020) consensus guidelines, or test manuals where  appropriate. All standardized scores are adjusted for age and additional adjustments for demographic factors such as education were performed where applicable. Comparisons were made to her neuropsychological evaluation in 6/2024.    PRE-MORBID ABILITY: Premorbid abilities were estimated within the average range based on single word reading ability and demographic factors including sex, ethnicity, education, occupation, and geographic region completed during her prior evaluation.   GENERAL COGNITIVE STATUS: Orientation was within expectation for general personal information, place, and situation. She was not fully oriented to time (2 days off on the date, 1 month off, and she incorrectly estimated the time by ~7 hours). Within the practical domain, performance was below expectation on a measure of conceptual understanding of issues pertaining to health and safety. Her score was consistent with individuals functioning at a low level of independence in the community, which was consistent with prior testing.   ATTENTION/EXECUTIVE FUNCTIONS: Immediate auditory attention and working memory performances were low average and very slightly declined. Verbal abstract reasoning performance was average and stable. Visual reasoning through pattern identification was average and stable. Performance on a test of set-shifting/cognitive flexibility was discontinued due to inability to complete the task consistent with prior testing and notable for several (10) errors. Response inhibition performance was below average and stable. Spontaneous clock drawing was abnormal and notable for numbers in counter-clockwise order and incorrect (stimulus bound) placement of clock hands.   PROCESSING SPEED: Psychomotor processing speed performances were average to high average and stable.  LANGUAGE: Confrontation naming performance was average and stable. Letter-cue verbal fluency performance was below average and declined. Semantic verbal fluency  performance was exceptionally low and stable.  VISUOSPATIAL PROCESSING: Performance on a spatial perception task requiring judgement of angled lines was exceptionally low and stable. Copy of increasingly intricate designs was average and stable. Copy of a clock was within normal limits.  LEARNING AND MEMORY: Initial encoding of a word list over multiple learning trials was exceptionally low and stable. Delayed recall of the list was exceptionally low with no words recalled, consistent with prior testing. Recognition of the word list was exceptionally low with several false positive responses, which is also consistent with prior testing. Initial encoding of contextualized verbal information in the form of short stories was below average and stable. Delayed recall was exceptionally low with no details recalled, consistent with prior testing. Recognition of story details was below average and stable. Encoding of visual information was below average and slightly declined. Delayed recall was exceptionally low with 1 figural detail recalled and stable compared with prior testing. Recognition among distractors was below average with only 1/7 correct responses.     PSYCHOLOGICAL AND BEHAVIORAL: She endorsed minimal symptoms of depression and anxiety on self-report questionnaires.   PERFORMANCE VALIDITY: Performance on neuropsychological tests is dependent upon a number of factors, including sufficient engagement and motivation, to reliably establish an examinee's level of cognitive functioning.  Based upon observations made throughout the evaluation, the patient did not appear to deliberately perform in a suboptimal manner and demonstrated good frustration tolerance on cognitively challenging tasks. Score on an embedded index of performance validity was slightly below established cutoffs but this is not uncommon in individuals with significant cognitive impairment. Overall, test results are believed to accurately represent  the patient's current neurocognitive status.    BEHAVIORAL OBSERVATIONS  Presented on time and was accompanied by her son  Alert, attentive and focused while undergoing testing  Appearance: Well-groomed, casually dressed  Gait/Posture: No abnormalities noted  Sensorimotor: No abnormalities noted  Behavior: Cooperative, pleasant, no behavioral abnormalities noted  Speech: Self-corrected semantic paraphasic errors were noted on a confrontation naming test (e.g., harmonica for harp, raccoon for [place]). Her speech was fluent and articulate with normal rate, prosody, and volume; no conversational word finding difficulty   Thought process: Perseverative/repetitive at times  Thought content: Logical, appropriate  Affect: Broad, responsive, consistent with reported mood; good eye contact  Mood: Euthymic  Insight and Judgment: Fair  Approach to testing: Efficient, deliberate; good frustration on cognitively demanding tasks  Rapport: Easily established and maintained      Activities included in this evaluation: CPT Code #Units Time (min)   Psychiatric diagnostic interview [id number] --   Test evaluation services by professional; first hour. [id number]   Test evaluation services by professional, additional hour (+) [id number]    Test administration and scoring by technician, first 30 mins [id number]   Test administration and scoring by technician, additional 30 mins (+) [id number]      G30.9, F02.80     Again, thank you for allowing me to participate in the care of your patient.      Sincerely,    ANANDA BROUSSARD, PhD

## 2025-07-24 NOTE — PROGRESS NOTES
The patient was seen for neuropsychological evaluation at the request of Alen Vega MD for the purposes of diagnostic clarification and treatment planning. 222 minutes of test administration and scoring were provided by this writer. Please see Dr. Mayela Marr's report for a full interpretation of the findings.    Digna Flores  Psychometrist

## 2025-07-28 ENCOUNTER — PATIENT OUTREACH (OUTPATIENT)
Dept: CARE COORDINATION | Facility: CLINIC | Age: 75
End: 2025-07-28
Payer: COMMERCIAL

## 2025-07-29 ENCOUNTER — ANCILLARY PROCEDURE (OUTPATIENT)
Dept: MAMMOGRAPHY | Facility: CLINIC | Age: 75
End: 2025-07-29
Attending: FAMILY MEDICINE
Payer: COMMERCIAL

## 2025-07-29 DIAGNOSIS — Z12.31 VISIT FOR SCREENING MAMMOGRAM: ICD-10-CM

## 2025-07-29 PROCEDURE — 77063 BREAST TOMOSYNTHESIS BI: CPT | Mod: TC | Performed by: RADIOLOGY

## 2025-07-29 PROCEDURE — 77067 SCR MAMMO BI INCL CAD: CPT | Mod: TC | Performed by: RADIOLOGY

## 2025-07-29 NOTE — PROGRESS NOTES
NEUROPSYCHOLOGICAL EVALUATION  **CONFIDENTIAL**    **This is a medical document intended for communication with the referring provider. It is written in medical language and may contain abbreviations or verbiage that are unfamiliar. It may appear blunt or direct. This report and the results within are not intended to be interpreted in isolation without consultation with your medical provider. **    Name: Francisca Ramírez Education: 12 years    (age): 1950 (74 years) HUERTA:  2025 & 2025   Referral: Alen Vega MD  Department of Family Medicine Handedness:  MRN (Epic): Right  [id number]     IDENTIFYING INFORMATION AND REASON FOR REFERRAL   Ms. Ramírez is a 74-year-old, right-handed, White female with a history of depression.  She completed neuropsychological testing on 5/15/2023 and 6/15/2024 which were suggestive of amnestic mild cognitive impairment. This evaluation was requested to provide a comprehensive re-assessment of her current neuropsychological status to inform treatment planning.     IMPRESSION  See below for relevant background information and detailed test results. See separate abstract for supporting documentation including a list of neuropsychological measures and test scores.    Ms. Ramírez's neuropsychological profile again revealed marked difficulty with learning and memory characterized by amnestic rapid forgetting of information as well as notable difficulty verbal fluency, spatial perception, and aspects of executive functioning (i.e., mental flexibility, cognitive inhibition, practical reasoning/safety awareness). Declines were noted on tests of letter cue verbal fluency, working memory, and knowledge of health and safety compared to . Performances were within normal limits and stable on tests of immediate auditory attention, basic visuoconstruction, confrontation naming, verbal and visual reasoning, and processing speed. Assessment of current psychological and  emotional status revealed the absence of clinically significant depressive mood symptoms or excessive anxiety.    Overall, Ms. Ramírez continues to exhibit multidomain cognitive impairment with some declines compared to last year. These findings, in the context of her son's report that she is now needing assistance with activities of daily living, is suggestive of a diagnosis of major neurocognitive disorder (i.e., dementia). The pattern of memory impairment and progressive decline over time is strongly suggestive of a neurodegenerative process and Alzheimer's disease is the most likely etiology.     RECOMMENDATIONS  Results from the present evaluation revealed significant memory impairment. It is recommended that trusted family continue to provide regular oversight and monitoring for complex activities of daily living to ensure her safety and wellbeing are maintained.    Ms. Ramírez should be accompanied to all medical appointments to ensure the accurate exchange and recollection of information.  Ms. Ramírez is encouraged to continue to live a healthy lifestyle that promotes brain health, including getting appropriate exercise (as advised by her healthcare providers), getting regular sleep, and eating healthy.    We recommend that Ms. Ramírez continue to maintain a socially active lifestyle. She is likely to benefit from continuing to engage in social activities that she enjoys as these activities may offer cognitive stimulation, help to prevent feelings of depression, and provide emotional benefits that will maximize quality of life.   The following resources may be helpful to Ms. Ramírez and her family:  -Family members needing additional information regarding dementia can contact the Alzheimer's Disease and Related Disorders Association at [phone], or visit the website online at http://www.alz.org. This organization provides educational and referral resources for various types of dementia and  provides information that may be beneficial now or in the future.    -The [facility] Chapter of the Alzheimer's Association can provide further information about family support groups in the area, home health services, and respite services. Information is also available at their website: www.alz.org/[place]nd  -The [facility] [place] website also has helpful information:  http://www.caregiver.org/caregiver/jsp/home.jsp.   -There are several resource guides that have been published for family members and caregivers of individuals with dementia.  -Creating Moments of Yeimi by Negrita Alexander provides a number of simple, helpful hints for caregivers.  -[facility] on Alzheimer's Disease provides practical information for families.  -A Dignified Life: The Best Friends Approach to Alzheimer's Care by ASHELY Galo. and LEWIS Palmer, M.P.H.  -The 36-Hour Day: A Family Guide to Caring for People Who Have Alzheimer's Disease and Other Dementias (7th Edition) by Yolanda Mcbride M.A. and Keaton Leigh M.D.  No neuropsychological follow-up is currently indicated.    Thank you for the opportunity to participate in Ms. Ramírez's care.  These findings and recommendations were reviewed with the patient and her son, Wilmar, in a separate feedback session on 7/29/2025 and all their questions were answered. If you have any questions regarding this evaluation, please do not hesitate to contact me.       Mayela Marr, Ph.D., , ABPP  Board Certified Clinical Neuropsychologist    RELEVANT BACKGROUND INFORMATION AND SUPPORTING DOCUMENTATION  Gathered from a clinical interview with the patient and her son, Wilmar, and reviews of electronic medical record. Clinical interview was completed via telehealth on 7/22/2025.     History of Presenting Problem(s)  Ms. Ramírez presented with a history of depression. She completed neuropsychological testing on 5/15/2023 and 6/15/2024 and in the context of reported intact activities of  daily living, a diagnosis of amnestic mild cognitive impairment was provided at both of those evaluations. At the time of this assessment Ms. Ramírez reported memory changes over the past year characterized by more trouble recalling details of conversations and appointments. She noted some days are better than others. Her son, Wilmar, noted progression of cognitive decline in the past year. He stated she continues to repeat questions but she needs more assistance with activities of daily living at this time. She continues to live with her son, Dell, and her son, Wilmar, is around in the summer. Her son continues to assist with finances, but now her sons are more involved with helping her keep track of upcoming medical appointments and events. Her son sets up her pillbox and they need to remind her to take her medications at the correct time. She does minimal meal preparation, making only simple meals, though Wilmar stated he would not feel comfortable with her using the strove or oven without observation. She stopped driving last month following an instance of getting lost. Specifically, [facility] [place] 4 miles away from her home and she did not return for several hours prompting her family to contact the police to look for her. 11 hours later she managed to call her cell phone, that she had left at home, and her son was able to find her in [place], but she was unable to discuss what happened in those hours she was gone. She is independent with her basic self-care and pet care though her son stated that he is uncomfortable with her being alone during the day.     Physical symptoms  Symptom Present Absent Notes   Balance difficulties [] [x]    Falls [] [x]    Gait changes [] [x] Son stated she walks slower   Tremor [] [x]    Numbness/Tingling [] [x]    Weakness [] [x]    Vision changes [] [x]    Hearing loss [] [x]    Hyposmia  [] [x]    Incontinence [] [x]    Other [x] [] GI distress      Psychiatric History  Ms. Ramírez reported current mood is  I guess fine. I'm not happy, I'm not sad.   History is notable for depression that has been well managed with medication (venlafaxine) for many years; she denied any recent symptoms of depression.  She otherwise denied history of hypomanic or manic mood symptoms, excessive anxiety or uncontrollable worry, compulsive behaviors, alteration of sensory perceptual processes or disordered thought.  Suicidality/ Self-harm: She denied any suicidal ideation, plan, or intent  Alteration of sensory perceptual processes or disordered thought: Denied  Behavioral/personality changes: Denied     Substance Use History  Alcohol: None  Nicotine: None  Cannabis: None  Problematic Substance Use: Denied    Health Behaviors   Sleep: ~8+ hours of cumulative sleep nightly. She denied sleep disturbance, snoring, gasping arousals, or parasomnic behaviors. She reported feeling rested in the morning though her son stated she seems to sleep longer lately, and he sometimes needs to get her out of bed. She denied daytime fatigue but noted she may nap in the afternoon.   Exercise: She may walk with her dog to get the mail (10-15min walk).   Pain: Denied; reported some GI distress that is worse recently.     Neurodiagnostic Findings   Prior neuropsychological evaluation (6/15/2024) Ms. Ramírez underwent neuropsychological testing on 5/15/2023 which revealed significant difficulty on tests of verbal and visual memory characterized by amnestic rapid forgetting of information and difficulties on select tests of executive functioning. Her current neuropsychological performance revealed a generally stable cognitive profile with a few improved and declined scores. Specifically, while recall of a story improved to the below average range, her learning of a word list declined from low average to exceptionally low range. Consistent with her prior evaluation, she was unable to recall any  words from the list or any figural details from a visual memory task following a delay and recognition scores across all memory tests were below expectation. Performances improved on tests of speeded word reading and knowledge of health and safety; however, scores declined on tests of mental flexibility, semantic verbal fluency, and spatial perception. Overall, scores fell below expectation on tests of learning and memory, semantic verbal fluency, and aspects of executive functioning including mental flexibly and planning/organization. Scores were within expectation on tests of attention/working memory, processing speed, naming, letter-cue verbal fluency, basic visuoconstruction, cognitive inhibition, and abstract reasoning. Assessment of psychological status revealed the absence of clinically significant depression or excessive anxiety symptoms. While findings suggest Ms. Ramírez likely experiences cognitive inefficiencies in her day-to-day life, she and her son again reported limited functional difficulties at present. Importantly, she has several cognitive strengths that will be beneficial to her in order to compensate for any cognitive weaknesses. Taken together, observed test performance and reported levels of day-to-day functioning continue to suggest a diagnosis of mild neurocognitive disorder (i.e., amnestic mild cognitive impairment). Amnestic MCI is highly associated with future development of Alzheimer-type dementia and ongoing monitoring of her cognitive and functional status over time is recommended.     Medical History (per EMR)  Patient Active Problem List   Diagnosis    Acute reaction to stress    HYPERLIPIDEMIA LDL GOAL <130    Impaired fasting glucose    Knee pain    Family history of colon cancer    Benign neoplasm of skin of trunk, except scrotum    Microscopic colitis, unspecified microscopic colitis type    Arthritis of right knee    Onychomycosis    Cognitive impairment   Denied history of  stroke, seizure, or head injury with LOC or post-concussive symptoms    Current Medications (per EMR)  Ms. Ramírez has a current medication list which includes the following prescription(s): cholecalciferol, donepezil, simvastatin, and venlafaxine.      Educational, & Occupational History  Childhood behavioral / emotional / academic problems: Reported math was difficult, but she did not receive any assistance or accommodations  Native Language: English  Education: She described self as an average student. 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     Psychosocial History  Born in [place] and raised in [place], [place]  Marital:   Children: 2 sons  Housing: Lives with her son  Psychosocial support: Strong social support network of family (sons, siblings) and neighbors    Family History  No known history of dementia or neurological disorders    RESULTS  See separate abstract for list of neuropsychological measures and test scores. Descriptive ranges are based on American Academy of Clinical Neuropsychology (2020) consensus guidelines, or test manuals where appropriate. All standardized scores are adjusted for age and additional adjustments for demographic factors such as education were performed where applicable. Comparisons were made to her neuropsychological evaluation in 6/2024.    PRE-MORBID ABILITY: Premorbid abilities were estimated within the average range based on single word reading ability and demographic factors including sex, ethnicity, education, occupation, and geographic region completed during her prior evaluation.   GENERAL COGNITIVE STATUS: Orientation was within expectation for general personal information, place, and situation. She was not fully oriented to time (2 days off on the date, 1 month off, and  she incorrectly estimated the time by ~7 hours). Within the practical domain, performance was below expectation on a measure of conceptual understanding of issues pertaining to health and safety. Her score was consistent with individuals functioning at a low level of independence in the community, which was consistent with prior testing.   ATTENTION/EXECUTIVE FUNCTIONS: Immediate auditory attention and working memory performances were low average and very slightly declined. Verbal abstract reasoning performance was average and stable. Visual reasoning through pattern identification was average and stable. Performance on a test of set-shifting/cognitive flexibility was discontinued due to inability to complete the task consistent with prior testing and notable for several (10) errors. Response inhibition performance was below average and stable. Spontaneous clock drawing was abnormal and notable for numbers in counter-clockwise order and incorrect (stimulus bound) placement of clock hands.   PROCESSING SPEED: Psychomotor processing speed performances were average to high average and stable.  LANGUAGE: Confrontation naming performance was average and stable. Letter-cue verbal fluency performance was below average and declined. Semantic verbal fluency performance was exceptionally low and stable.  VISUOSPATIAL PROCESSING: Performance on a spatial perception task requiring judgement of angled lines was exceptionally low and stable. Copy of increasingly intricate designs was average and stable. Copy of a clock was within normal limits.  LEARNING AND MEMORY: Initial encoding of a word list over multiple learning trials was exceptionally low and stable. Delayed recall of the list was exceptionally low with no words recalled, consistent with prior testing. Recognition of the word list was exceptionally low with several false positive responses, which is also consistent with prior testing. Initial encoding of contextualized  verbal information in the form of short stories was below average and stable. Delayed recall was exceptionally low with no details recalled, consistent with prior testing. Recognition of story details was below average and stable. Encoding of visual information was below average and slightly declined. Delayed recall was exceptionally low with 1 figural detail recalled and stable compared with prior testing. Recognition among distractors was below average with only 1/7 correct responses.     PSYCHOLOGICAL AND BEHAVIORAL: She endorsed minimal symptoms of depression and anxiety on self-report questionnaires.   PERFORMANCE VALIDITY: Performance on neuropsychological tests is dependent upon a number of factors, including sufficient engagement and motivation, to reliably establish an examinee's level of cognitive functioning.  Based upon observations made throughout the evaluation, the patient did not appear to deliberately perform in a suboptimal manner and demonstrated good frustration tolerance on cognitively challenging tasks. Score on an embedded index of performance validity was slightly below established cutoffs but this is not uncommon in individuals with significant cognitive impairment. Overall, test results are believed to accurately represent the patient's current neurocognitive status.    BEHAVIORAL OBSERVATIONS  Presented on time and was accompanied by her son  Alert, attentive and focused while undergoing testing  Appearance: Well-groomed, casually dressed  Gait/Posture: No abnormalities noted  Sensorimotor: No abnormalities noted  Behavior: Cooperative, pleasant, no behavioral abnormalities noted  Speech: Self-corrected semantic paraphasic errors were noted on a confrontation naming test (e.g., harmonica for harp, raccoon for [place]). Her speech was fluent and articulate with normal rate, prosody, and volume; no conversational word finding difficulty   Thought process: Perseverative/repetitive at  times  Thought content: Logical, appropriate  Affect: Broad, responsive, consistent with reported mood; good eye contact  Mood: Euthymic  Insight and Judgment: Fair  Approach to testing: Efficient, deliberate; good frustration on cognitively demanding tasks  Rapport: Easily established and maintained      Activities included in this evaluation: CPT Code #Units Time (min)   Psychiatric diagnostic interview 14889 1 --   Test evaluation services by professional; first hour. [id number]   Test evaluation services by professional, additional hour (+) [id number] 2    Test administration and scoring by technician, first 30 mins [id number] [phone]   Test administration and scoring by technician, additional 30 mins (+) [id number]      G30.9, F02.80

## 2025-07-29 NOTE — PROGRESS NOTES
"Provider: ROBERTO      Tech: STU      Patient Name: Francisca Ramírez     : [birth year]      MRN: [id number]     HUERTA: 25      Age: 74      Education: 12      Ethnicity: W      Handedness: Right      Station: OP             NEUROPSYCHOLOGICAL TESTS RAW SCORE STANDARDIZED SCORE* PERCENTILE   WAIS-IV Digit Span       RDS 6 --            NAB Orientation        Total Score 24 --            Wechsler Adult Intelligence Scale-IV (WAIS-IV)      Digit Span 18 ScS= 7? 16%   Digit Span Forward 8 ScS= 8 25%   Digit Span Backward 5 ScS= 6 9%   Digit Span Sequencing 5 ScS= 8? 25%   Digit Span-Longest Digit Forward 6      Digit Span-Longest Digit Backward 3      Digit Span-Longest Digit Sequencing 4          Similarities 17 ScS= 8 25%   Matrix Reasoning 7 ScS= 8 25%   Coding 33 ScS= 8 25%          Trail Making Test (Time/Errors)       Part A 27/0 T= 61 86%   Part B D/C @ 300\"/10      Stroop       Word 82 T= 45 31%   Color 42 T= 38 12%   Color/Word 8 T= 29 1%   Interference -20 T= 36 8%   Independent Living Scales (ILS)       Health and Safety 26 T= 26?           Martin Verbal Learning Test - Revised (HVLT-R, Form 3)     Trial 1 1 -     Trial 2 4 -     Trial 3 5 -     Total Trials 1-3 10 T= 25 1%   Delayed Recall 0 T= <=20    % Retention 0% T= <=20    Recognition Hits 8 -     Recognition False Alarms 9 -     Recognition Discriminability -1 T= <=20    Wechsler Memory Scale-IV (WMS-IV, OA)      Logical Memory I 12 ScS= 4 2%   Logical Memory II 0 ScS= 1? <1%   Logical Memory Recognition 12 --  3-9%   Visual Reproduction I 17 ScS= 5 5%   Visual Reproduction II 1 ScS= 3 1%   Visual Reproduction Recognition 1 --  3-9%   Visual Reproduction Copy 42 --  51-75%          COWAT (Form: FAS)       Total 21 T= 33? 4%   Repetition Errors 1      Set Loss Errors 4      Animal Fluency       Total 10 T= 29 1%   Repetition Errors 6      Set Loss Errors 0      [place] Naming Test       Total 47 T= 46 34%   Phonemic Cue (Correct/Administered)         "      Repeatable Battery for the Assessment of Neuropsychological Status (RBANS-A)    Line Orientation 5 --  <=2   Clock Drawing Test       Command Mild Impairment --     Copy   Normal --            Geriatric Depression Scale (GDS)       Total Score 5      Geriatric Anxiety Scale       Somatic 2      Cognitive 1      Affective 1      Total 4      Abstract       WNL = within normal limits. DC = discontinued due to patient s inability to complete the test. (E) = Utilized age and education-corrected norms.   *Standardized scores: T= T-score; mean = 50, standard deviation =10; Z= z-score; mean = 0, standard deviation = 1; ScS = scaled score; mean = 10, standard deviation = 3; MAS = [place] Older Adult Normative Study age adjusted scaled score; mean = 10, standard deviation = 3; SS = standard score; mean = 100, standard deviation = 15.     ??Arrows denote improvement or decline in scores relative to prior testing in 2024.

## 2025-08-07 DIAGNOSIS — E78.5 HYPERLIPIDEMIA LDL GOAL <130: ICD-10-CM

## 2025-08-07 RX ORDER — SIMVASTATIN 40 MG
40 TABLET ORAL DAILY
Qty: 90 TABLET | Refills: 1 | Status: SHIPPED | OUTPATIENT
Start: 2025-08-07

## 2025-08-21 ENCOUNTER — VIRTUAL VISIT (OUTPATIENT)
Dept: FAMILY MEDICINE | Facility: CLINIC | Age: 75
End: 2025-08-21
Payer: COMMERCIAL

## 2025-08-21 DIAGNOSIS — R41.89 COGNITIVE IMPAIRMENT: ICD-10-CM

## 2025-08-21 DIAGNOSIS — F02.80 ALZHEIMER'S DISEASE (H): Primary | ICD-10-CM

## 2025-08-21 DIAGNOSIS — F43.0 ACUTE REACTION TO STRESS: ICD-10-CM

## 2025-08-21 DIAGNOSIS — G30.9 ALZHEIMER'S DISEASE (H): Primary | ICD-10-CM

## 2025-08-21 DIAGNOSIS — E78.5 HYPERLIPIDEMIA LDL GOAL <130: ICD-10-CM

## 2025-08-21 RX ORDER — CHOLESTYRAMINE 4 G/9G
POWDER, FOR SUSPENSION ORAL
COMMUNITY
Start: 2025-08-01

## 2025-08-21 RX ORDER — HYOSCYAMINE SULFATE 0.38 MG/1
1 TABLET, EXTENDED RELEASE ORAL
COMMUNITY
Start: 2025-08-05

## 2025-08-21 RX ORDER — DONEPEZIL HYDROCHLORIDE 10 MG/1
10 TABLET, FILM COATED ORAL AT BEDTIME
Qty: 90 TABLET | Refills: 1 | Status: SHIPPED | OUTPATIENT
Start: 2025-08-21

## 2025-08-21 RX ORDER — SIMVASTATIN 40 MG
40 TABLET ORAL DAILY
Qty: 90 TABLET | Refills: 1 | Status: SHIPPED | OUTPATIENT
Start: 2025-08-21

## 2025-08-21 RX ORDER — VENLAFAXINE HYDROCHLORIDE 37.5 MG/1
37.5 CAPSULE, EXTENDED RELEASE ORAL DAILY
Qty: 90 CAPSULE | Refills: 1 | Status: SHIPPED | OUTPATIENT
Start: 2025-08-21

## 2025-08-21 RX ORDER — MEMANTINE HYDROCHLORIDE 5 MG/1
TABLET ORAL
Qty: 195 TABLET | Refills: 1 | Status: SHIPPED | OUTPATIENT
Start: 2025-08-21 | End: 2025-12-04

## 2025-08-21 ASSESSMENT — ANXIETY QUESTIONNAIRES
4. TROUBLE RELAXING: NOT AT ALL
1. FEELING NERVOUS, ANXIOUS, OR ON EDGE: NOT AT ALL
8. IF YOU CHECKED OFF ANY PROBLEMS, HOW DIFFICULT HAVE THESE MADE IT FOR YOU TO DO YOUR WORK, TAKE CARE OF THINGS AT HOME, OR GET ALONG WITH OTHER PEOPLE?: NOT DIFFICULT AT ALL
GAD7 TOTAL SCORE: 0
3. WORRYING TOO MUCH ABOUT DIFFERENT THINGS: NOT AT ALL
2. NOT BEING ABLE TO STOP OR CONTROL WORRYING: NOT AT ALL
7. FEELING AFRAID AS IF SOMETHING AWFUL MIGHT HAPPEN: NOT AT ALL
7. FEELING AFRAID AS IF SOMETHING AWFUL MIGHT HAPPEN: NOT AT ALL
IF YOU CHECKED OFF ANY PROBLEMS ON THIS QUESTIONNAIRE, HOW DIFFICULT HAVE THESE PROBLEMS MADE IT FOR YOU TO DO YOUR WORK, TAKE CARE OF THINGS AT HOME, OR GET ALONG WITH OTHER PEOPLE: NOT DIFFICULT AT ALL
6. BECOMING EASILY ANNOYED OR IRRITABLE: NOT AT ALL
GAD7 TOTAL SCORE: 0
5. BEING SO RESTLESS THAT IT IS HARD TO SIT STILL: NOT AT ALL
GAD7 TOTAL SCORE: 0

## 2025-08-21 ASSESSMENT — PATIENT HEALTH QUESTIONNAIRE - PHQ9
SUM OF ALL RESPONSES TO PHQ QUESTIONS 1-9: 2
10. IF YOU CHECKED OFF ANY PROBLEMS, HOW DIFFICULT HAVE THESE PROBLEMS MADE IT FOR YOU TO DO YOUR WORK, TAKE CARE OF THINGS AT HOME, OR GET ALONG WITH OTHER PEOPLE: NOT DIFFICULT AT ALL
SUM OF ALL RESPONSES TO PHQ QUESTIONS 1-9: 2